# Patient Record
Sex: MALE | Race: WHITE | Employment: OTHER | URBAN - METROPOLITAN AREA
[De-identification: names, ages, dates, MRNs, and addresses within clinical notes are randomized per-mention and may not be internally consistent; named-entity substitution may affect disease eponyms.]

---

## 2018-05-11 ENCOUNTER — OFFICE VISIT (OUTPATIENT)
Dept: CARDIOLOGY CLINIC | Age: 83
End: 2018-05-11
Payer: MEDICARE

## 2018-05-11 VITALS
WEIGHT: 153.7 LBS | RESPIRATION RATE: 16 BRPM | HEIGHT: 71 IN | SYSTOLIC BLOOD PRESSURE: 120 MMHG | DIASTOLIC BLOOD PRESSURE: 82 MMHG | BODY MASS INDEX: 21.52 KG/M2 | HEART RATE: 73 BPM

## 2018-05-11 DIAGNOSIS — I65.29 CAROTID ATHEROSCLEROSIS, UNSPECIFIED LATERALITY: ICD-10-CM

## 2018-05-11 DIAGNOSIS — I10 ESSENTIAL HYPERTENSION: ICD-10-CM

## 2018-05-11 DIAGNOSIS — I73.00 RAYNAUD'S DISEASE WITHOUT GANGRENE: ICD-10-CM

## 2018-05-11 DIAGNOSIS — I51.9 HEART PROBLEM: Primary | ICD-10-CM

## 2018-05-11 DIAGNOSIS — I44.7 LBBB (LEFT BUNDLE BRANCH BLOCK): ICD-10-CM

## 2018-05-11 DIAGNOSIS — E07.9 THYROID DISEASE: ICD-10-CM

## 2018-05-11 PROCEDURE — 99214 OFFICE O/P EST MOD 30 MIN: CPT | Performed by: INTERNAL MEDICINE

## 2018-05-11 PROCEDURE — 93000 ELECTROCARDIOGRAM COMPLETE: CPT | Performed by: INTERNAL MEDICINE

## 2018-05-14 ENCOUNTER — HOSPITAL ENCOUNTER (OUTPATIENT)
Dept: CARDIOLOGY | Age: 83
Discharge: HOME | End: 2018-05-14
Payer: MEDICARE

## 2018-05-14 ENCOUNTER — TELEPHONE (OUTPATIENT)
Dept: CARDIOLOGY CLINIC | Age: 83
End: 2018-05-14

## 2018-05-14 VITALS
WEIGHT: 153 LBS | HEART RATE: 96 BPM | BODY MASS INDEX: 21.42 KG/M2 | SYSTOLIC BLOOD PRESSURE: 130 MMHG | HEIGHT: 71 IN | DIASTOLIC BLOOD PRESSURE: 70 MMHG

## 2018-05-14 DIAGNOSIS — R94.31 ABNORMAL EKG: Primary | ICD-10-CM

## 2018-05-14 DIAGNOSIS — I73.00 RAYNAUD'S DISEASE WITHOUT GANGRENE: ICD-10-CM

## 2018-05-14 DIAGNOSIS — I44.7 LBBB (LEFT BUNDLE BRANCH BLOCK): ICD-10-CM

## 2018-05-14 DIAGNOSIS — E07.9 THYROID DISEASE: ICD-10-CM

## 2018-05-14 DIAGNOSIS — I65.29 CAROTID ATHEROSCLEROSIS, UNSPECIFIED LATERALITY: ICD-10-CM

## 2018-05-14 DIAGNOSIS — I10 ESSENTIAL HYPERTENSION: ICD-10-CM

## 2018-05-14 DIAGNOSIS — I51.9 HEART PROBLEM: ICD-10-CM

## 2018-05-14 PROCEDURE — 93017 CV STRESS TEST TRACING ONLY: CPT

## 2018-05-14 PROCEDURE — 6360000002 HC RX W HCPCS: Performed by: INTERNAL MEDICINE

## 2018-05-14 PROCEDURE — 3430000000 HC RX DIAGNOSTIC RADIOPHARMACEUTICAL: Performed by: INTERNAL MEDICINE

## 2018-05-14 PROCEDURE — 2580000003 HC RX 258: Performed by: INTERNAL MEDICINE

## 2018-05-14 PROCEDURE — 78452 HT MUSCLE IMAGE SPECT MULT: CPT

## 2018-05-14 PROCEDURE — A9500 TC99M SESTAMIBI: HCPCS | Performed by: INTERNAL MEDICINE

## 2018-05-14 RX ORDER — SODIUM CHLORIDE 0.9 % (FLUSH) 0.9 %
10 SYRINGE (ML) INJECTION PRN
Status: DISCONTINUED | OUTPATIENT
Start: 2018-05-14 | End: 2018-05-15 | Stop reason: HOSPADM

## 2018-05-14 RX ADMIN — Medication 10 ML: at 08:52

## 2018-05-14 RX ADMIN — REGADENOSON 0.4 MG: 0.08 INJECTION, SOLUTION INTRAVENOUS at 08:52

## 2018-05-14 RX ADMIN — Medication 10 ML: at 07:08

## 2018-05-14 RX ADMIN — Medication 10 ML: at 08:53

## 2018-05-14 RX ADMIN — Medication 32 MILLICURIE: at 08:52

## 2018-05-14 RX ADMIN — Medication 10.6 MILLICURIE: at 07:08

## 2019-01-04 ENCOUNTER — PREP FOR PROCEDURE (OUTPATIENT)
Dept: GASTROENTEROLOGY | Age: 84
End: 2019-01-04

## 2019-01-16 ENCOUNTER — ANESTHESIA EVENT (OUTPATIENT)
Dept: ENDOSCOPY | Age: 84
End: 2019-01-16
Payer: MEDICARE

## 2019-01-16 ENCOUNTER — HOSPITAL ENCOUNTER (OUTPATIENT)
Age: 84
Setting detail: OUTPATIENT SURGERY
Discharge: HOME | End: 2019-01-16
Attending: INTERNAL MEDICINE | Admitting: INTERNAL MEDICINE
Payer: MEDICARE

## 2019-01-16 ENCOUNTER — ANESTHESIA (OUTPATIENT)
Dept: ENDOSCOPY | Age: 84
End: 2019-01-16
Payer: MEDICARE

## 2019-01-16 VITALS — OXYGEN SATURATION: 95 % | SYSTOLIC BLOOD PRESSURE: 148 MMHG | DIASTOLIC BLOOD PRESSURE: 114 MMHG

## 2019-01-16 VITALS
DIASTOLIC BLOOD PRESSURE: 59 MMHG | HEIGHT: 71 IN | WEIGHT: 148 LBS | OXYGEN SATURATION: 96 % | BODY MASS INDEX: 20.72 KG/M2 | SYSTOLIC BLOOD PRESSURE: 116 MMHG | RESPIRATION RATE: 18 BRPM | HEART RATE: 66 BPM | TEMPERATURE: 97.5 F

## 2019-01-16 PROCEDURE — 7100000010 HC PHASE II RECOVERY - FIRST 15 MIN: Performed by: INTERNAL MEDICINE

## 2019-01-16 PROCEDURE — 3700000000 HC ANESTHESIA ATTENDED CARE: Performed by: INTERNAL MEDICINE

## 2019-01-16 PROCEDURE — 2580000003 HC RX 258: Performed by: NURSE ANESTHETIST, CERTIFIED REGISTERED

## 2019-01-16 PROCEDURE — 3609012400 HC EGD TRANSORAL BIOPSY SINGLE/MULTIPLE: Performed by: INTERNAL MEDICINE

## 2019-01-16 PROCEDURE — 6360000002 HC RX W HCPCS: Performed by: NURSE ANESTHETIST, CERTIFIED REGISTERED

## 2019-01-16 PROCEDURE — 3700000001 HC ADD 15 MINUTES (ANESTHESIA): Performed by: INTERNAL MEDICINE

## 2019-01-16 PROCEDURE — 88305 TISSUE EXAM BY PATHOLOGIST: CPT

## 2019-01-16 PROCEDURE — 7100000011 HC PHASE II RECOVERY - ADDTL 15 MIN: Performed by: INTERNAL MEDICINE

## 2019-01-16 PROCEDURE — 2709999900 HC NON-CHARGEABLE SUPPLY: Performed by: INTERNAL MEDICINE

## 2019-01-16 PROCEDURE — 88342 IMHCHEM/IMCYTCHM 1ST ANTB: CPT

## 2019-01-16 RX ORDER — SODIUM CHLORIDE 0.9 % (FLUSH) 0.9 %
10 SYRINGE (ML) INJECTION EVERY 12 HOURS SCHEDULED
Status: CANCELLED | OUTPATIENT
Start: 2019-01-16

## 2019-01-16 RX ORDER — SODIUM CHLORIDE 0.9 % (FLUSH) 0.9 %
10 SYRINGE (ML) INJECTION PRN
Status: DISCONTINUED | OUTPATIENT
Start: 2019-01-16 | End: 2019-01-16 | Stop reason: HOSPADM

## 2019-01-16 RX ORDER — SODIUM CHLORIDE 9 MG/ML
INJECTION, SOLUTION INTRAVENOUS CONTINUOUS PRN
Status: DISCONTINUED | OUTPATIENT
Start: 2019-01-16 | End: 2019-01-16 | Stop reason: SDUPTHER

## 2019-01-16 RX ORDER — SODIUM CHLORIDE 0.9 % (FLUSH) 0.9 %
10 SYRINGE (ML) INJECTION EVERY 12 HOURS SCHEDULED
Status: DISCONTINUED | OUTPATIENT
Start: 2019-01-16 | End: 2019-01-16 | Stop reason: HOSPADM

## 2019-01-16 RX ORDER — SODIUM CHLORIDE 9 MG/ML
INJECTION, SOLUTION INTRAVENOUS CONTINUOUS
Status: CANCELLED | OUTPATIENT
Start: 2019-01-16

## 2019-01-16 RX ORDER — PROPOFOL 10 MG/ML
INJECTION, EMULSION INTRAVENOUS PRN
Status: DISCONTINUED | OUTPATIENT
Start: 2019-01-16 | End: 2019-01-16 | Stop reason: SDUPTHER

## 2019-01-16 RX ORDER — SODIUM CHLORIDE 9 MG/ML
INJECTION, SOLUTION INTRAVENOUS CONTINUOUS
Status: DISCONTINUED | OUTPATIENT
Start: 2019-01-16 | End: 2019-01-16 | Stop reason: HOSPADM

## 2019-01-16 RX ORDER — SODIUM CHLORIDE 0.9 % (FLUSH) 0.9 %
10 SYRINGE (ML) INJECTION PRN
Status: CANCELLED | OUTPATIENT
Start: 2019-01-16

## 2019-01-16 RX ADMIN — PROPOFOL 150 MG: 10 INJECTION, EMULSION INTRAVENOUS at 12:41

## 2019-01-16 RX ADMIN — SODIUM CHLORIDE: 9 INJECTION, SOLUTION INTRAVENOUS at 12:14

## 2019-01-16 ASSESSMENT — PAIN - FUNCTIONAL ASSESSMENT: PAIN_FUNCTIONAL_ASSESSMENT: 0-10

## 2019-01-16 ASSESSMENT — PAIN SCALES - GENERAL: PAINLEVEL_OUTOF10: 0

## 2019-05-09 ENCOUNTER — OFFICE VISIT (OUTPATIENT)
Dept: CARDIOLOGY CLINIC | Age: 84
End: 2019-05-09
Payer: MEDICARE

## 2019-05-09 VITALS
BODY MASS INDEX: 21.39 KG/M2 | WEIGHT: 152.8 LBS | DIASTOLIC BLOOD PRESSURE: 60 MMHG | SYSTOLIC BLOOD PRESSURE: 132 MMHG | HEART RATE: 60 BPM | RESPIRATION RATE: 16 BRPM | HEIGHT: 71 IN

## 2019-05-09 DIAGNOSIS — I10 HYPERTENSION, UNSPECIFIED TYPE: Primary | ICD-10-CM

## 2019-05-09 PROCEDURE — 93000 ELECTROCARDIOGRAM COMPLETE: CPT | Performed by: INTERNAL MEDICINE

## 2019-05-09 PROCEDURE — 99214 OFFICE O/P EST MOD 30 MIN: CPT | Performed by: INTERNAL MEDICINE

## 2019-05-09 RX ORDER — TRAZODONE HYDROCHLORIDE 150 MG/1
TABLET ORAL
Refills: 1 | Status: ON HOLD | COMMUNITY
Start: 2019-04-02 | End: 2021-04-07 | Stop reason: ALTCHOICE

## 2019-05-09 NOTE — PROGRESS NOTES
file     Minutes per session: Not on file    Stress: Not on file   Relationships    Social connections:     Talks on phone: Not on file     Gets together: Not on file     Attends Yarsanism service: Not on file     Active member of club or organization: Not on file     Attends meetings of clubs or organizations: Not on file     Relationship status: Not on file    Intimate partner violence:     Fear of current or ex partner: Not on file     Emotionally abused: Not on file     Physically abused: Not on file     Forced sexual activity: Not on file   Other Topics Concern    Not on file   Social History Narrative    Not on file       Family History   Problem Relation Age of Onset    Stroke Mother     Other Father     Heart Disease Brother        Review of Systems:  Heart: as above   Lungs: as above   Eyes: denies changes in vision or discharge. Ears: denies changes in hearing or pain. Nose: denies epistaxis or masses   Throat: denies sore throat or trouble swallowing. Neuro: denies numbness, tingling, tremors. Skin: denies rashes or itching. : denies hematuria, dysuria   GI: denies vomiting, diarrhea   Psych: denies mood changed, anxiety, depression. All other systems negative. Physical Exam   /60   Pulse 60   Resp 16   Ht 5' 11\" (1.803 m)   Wt 152 lb 12.8 oz (69.3 kg)   BMI 21.31 kg/m²   Constitutional: Oriented to person, place, and time. Well-developed and well-nourished. No distress. Head: Normocephalic and atraumatic. Eyes: EOM are normal. Pupils are equal, round, and reactive to light. Neck: Normal range of motion. Neck supple. No hepatojugular reflux and no JVD present. Carotid bruit is not present. No tracheal deviation present. No thyromegaly present. Cardiovascular: Normal rate, regular rhythm, normal heart sounds and intact distal pulses. Exam reveals no gallop and no friction rub. No murmur heard.   Pulmonary/Chest: Effort normal and breath sounds normal. No respiratory distress. No wheezes. No rales. No tenderness. Abdominal: Soft. Bowel sounds are normal. No distension and no mass. No tenderness. No rebound and no guarding. Musculoskeletal: Normal range of motion. No edema and no tenderness. Lymphadenopathy:   No cervical adenopathy. No groin adenopathy. Neurological: Alert and oriented to person, place, and time. Skin: Skin is warm and dry. No rash noted. Not diaphoretic. No erythema. Psychiatric: Normal mood and affect. Behavior is normal.     EKG:  normal sinus rhythm, nonspecific ST and T waves changes, LBBB. Exercise non-nuclear stress Test:  1/21/16, BP peaked at 152/90, 7.0 METS, no EKG changes    ASSESSMENT AND PLAN:  Patient Active Problem List   Diagnosis    Carotid atherosclerosis    Hypertension    Raynaud's disease    Thyroid disease     1. LBBB:     Pharm stress normal 5/14/18. 2. HTN: Observe. 3. PVD/ Carotid Disease: Continue ASA. Stopped statin at his request.     4. Raynaud's: Continue CCB. 5. Hypothyroidism: As per pcp    6. GERD: PPI    Tj Conway D.O.   Cardiologist  Cardiology, [facility]

## 2019-07-03 ENCOUNTER — HOSPITAL ENCOUNTER (OUTPATIENT)
Dept: ULTRASOUND IMAGING | Age: 84
Discharge: HOME | End: 2019-07-05
Payer: MEDICARE

## 2019-07-03 DIAGNOSIS — I65.21 OCCLUSION OF RIGHT CAROTID ARTERY: ICD-10-CM

## 2019-07-03 PROCEDURE — 93880 EXTRACRANIAL BILAT STUDY: CPT

## 2019-09-06 ENCOUNTER — HOSPITAL ENCOUNTER (OUTPATIENT)
Age: 84
Discharge: HOME | End: 2019-09-06
Payer: MEDICARE

## 2019-09-06 ENCOUNTER — HOSPITAL ENCOUNTER (OUTPATIENT)
Dept: CT IMAGING | Age: 84
Discharge: HOME | End: 2019-09-08
Payer: MEDICARE

## 2019-09-06 DIAGNOSIS — I65.21 OCCLUSION OF RIGHT CAROTID ARTERY: ICD-10-CM

## 2019-09-06 LAB
BUN BLDV-MCNC: 11 MG/DL (ref 8–23)
CREAT SERPL-MCNC: 0.7 MG/DL (ref 0.7–1.2)
GFR AFRICAN AMERICAN: >60
GFR NON-AFRICAN AMERICAN: >60 ML/MIN/1.73

## 2019-09-06 PROCEDURE — 84520 ASSAY OF UREA NITROGEN: CPT

## 2019-09-06 PROCEDURE — 6360000004 HC RX CONTRAST MEDICATION: Performed by: RADIOLOGY

## 2019-09-06 PROCEDURE — 70498 CT ANGIOGRAPHY NECK: CPT

## 2019-09-06 PROCEDURE — 36415 COLL VENOUS BLD VENIPUNCTURE: CPT

## 2019-09-06 PROCEDURE — 82565 ASSAY OF CREATININE: CPT

## 2019-09-06 PROCEDURE — 2580000003 HC RX 258: Performed by: RADIOLOGY

## 2019-09-06 RX ORDER — SODIUM CHLORIDE 0.9 % (FLUSH) 0.9 %
10 SYRINGE (ML) INJECTION PRN
Status: COMPLETED | OUTPATIENT
Start: 2019-09-06 | End: 2019-09-06

## 2019-09-06 RX ADMIN — IOPAMIDOL 80 ML: 755 INJECTION, SOLUTION INTRAVENOUS at 14:33

## 2019-09-06 RX ADMIN — Medication 10 ML: at 14:33

## 2019-09-12 ENCOUNTER — TELEPHONE (OUTPATIENT)
Dept: VASCULAR SURGERY | Age: 84
End: 2019-09-12

## 2021-01-25 ENCOUNTER — IMMUNIZATION (OUTPATIENT)
Dept: PRIMARY CARE CLINIC | Age: 86
End: 2021-01-25
Payer: MEDICARE

## 2021-01-25 DIAGNOSIS — Z23 NEED FOR VACCINATION: Primary | ICD-10-CM

## 2021-01-25 PROCEDURE — 91300 COVID-19, PFIZER VACCINE 30MCG/0.3ML DOSE: CPT | Performed by: PHYSICIAN ASSISTANT

## 2021-01-25 PROCEDURE — 0001A COVID-19, PFIZER VACCINE 30MCG/0.3ML DOSE: CPT | Performed by: PHYSICIAN ASSISTANT

## 2021-02-15 ENCOUNTER — IMMUNIZATION (OUTPATIENT)
Dept: PRIMARY CARE CLINIC | Age: 86
End: 2021-02-15
Payer: MEDICARE

## 2021-02-15 PROCEDURE — 0002A COVID-19, PFIZER VACCINE 30MCG/0.3ML DOSE: CPT | Performed by: NURSE PRACTITIONER

## 2021-02-15 PROCEDURE — 91300 COVID-19, PFIZER VACCINE 30MCG/0.3ML DOSE: CPT | Performed by: NURSE PRACTITIONER

## 2021-04-07 ENCOUNTER — APPOINTMENT (OUTPATIENT)
Dept: CT IMAGING | Age: 86
DRG: 689 | End: 2021-04-07
Payer: MEDICARE

## 2021-04-07 ENCOUNTER — HOSPITAL ENCOUNTER (INPATIENT)
Age: 86
LOS: 3 days | Discharge: HOME | DRG: 689 | End: 2021-04-10
Attending: EMERGENCY MEDICINE | Admitting: FAMILY MEDICINE
Payer: MEDICARE

## 2021-04-07 ENCOUNTER — APPOINTMENT (OUTPATIENT)
Dept: GENERAL RADIOLOGY | Age: 86
DRG: 689 | End: 2021-04-07
Payer: MEDICARE

## 2021-04-07 DIAGNOSIS — A41.9 SEPSIS WITH ACUTE RENAL FAILURE WITHOUT SEPTIC SHOCK, DUE TO UNSPECIFIED ORGANISM, UNSPECIFIED ACUTE RENAL FAILURE TYPE (HCC): Primary | ICD-10-CM

## 2021-04-07 DIAGNOSIS — J18.9 PNEUMONIA OF BOTH LUNGS DUE TO INFECTIOUS ORGANISM, UNSPECIFIED PART OF LUNG: ICD-10-CM

## 2021-04-07 DIAGNOSIS — N17.9 SEPSIS WITH ACUTE RENAL FAILURE WITHOUT SEPTIC SHOCK, DUE TO UNSPECIFIED ORGANISM, UNSPECIFIED ACUTE RENAL FAILURE TYPE (HCC): Primary | ICD-10-CM

## 2021-04-07 DIAGNOSIS — R65.20 SEPSIS WITH ACUTE RENAL FAILURE WITHOUT SEPTIC SHOCK, DUE TO UNSPECIFIED ORGANISM, UNSPECIFIED ACUTE RENAL FAILURE TYPE (HCC): Primary | ICD-10-CM

## 2021-04-07 DIAGNOSIS — N30.01 ACUTE CYSTITIS WITH HEMATURIA: ICD-10-CM

## 2021-04-07 DIAGNOSIS — I95.9 HYPOTENSION, UNSPECIFIED HYPOTENSION TYPE: ICD-10-CM

## 2021-04-07 PROBLEM — N39.0 UTI (URINARY TRACT INFECTION): Status: ACTIVE | Noted: 2021-04-07

## 2021-04-07 LAB
ALBUMIN SERPL-MCNC: 3.6 G/DL (ref 3.5–5.2)
ALP BLD-CCNC: 84 U/L (ref 40–129)
ALT SERPL-CCNC: 10 U/L (ref 0–40)
ANION GAP SERPL CALCULATED.3IONS-SCNC: 10 MMOL/L (ref 7–16)
AST SERPL-CCNC: 17 U/L (ref 0–39)
BACTERIA: ABNORMAL /HPF
BASOPHILS ABSOLUTE: 0 E9/L (ref 0–0.2)
BASOPHILS RELATIVE PERCENT: 0 % (ref 0–2)
BILIRUB SERPL-MCNC: 0.9 MG/DL (ref 0–1.2)
BILIRUBIN URINE: NEGATIVE
BLOOD, URINE: ABNORMAL
BUN BLDV-MCNC: 22 MG/DL (ref 8–23)
CALCIUM SERPL-MCNC: 9.3 MG/DL (ref 8.6–10.2)
CHLORIDE BLD-SCNC: 97 MMOL/L (ref 98–107)
CLARITY: ABNORMAL
CO2: 24 MMOL/L (ref 22–29)
COLOR: YELLOW
CREAT SERPL-MCNC: 1.1 MG/DL (ref 0.7–1.2)
EKG ATRIAL RATE: 80 BPM
EKG P AXIS: 68 DEGREES
EKG P-R INTERVAL: 180 MS
EKG Q-T INTERVAL: 420 MS
EKG QRS DURATION: 140 MS
EKG QTC CALCULATION (BAZETT): 484 MS
EKG R AXIS: 36 DEGREES
EKG T AXIS: 162 DEGREES
EKG VENTRICULAR RATE: 80 BPM
EOSINOPHILS ABSOLUTE: 0 E9/L (ref 0.05–0.5)
EOSINOPHILS RELATIVE PERCENT: 0 % (ref 0–6)
GFR AFRICAN AMERICAN: >60
GFR NON-AFRICAN AMERICAN: >60 ML/MIN/1.73
GLUCOSE BLD-MCNC: 109 MG/DL (ref 74–99)
GLUCOSE URINE: NEGATIVE MG/DL
HCT VFR BLD CALC: 36.9 % (ref 37–54)
HEMOGLOBIN: 12.5 G/DL (ref 12.5–16.5)
KETONES, URINE: ABNORMAL MG/DL
LACTIC ACID: 1.4 MMOL/L (ref 0.5–2.2)
LEUKOCYTE ESTERASE, URINE: ABNORMAL
LYMPHOCYTES ABSOLUTE: 0.65 E9/L (ref 1.5–4)
LYMPHOCYTES RELATIVE PERCENT: 6.1 % (ref 20–42)
MCH RBC QN AUTO: 31.3 PG (ref 26–35)
MCHC RBC AUTO-ENTMCNC: 33.9 % (ref 32–34.5)
MCV RBC AUTO: 92.5 FL (ref 80–99.9)
MONOCYTES ABSOLUTE: 0.32 E9/L (ref 0.1–0.95)
MONOCYTES RELATIVE PERCENT: 2.6 % (ref 2–12)
NEUTROPHILS ABSOLUTE: 9.83 E9/L (ref 1.8–7.3)
NEUTROPHILS RELATIVE PERCENT: 91.3 % (ref 43–80)
NITRITE, URINE: NEGATIVE
NUCLEATED RED BLOOD CELLS: 0 /100 WBC
PDW BLD-RTO: 13.3 FL (ref 11.5–15)
PH UA: 6 (ref 5–9)
PLATELET # BLD: 213 E9/L (ref 130–450)
PMV BLD AUTO: 9.2 FL (ref 7–12)
POTASSIUM REFLEX MAGNESIUM: 3.6 MMOL/L (ref 3.5–5)
PRO-BNP: 4008 PG/ML (ref 0–450)
PROTEIN UA: 30 MG/DL
RBC # BLD: 3.99 E12/L (ref 3.8–5.8)
RBC UA: ABNORMAL /HPF (ref 0–2)
SARS-COV-2, NAAT: NOT DETECTED
SODIUM BLD-SCNC: 131 MMOL/L (ref 132–146)
SPECIFIC GRAVITY UA: 1.01 (ref 1–1.03)
TOTAL CK: 52 U/L (ref 20–200)
TOTAL PROTEIN: 6.6 G/DL (ref 6.4–8.3)
TROPONIN: <0.01 NG/ML (ref 0–0.03)
UROBILINOGEN, URINE: 0.2 E.U./DL
WBC # BLD: 10.8 E9/L (ref 4.5–11.5)
WBC UA: >20 /HPF (ref 0–5)

## 2021-04-07 PROCEDURE — 96361 HYDRATE IV INFUSION ADD-ON: CPT

## 2021-04-07 PROCEDURE — 2580000003 HC RX 258: Performed by: STUDENT IN AN ORGANIZED HEALTH CARE EDUCATION/TRAINING PROGRAM

## 2021-04-07 PROCEDURE — 84484 ASSAY OF TROPONIN QUANT: CPT

## 2021-04-07 PROCEDURE — 2500000003 HC RX 250 WO HCPCS: Performed by: STUDENT IN AN ORGANIZED HEALTH CARE EDUCATION/TRAINING PROGRAM

## 2021-04-07 PROCEDURE — 83605 ASSAY OF LACTIC ACID: CPT

## 2021-04-07 PROCEDURE — 2580000003 HC RX 258: Performed by: FAMILY MEDICINE

## 2021-04-07 PROCEDURE — 87040 BLOOD CULTURE FOR BACTERIA: CPT

## 2021-04-07 PROCEDURE — 81001 URINALYSIS AUTO W/SCOPE: CPT

## 2021-04-07 PROCEDURE — 80053 COMPREHEN METABOLIC PANEL: CPT

## 2021-04-07 PROCEDURE — 93005 ELECTROCARDIOGRAM TRACING: CPT | Performed by: EMERGENCY MEDICINE

## 2021-04-07 PROCEDURE — 99284 EMERGENCY DEPT VISIT MOD MDM: CPT

## 2021-04-07 PROCEDURE — 87077 CULTURE AEROBIC IDENTIFY: CPT

## 2021-04-07 PROCEDURE — 87635 SARS-COV-2 COVID-19 AMP PRB: CPT

## 2021-04-07 PROCEDURE — 87088 URINE BACTERIA CULTURE: CPT

## 2021-04-07 PROCEDURE — 87186 SC STD MICRODIL/AGAR DIL: CPT

## 2021-04-07 PROCEDURE — 93010 ELECTROCARDIOGRAM REPORT: CPT | Performed by: INTERNAL MEDICINE

## 2021-04-07 PROCEDURE — 36415 COLL VENOUS BLD VENIPUNCTURE: CPT

## 2021-04-07 PROCEDURE — 82550 ASSAY OF CK (CPK): CPT

## 2021-04-07 PROCEDURE — 6360000002 HC RX W HCPCS: Performed by: STUDENT IN AN ORGANIZED HEALTH CARE EDUCATION/TRAINING PROGRAM

## 2021-04-07 PROCEDURE — 83880 ASSAY OF NATRIURETIC PEPTIDE: CPT

## 2021-04-07 PROCEDURE — 71045 X-RAY EXAM CHEST 1 VIEW: CPT

## 2021-04-07 PROCEDURE — 85025 COMPLETE CBC W/AUTO DIFF WBC: CPT

## 2021-04-07 PROCEDURE — 70450 CT HEAD/BRAIN W/O DYE: CPT

## 2021-04-07 PROCEDURE — 96365 THER/PROPH/DIAG IV INF INIT: CPT

## 2021-04-07 PROCEDURE — 1200000000 HC SEMI PRIVATE

## 2021-04-07 RX ORDER — ACETAMINOPHEN 650 MG/1
650 SUPPOSITORY RECTAL EVERY 6 HOURS PRN
Status: DISCONTINUED | OUTPATIENT
Start: 2021-04-07 | End: 2021-04-10 | Stop reason: HOSPADM

## 2021-04-07 RX ORDER — LANOLIN ALCOHOL/MO/W.PET/CERES
3 CREAM (GRAM) TOPICAL DAILY
COMMUNITY

## 2021-04-07 RX ORDER — SODIUM CHLORIDE 0.9 % (FLUSH) 0.9 %
5-40 SYRINGE (ML) INJECTION EVERY 12 HOURS SCHEDULED
Status: DISCONTINUED | OUTPATIENT
Start: 2021-04-08 | End: 2021-04-10 | Stop reason: HOSPADM

## 2021-04-07 RX ORDER — FLUTICASONE PROPIONATE 50 MCG
1 SPRAY, SUSPENSION (ML) NASAL DAILY
COMMUNITY
End: 2021-10-31

## 2021-04-07 RX ORDER — POLYETHYLENE GLYCOL 3350 17 G/17G
17 POWDER, FOR SOLUTION ORAL DAILY PRN
Status: DISCONTINUED | OUTPATIENT
Start: 2021-04-07 | End: 2021-04-10 | Stop reason: HOSPADM

## 2021-04-07 RX ORDER — 0.9 % SODIUM CHLORIDE 0.9 %
1000 INTRAVENOUS SOLUTION INTRAVENOUS ONCE
Status: COMPLETED | OUTPATIENT
Start: 2021-04-07 | End: 2021-04-07

## 2021-04-07 RX ORDER — ONDANSETRON 2 MG/ML
4 INJECTION INTRAMUSCULAR; INTRAVENOUS EVERY 6 HOURS PRN
Status: DISCONTINUED | OUTPATIENT
Start: 2021-04-07 | End: 2021-04-10 | Stop reason: HOSPADM

## 2021-04-07 RX ORDER — PROMETHAZINE HYDROCHLORIDE 25 MG/1
12.5 TABLET ORAL EVERY 6 HOURS PRN
Status: DISCONTINUED | OUTPATIENT
Start: 2021-04-07 | End: 2021-04-10 | Stop reason: HOSPADM

## 2021-04-07 RX ORDER — PRAMIPEXOLE DIHYDROCHLORIDE 0.5 MG/1
0.5 TABLET ORAL
COMMUNITY

## 2021-04-07 RX ORDER — PANTOPRAZOLE SODIUM 20 MG/1
20 TABLET, DELAYED RELEASE ORAL EVERY OTHER DAY
Status: DISCONTINUED | OUTPATIENT
Start: 2021-04-08 | End: 2021-04-10 | Stop reason: HOSPADM

## 2021-04-07 RX ORDER — SODIUM CHLORIDE 0.9 % (FLUSH) 0.9 %
5-40 SYRINGE (ML) INJECTION EVERY 12 HOURS SCHEDULED
Status: DISCONTINUED | OUTPATIENT
Start: 2021-04-07 | End: 2021-04-08 | Stop reason: SDUPTHER

## 2021-04-07 RX ORDER — SODIUM CHLORIDE 9 MG/ML
25 INJECTION, SOLUTION INTRAVENOUS PRN
Status: DISCONTINUED | OUTPATIENT
Start: 2021-04-07 | End: 2021-04-10 | Stop reason: HOSPADM

## 2021-04-07 RX ORDER — SODIUM CHLORIDE 0.9 % (FLUSH) 0.9 %
5-40 SYRINGE (ML) INJECTION PRN
Status: DISCONTINUED | OUTPATIENT
Start: 2021-04-07 | End: 2021-04-08 | Stop reason: SDUPTHER

## 2021-04-07 RX ORDER — ACETAMINOPHEN 325 MG/1
650 TABLET ORAL EVERY 6 HOURS PRN
Status: DISCONTINUED | OUTPATIENT
Start: 2021-04-07 | End: 2021-04-10 | Stop reason: HOSPADM

## 2021-04-07 RX ORDER — DOXYCYCLINE HYCLATE 100 MG/1
100 CAPSULE ORAL EVERY 12 HOURS SCHEDULED
Status: DISCONTINUED | OUTPATIENT
Start: 2021-04-08 | End: 2021-04-10 | Stop reason: HOSPADM

## 2021-04-07 RX ORDER — TRAZODONE HYDROCHLORIDE 150 MG/1
150 TABLET ORAL NIGHTLY
Status: DISCONTINUED | OUTPATIENT
Start: 2021-04-08 | End: 2021-04-10 | Stop reason: HOSPADM

## 2021-04-07 RX ORDER — SODIUM CHLORIDE 9 MG/ML
25 INJECTION, SOLUTION INTRAVENOUS PRN
Status: DISCONTINUED | OUTPATIENT
Start: 2021-04-07 | End: 2021-04-08 | Stop reason: SDUPTHER

## 2021-04-07 RX ORDER — CETIRIZINE HYDROCHLORIDE 5 MG/1
5 TABLET ORAL DAILY
COMMUNITY

## 2021-04-07 RX ORDER — LEVOTHYROXINE SODIUM 0.1 MG/1
100 TABLET ORAL DAILY
Status: DISCONTINUED | OUTPATIENT
Start: 2021-04-08 | End: 2021-04-10 | Stop reason: HOSPADM

## 2021-04-07 RX ORDER — SODIUM CHLORIDE 0.9 % (FLUSH) 0.9 %
5-40 SYRINGE (ML) INJECTION PRN
Status: DISCONTINUED | OUTPATIENT
Start: 2021-04-07 | End: 2021-04-10 | Stop reason: HOSPADM

## 2021-04-07 RX ORDER — ASPIRIN 81 MG/1
81 TABLET ORAL DAILY
COMMUNITY

## 2021-04-07 RX ORDER — SODIUM CHLORIDE 9 MG/ML
INJECTION, SOLUTION INTRAVENOUS CONTINUOUS
Status: DISCONTINUED | OUTPATIENT
Start: 2021-04-08 | End: 2021-04-09

## 2021-04-07 RX ADMIN — DOXYCYCLINE 100 MG: 100 INJECTION, POWDER, LYOPHILIZED, FOR SOLUTION INTRAVENOUS at 20:27

## 2021-04-07 RX ADMIN — CEFTRIAXONE 1000 MG: 1 INJECTION, POWDER, FOR SOLUTION INTRAMUSCULAR; INTRAVENOUS at 19:38

## 2021-04-07 RX ADMIN — SODIUM CHLORIDE 1000 ML: 9 INJECTION, SOLUTION INTRAVENOUS at 15:30

## 2021-04-07 RX ADMIN — SODIUM CHLORIDE: 9 INJECTION, SOLUTION INTRAVENOUS at 23:55

## 2021-04-07 RX ADMIN — SODIUM CHLORIDE 1000 ML: 9 INJECTION, SOLUTION INTRAVENOUS at 17:18

## 2021-04-07 ASSESSMENT — PAIN SCALES - GENERAL: PAINLEVEL_OUTOF10: 0

## 2021-04-07 NOTE — ED NOTES
Bed: 13  Expected date:   Expected time:   Means of arrival:   Comments:  EMS     Allegra Walls RN  04/07/21 4209

## 2021-04-07 NOTE — ED PROVIDER NOTES
Patient is an 42-year-old male with past medical history of hypertension, Raynaud's, hard of hearing who presents to the emergency department with a complaint of unsteady gait and \"not acting himself\". Patient is accompanied by daughter. Daughter and patient reporting that last night patient ate dinner and had multiple bouts of emesis. Patient also states that he \"worked out\" more yesterday than normal saying that throughout the day he worked out for 4 hours doing cardio and weightlifting. Patient states he normally does 2 hours a day. Patient states he believes he \"overdid it\". Patient slept well through the night but woke up with a shaking episode to the upper extremities that was described as tremors that lasted approximately 30 minutes and then resolved. Daughter states that through the day patient has just been appearing pale and not acting himself, when he walks he is unsteady gait and has to lean up against a wall to balance himself patient without any falls, loss of conscious, did not strike his head. Patient is not on anticoagulation. She has suffered no other trauma. She denies any chest pain, shortness of breath, headache, nausea, vomiting today, diarrhea. Patient denies any blurred or double vision. Patient denies any dizziness. Patient denies any fever or chills. Patient does endorse body aches and malaise. Patient denies any recent illness or sick contacts. Review of Systems   Constitutional: Negative for appetite change, chills, fatigue and fever. HENT: Negative for congestion, rhinorrhea, trouble swallowing and voice change. Eyes: Negative for photophobia and visual disturbance. Respiratory: Negative for cough and shortness of breath. Cardiovascular: Negative for chest pain, palpitations and leg swelling. Gastrointestinal: Positive for nausea and vomiting. Negative for abdominal pain, constipation and diarrhea. Endocrine: Negative for polyuria.    Genitourinary: Negative for dysuria, flank pain, frequency, hematuria and urgency. Musculoskeletal: Positive for gait problem. Negative for arthralgias and myalgias. Skin: Positive for pallor. Negative for rash and wound. Allergic/Immunologic: Negative for immunocompromised state. Neurological: Positive for weakness. Negative for dizziness, syncope, light-headedness and headaches. Psychiatric/Behavioral: Negative for confusion. The patient is not nervous/anxious. Physical Exam  Vitals signs and nursing note reviewed. Constitutional:       General: He is not in acute distress. Appearance: He is ill-appearing. He is not toxic-appearing. HENT:      Head: Normocephalic and atraumatic. Nose: Nose normal.      Mouth/Throat:      Mouth: Mucous membranes are moist.      Pharynx: Oropharynx is clear. Eyes:      Extraocular Movements: Extraocular movements intact. Conjunctiva/sclera: Conjunctivae normal.      Pupils: Pupils are equal, round, and reactive to light. Neck:      Musculoskeletal: Normal range of motion and neck supple. Cardiovascular:      Rate and Rhythm: Normal rate and regular rhythm. Pulses: Normal pulses. Heart sounds: Normal heart sounds. Pulmonary:      Effort: Pulmonary effort is normal.      Breath sounds: Normal breath sounds. Abdominal:      General: Abdomen is flat. Bowel sounds are normal. There is no distension. Palpations: Abdomen is soft. Tenderness: There is no abdominal tenderness. There is no guarding or rebound. Musculoskeletal: Normal range of motion. Skin:     General: Skin is warm and dry. Capillary Refill: Capillary refill takes less than 2 seconds. Neurological:      General: No focal deficit present. Mental Status: He is alert and oriented to person, place, and time.         MDM  Number of Diagnoses or Management Options  Acute cystitis with hematuria  Hypotension, unspecified hypotension type  Pneumonia of both lungs due to infectious organism, unspecified part of lung  Sepsis with acute renal failure without septic shock, due to unspecified organism, unspecified acute renal failure type [facility])  Diagnosis management comments: Patient is an 49-year-old male presents to the emergency department from home with daughter with complaint of unsteady gait, shaking episode, emesis. Daughter states that the unsteady gait has been going on all day, does not endorse any confusion, no recent trauma or striking his head. Patient states that he had emesis last night as well as shaking episode this morning that both resolved. Patient presents awake, alert, neurologically intact, ill-appearing. Patient is noted hypotensive, afebrile. Patient treated with IV fluids and work-up significant for no leukocytosis and troponin negative, lactic acid and CK negative. proBNP nominal.  Urinalysis consistent with urinary tract infection, urine culture is pending. COVID-19 negative. CT scan of the head is negative for intracranial process, chest x-ray does reveal multilobar pneumonia. Patient monitoring given IV fluid however remained hypotensive and second liter was required with patient becoming normotensive. EKG interpreted showing sinus mechanism with bundle branch block that is not new. Patient was covered with antibiotics for UTI as well as pneumonia. Patient with likely sepsis causing hypotension, and unsteady gait. Patient did respond well to fluids. Decision was made to admit the patient and PCP was consulted, agreed to admit the patient.   Patient was monitored in the emergency department without further change and was admitted in stable condition       Amount and/or Complexity of Data Reviewed  Clinical lab tests: ordered and reviewed  Tests in the radiology section of CPT®: ordered and reviewed      --------------------------------------------- PAST HISTORY ---------------------------------------------  Past Medical History:  has a past medical history of Lea's esophagus, Carotid atherosclerosis, History of stress test, [place] (hard of hearing), Hypertension, Raynaud's disease, and Thyroid disease. Past Surgical History:  has a past surgical history that includes Hemorrhoid surgery; eye surgery (Bilateral); Endoscopy, colon, diagnostic; Colonoscopy; Tonsillectomy; Finger amputation; hernia repair (05/20/2018); and Upper gastrointestinal endoscopy (N/A, 1/16/2019). Social History:  reports that he has never smoked. He has never used smokeless tobacco. He reports current alcohol use of about 1.0 standard drinks of alcohol per week. He reports that he does not use drugs. Family History: family history includes Heart Disease in his brother; Other in his father; Stroke in his mother. The patients home medications have been reviewed. Allergies: Patient has no known allergies.     -------------------------------------------------- RESULTS -------------------------------------------------    LABS:  Results for orders placed or performed during the hospital encounter of 04/07/21   COVID-19, Rapid    Specimen: Nasopharyngeal Swab   Result Value Ref Range    SARS-CoV-2, NAAT Not Detected Not Detected   CBC Auto Differential   Result Value Ref Range    WBC 10.8 4.5 - 11.5 E9/L    RBC 3.99 3.80 - 5.80 E12/L    Hemoglobin 12.5 12.5 - 16.5 g/dL    Hematocrit 36.9 (L) 37.0 - 54.0 %    MCV 92.5 80.0 - 99.9 fL    MCH 31.3 26.0 - 35.0 pg    MCHC 33.9 32.0 - 34.5 %    RDW 13.3 11.5 - 15.0 fL    Platelets 627 452 - 874 E9/L    MPV 9.2 7.0 - 12.0 fL    Neutrophils % 91.3 (H) 43.0 - 80.0 %    Lymphocytes % 6.1 (L) 20.0 - 42.0 %    Monocytes % 2.6 2.0 - 12.0 %    Eosinophils % 0.0 0.0 - 6.0 %    Basophils % 0.0 0.0 - 2.0 %    Neutrophils Absolute 9.83 (H) 1.80 - 7.30 E9/L    Lymphocytes Absolute 0.65 (L) 1.50 - 4.00 E9/L    Monocytes Absolute 0.32 0.10 - 0.95 E9/L    Eosinophils Absolute 0.00 (L) 0.05 - 0.50 E9/L    Basophils Absolute 0.00 0.00 - 0.20 E9/L    nRBC 0.0 /100 WBC   Comprehensive Metabolic Panel w/ Reflex to MG   Result Value Ref Range    Sodium 131 (L) 132 - 146 mmol/L    Potassium reflex Magnesium 3.6 3.5 - 5.0 mmol/L    Chloride 97 (L) 98 - 107 mmol/L    CO2 24 22 - 29 mmol/L    Anion Gap 10 7 - 16 mmol/L    Glucose 109 (H) 74 - 99 mg/dL    BUN 22 8 - 23 mg/dL    CREATININE 1.1 0.7 - 1.2 mg/dL    GFR Non-African American >60 >=60 mL/min/1.73    GFR African American >60     Calcium 9.3 8.6 - 10.2 mg/dL    Total Protein 6.6 6.4 - 8.3 g/dL    Albumin 3.6 3.5 - 5.2 g/dL    Total Bilirubin 0.9 0.0 - 1.2 mg/dL    Alkaline Phosphatase 84 40 - 129 U/L    ALT 10 0 - 40 U/L    AST 17 0 - 39 U/L   Troponin   Result Value Ref Range    Troponin <0.01 0.00 - 0.03 ng/mL   Brain Natriuretic Peptide   Result Value Ref Range    Pro-BNP 4,008 (H) 0 - 450 pg/mL   Urinalysis, reflex to microscopic   Result Value Ref Range    Color, UA Yellow Straw/Yellow    Clarity, UA CLOUDY (A) Clear    Glucose, Ur Negative Negative mg/dL    Bilirubin Urine Negative Negative    Ketones, Urine TRACE (A) Negative mg/dL    Specific Gravity, UA 1.015 1.005 - 1.030    Blood, Urine SMALL (A) Negative    pH, UA 6.0 5.0 - 9.0    Protein, UA 30 (A) Negative mg/dL    Urobilinogen, Urine 0.2 <2.0 E.U./dL    Nitrite, Urine Negative Negative    Leukocyte Esterase, Urine LARGE (A) Negative   Lactic Acid, Plasma   Result Value Ref Range    Lactic Acid 1.4 0.5 - 2.2 mmol/L   CK   Result Value Ref Range    Total CK 52 20 - 200 U/L   Microscopic Urinalysis   Result Value Ref Range    WBC, UA >20 (A) 0 - 5 /HPF    RBC, UA 5-10 (A) 0 - 2 /HPF    Bacteria, UA RARE (A) None Seen /HPF   EKG 12 Lead   Result Value Ref Range    Ventricular Rate 80 BPM    Atrial Rate 80 BPM    P-R Interval 180 ms    QRS Duration 140 ms    Q-T Interval 420 ms    QTc Calculation (Bazett) 484 ms    P Axis 68 degrees    R Axis 36 degrees    T Axis 162 degrees     RADIOLOGY:  CT HEAD WO CONTRAST   Final Result   No acute admission.    --------------------------------- ADDITIONAL PROVIDER NOTES ---------------------------------  Consultations:  Spoke with Dr. Neli Drake. Discussed case. They will admit the patient. This patient's ED course included: a personal history and physicial examination, multiple bedside re-evaluations, IV medications, cardiac monitoring and continuous pulse oximetry    This patient has remained hemodynamically stable during their ED course. Diagnosis:  1. Sepsis with acute renal failure without septic shock, due to unspecified organism, unspecified acute renal failure type ([address]    2. Acute cystitis with hematuria    3. Pneumonia of both lungs due to infectious organism, unspecified part of lung    4. Hypotension, unspecified hypotension type      Disposition:  Patient's disposition: Admit to telemetry  Patient's condition is stable. 4/8/21, 1:22 AM EDT.     This note is prepared by Ravin Bridges MD -PGY-1             Ravin Bridges MD  Resident  04/08/21 1646

## 2021-04-08 LAB
ALBUMIN SERPL-MCNC: 3 G/DL (ref 3.5–5.2)
ALP BLD-CCNC: 67 U/L (ref 40–129)
ALT SERPL-CCNC: 9 U/L (ref 0–40)
ANION GAP SERPL CALCULATED.3IONS-SCNC: 7 MMOL/L (ref 7–16)
AST SERPL-CCNC: 19 U/L (ref 0–39)
BASOPHILS ABSOLUTE: 0.02 E9/L (ref 0–0.2)
BASOPHILS RELATIVE PERCENT: 0.2 % (ref 0–2)
BILIRUB SERPL-MCNC: 0.7 MG/DL (ref 0–1.2)
BUN BLDV-MCNC: 22 MG/DL (ref 8–23)
CALCIUM SERPL-MCNC: 8.4 MG/DL (ref 8.6–10.2)
CHLORIDE BLD-SCNC: 102 MMOL/L (ref 98–107)
CO2: 24 MMOL/L (ref 22–29)
CREAT SERPL-MCNC: 0.8 MG/DL (ref 0.7–1.2)
EOSINOPHILS ABSOLUTE: 0.04 E9/L (ref 0.05–0.5)
EOSINOPHILS RELATIVE PERCENT: 0.4 % (ref 0–6)
GFR AFRICAN AMERICAN: >60
GFR NON-AFRICAN AMERICAN: >60 ML/MIN/1.73
GLUCOSE BLD-MCNC: 109 MG/DL (ref 74–99)
HCT VFR BLD CALC: 31.5 % (ref 37–54)
HEMOGLOBIN: 10.5 G/DL (ref 12.5–16.5)
IMMATURE GRANULOCYTES #: 0.05 E9/L
IMMATURE GRANULOCYTES %: 0.5 % (ref 0–5)
LYMPHOCYTES ABSOLUTE: 1.63 E9/L (ref 1.5–4)
LYMPHOCYTES RELATIVE PERCENT: 17.3 % (ref 20–42)
MCH RBC QN AUTO: 30.9 PG (ref 26–35)
MCHC RBC AUTO-ENTMCNC: 33.3 % (ref 32–34.5)
MCV RBC AUTO: 92.6 FL (ref 80–99.9)
MONOCYTES ABSOLUTE: 0.63 E9/L (ref 0.1–0.95)
MONOCYTES RELATIVE PERCENT: 6.7 % (ref 2–12)
NEUTROPHILS ABSOLUTE: 7.03 E9/L (ref 1.8–7.3)
NEUTROPHILS RELATIVE PERCENT: 74.9 % (ref 43–80)
PDW BLD-RTO: 13.5 FL (ref 11.5–15)
PLATELET # BLD: 170 E9/L (ref 130–450)
PMV BLD AUTO: 9.1 FL (ref 7–12)
POTASSIUM REFLEX MAGNESIUM: 3.7 MMOL/L (ref 3.5–5)
RBC # BLD: 3.4 E12/L (ref 3.8–5.8)
SODIUM BLD-SCNC: 133 MMOL/L (ref 132–146)
TOTAL PROTEIN: 5.6 G/DL (ref 6.4–8.3)
WBC # BLD: 9.4 E9/L (ref 4.5–11.5)

## 2021-04-08 PROCEDURE — 2580000003 HC RX 258: Performed by: FAMILY MEDICINE

## 2021-04-08 PROCEDURE — 85025 COMPLETE CBC W/AUTO DIFF WBC: CPT

## 2021-04-08 PROCEDURE — 97535 SELF CARE MNGMENT TRAINING: CPT

## 2021-04-08 PROCEDURE — 1200000000 HC SEMI PRIVATE

## 2021-04-08 PROCEDURE — 36415 COLL VENOUS BLD VENIPUNCTURE: CPT

## 2021-04-08 PROCEDURE — 80053 COMPREHEN METABOLIC PANEL: CPT

## 2021-04-08 PROCEDURE — 6370000000 HC RX 637 (ALT 250 FOR IP): Performed by: FAMILY MEDICINE

## 2021-04-08 PROCEDURE — 97161 PT EVAL LOW COMPLEX 20 MIN: CPT

## 2021-04-08 PROCEDURE — 6360000002 HC RX W HCPCS: Performed by: FAMILY MEDICINE

## 2021-04-08 PROCEDURE — 97165 OT EVAL LOW COMPLEX 30 MIN: CPT

## 2021-04-08 RX ORDER — PRAMIPEXOLE DIHYDROCHLORIDE 0.25 MG/1
0.5 TABLET ORAL NIGHTLY
Status: DISCONTINUED | OUTPATIENT
Start: 2021-04-08 | End: 2021-04-10 | Stop reason: HOSPADM

## 2021-04-08 RX ADMIN — PRAMIPEXOLE DIHYDROCHLORIDE 0.5 MG: 0.25 TABLET ORAL at 18:42

## 2021-04-08 RX ADMIN — ENOXAPARIN SODIUM 40 MG: 40 INJECTION SUBCUTANEOUS at 08:26

## 2021-04-08 RX ADMIN — DOXYCYCLINE HYCLATE 100 MG: 100 CAPSULE ORAL at 21:35

## 2021-04-08 RX ADMIN — CEFTRIAXONE 1000 MG: 1 INJECTION, POWDER, FOR SOLUTION INTRAMUSCULAR; INTRAVENOUS at 18:43

## 2021-04-08 RX ADMIN — SODIUM CHLORIDE: 9 INJECTION, SOLUTION INTRAVENOUS at 21:35

## 2021-04-08 RX ADMIN — TRAZODONE HYDROCHLORIDE 150 MG: 150 TABLET ORAL at 21:35

## 2021-04-08 RX ADMIN — DOXYCYCLINE HYCLATE 100 MG: 100 CAPSULE ORAL at 08:26

## 2021-04-08 RX ADMIN — POLYETHYLENE GLYCOL 3350 17 G: 17 POWDER, FOR SOLUTION ORAL at 14:32

## 2021-04-08 RX ADMIN — SODIUM CHLORIDE, PRESERVATIVE FREE 10 ML: 5 INJECTION INTRAVENOUS at 21:36

## 2021-04-08 RX ADMIN — LEVOTHYROXINE SODIUM 100 MCG: 100 TABLET ORAL at 06:16

## 2021-04-08 RX ADMIN — PANTOPRAZOLE SODIUM 20 MG: 20 TABLET, DELAYED RELEASE ORAL at 08:26

## 2021-04-08 ASSESSMENT — ENCOUNTER SYMPTOMS
CONSTIPATION: 0
SHORTNESS OF BREATH: 0
NAUSEA: 1
DIARRHEA: 0
PHOTOPHOBIA: 0
VOMITING: 1
ABDOMINAL PAIN: 0
COUGH: 0
RHINORRHEA: 0
TROUBLE SWALLOWING: 0
VOICE CHANGE: 0

## 2021-04-08 ASSESSMENT — PAIN SCALES - GENERAL
PAINLEVEL_OUTOF10: 0
PAINLEVEL_OUTOF10: 0

## 2021-04-08 NOTE — CARE COORDINATION
Met with pt , daughter [address] at bedside; pt A&O x3 ; pt resides alone  Independently , daughter [employer] assists as needed. No hx TINO/HHC. Aide states pt sees  @ [facility] for hx lipoid pneumonia (d/t aspiration? )over the past year. . Also follows  as opt for Lea's dz. Scheduled for opt EGD may 5th. occasionally coughs with eating. Denies any needs at discharge; pt plans to return home. currently on room air. , on iv atb's. Pt also mentioned that he has had both covid vaccinations(phizer). will follow. Darwin Simmons.

## 2021-04-08 NOTE — PROGRESS NOTES
Physical Therapy    Facility/Department: [address] MED SURG/TELE  Initial Assessment    NAME: Maryam Chavez  : 1931  MRN: [id number]    Date of Service: 2021      Attending Provider:  Ijeoma Corrales MD    Evaluating PT:  Albino Noss. Sheryl Mohs., P.T. Room #:  6680/1136-X  Diagnosis:  Sepsis, unsteady gait, AMS, UTI  Precautions:  Falls, bed/chair alarm  Equipment Needs:  Wheeled walker    SUBJECTIVE:    Pt lives alone in a condo with no stairs to enter. Pt ambulated with 2 walking sticks due to impaired balance over the last 4 years PTA. OBJECTIVE:   Initial Evaluation  Date: 21 Treatment Short Term/ Long Term   Goals   Was pt agreeable to Eval/treatment? yes     Does pt have pain? No c/o pain     Bed Mobility  Rolling: Independent  Supine to sit: Independent  Sit to supine: Independent  Scooting: Independent  Independent   Transfers Sit to stand: supervision  Stand to sit: supervision  Stand pivot: SBA  Independent    Ambulation   400 feet with ww SBA/supervision  500 feet with ww Independent    Stair negotiation: ascended and descended NA  NA   AM-PAC 6 Clicks 75/13       BLE ROM is WFL. BLE strength is grossly 4/5 to 4+/5. Sensation:  Pt denies numbness and tingling to extremities  Edema:  None noted  Balance: sitting is Independent and standing with ww is supervision  Endurance: fair+    ASSESSMENT:    Comments:  Pt walked with steady gait using ww and had no LOB. He may benefit from a ww at home to help decrease his risk of falling until his strength and endurance returns to baseline. Pt was left supine in bed with HOB elevated to comfort and with call light left by patient. Chair/bed alarm: bed alarm     Pt's/ family goals   1. To go home. Patient and or family understand(s) diagnosis, prognosis, and plan of care.     PLAN OF CARE:    Current Treatment Recommendations      [x] Strengthening     [] ROM   [x] Balance Training   [x] Endurance Training   [x] Transfer Training [x] Gait Training   [] Stair Training   [] Positioning   [] Safety and Education Training   [x] Patient/Caregiver Education   [] HEP  [] Other     PT care will be provided in accordance with the objectives noted above. Exercises and functional mobility practice will be used as well as appropriate assistive devices or modalities to obtain goals. Patient and family education will also be administered as needed. Frequency of treatments: 2-5x/week x 1-2 weeks. Time in  09:15  Time out  09:35    Total Treatment Time  20 minutes     Evaluation Time includes thorough review of current medical information, gathering information on past medical history/social history and prior level of function, completion of standardized testing/informal observation of tasks, assessment of data and education on plan of care and goals. CPT codes:  [x] Low Complexity PT evaluation 41851  [] Moderate Complexity PT evaluation 37323  [] High Complexity PT evaluation 67521  [] PT Re-evaluation 39704  [] Gait training 89476 ** minutes  [] Manual therapy 48707 ** minutes  [] Therapeutic activities 80561 ** minutes  [] Therapeutic exercises 43772 ** minutes  [] Neuromuscular reeducation 32974 ** minutes     Flo Nixs., P.T.   License Number: [id number]

## 2021-04-08 NOTE — CARE COORDINATION
Social work / Discharge Planning:       Social work attempted to see patient but he was not available for conversation at the time. Chart reviewed. Per therapy evaluation, the patient lives alone. Current AM PAC 20/24. Patient is on room air. No discharge needs identified at this time. Social work will follow to assist with discharge planning.   Electronically signed by DARLIN Garay on 4/8/2021 at 1:08 PM

## 2021-04-08 NOTE — PROGRESS NOTES
Occupational Therapy  OCCUPATIONAL THERAPY INITIAL EVALUATION      Date:2021  Patient Name: Mahi Chowdhury  MRN: [id number]  : 1931  Room: [address] [place] [address]    Referring Provider: Nehemiah Galeana MD  Evaluating OT: Tequila [id number]    AM-PAC Daily Activity Raw Score: 19/24    Recommended Adaptive Equipment: Dorma Mates Tray    Diagnosis: Sepsis ([address] [A41.9]     Pertinent Medical History: HTN, Raynaud's disease      Precautions: falls, bed alarm, hearing impairment    Home Living: Patient lives alone in a one-floor condo (one step to enter from garage). Bathroom Setup: walk-in shower (with built-in seat) and standard-height toilet  Equipment Owned: shower chair (available), emergency alert button    Prior Level of Function (PLOF): Per patient and patient's daughter, patient was independent with ADLs, IADLs, and functional mobility (without device) prior to this hospitalization. Driving: Yes    Pain Level: Patient reported experiencing abdominal discomfort secondary to being unable to move his bowels; nursing aware of patient's complaint. Cognition: Patient alert and oriented grossly; confusion demonstrated occasionally. WFL command follow demonstrated. Patient pleasant and cooperative. Memory: Fair   Sequencing: Fair   Problem Solving: Fair   Judgement/Safety: Fair    Impulsivity demonstrated.     Functional Assessment:   Initial Eval Status  Date: 2021 Treatment Status  Date:  Short Term Goals   Feeding Independent     Grooming SBA  Mod I / Independent  (seated/standing at sink)   UB Dressing SBA  Independent   LB Dressing Min A  Mod I / Independent - with use of AE, as needed/appropriate   Bathing Min A  Mod I / Independent - with use of AE/DME, as needed/appropriate   Toileting SBA  Mod I / Independent   Bed Mobility  Supine-to-Sit: SBA  Sit-to-Supine: SBA      Functional Transfers Sit-to-Stand: SBA   from EOB  Independent   Functional Mobility SBA   (with walker) for household+ distance within patient's room and hallway. Mod I with functional mobility (with device, as needed/appropriate) in order to maximize independence with ADLs/IADLs and other functional tasks. Balance Sitting: Good  (at EOB)  Standing: Fair  (with walker)  Fair+ dynamic standing balance during completion of ADLs/IADLs and other functional tasks. Activity Tolerance Fair+  Patient will demonstrate Good understanding and consistent implementation of energy conservation techniques and work simplification techniques into ADL/IADL routines. Visual/  Perceptual WFL grossly     N/A   B UE Strength 4-/5  Patient will demonstrate 4+/5 B UE strength in order to maximize independence with ADLs/IADLs and functional transfers. Long-Term Goal: Patient will increase functional independence to PLOF in order to allow patient to live in least restrictive environment. 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 L UE WFL      Hearing: Impaired  Sensation: No complaints of numbness/tingling in B UEs. Tone: WFL  Edema: No    Comments: RN approved patient's participation in [address] [place] [address] activities. Upon arrival, patient supine in bed. At end of session, patient supine in bed (per patient preference) with call light and phone within reach, bed alarm activated, and all lines and tubes intact. Patient would benefit from continued skilled OT to increase safety and independence with completion of ADL/IADL tasks for functional independence and quality of life. Treatment: OT treatment provided this date included:    Instruction/training on safety and adapted techniques for completion of ADLs.   Instruction/training on safe functional mobility/transfer techniques.   Instruction/training on energy conservation/work simplification for completion of ADLs.      Patient education provided regardin) potential benefits of DME (e.g. shower chair) to maximize independence/safety with ADLs in home environment, 2) potential benefits of walker tray, 3) techniques to maximize safety with use of walker, particularly during ADLs and IADLs, 4) potential benefits of having assistance with IADLs, as needed, to ensure safety. Patient indicated Fair+ understanding; patient's daughter verbalized understanding. Further skilled OT treatment indicated to increase patient's safety and independence with completion of ADL/IADL tasks in order to maximize patient's functional independence and quality of life.     Assessment of Current Deficits:   Functional Mobility [x]  ADLs [x] Strength [x]  Cognition []  Functional Transfers  [x] IADLs [x] Safety Awareness [x]  Endurance [x]  Fine Motor Coordination [] Balance [x] Vision/Perception [] Sensation []   Gross Motor Coordination [] ROM [] Delirium []                  Motor Control []    Plan of Care: 2-5 days/week for 1-2 weeks PRN  [x]ADL retraining/adaptive techniques and AE recommendations to increase functional independence within precautions  [x]Energy conservation techniques to improve tolerance for ADLs/IADLs  [x]Functional transfer/mobility training/DME recommendations for increased independence, safety and fall prevention  [x]Patient/family education to increase safety and functional independence  [x]Environmental modifications for safe mobility and completion of ADLs/IADLs  []Cognitive retraining exercises to improve problem solving skills & safe participation in ADLs/IADLs   []Sensory re-education techniques to improve extremity awareness, maintain skin integrity and improve hand function   []Visual/Perceptual retraining  to improve body awareness and safety during transfers and ADLs   []Splinting/positioning needs to maintain joint/skin integrity and prevent contractures   [x]Therapeutic activity to improve functional performance during ADLs/IADLs  [x]Therapeutic exercise to improve tolerance and functional strength for ADLs/IADLs  [x]Balance retraining/tolerance tasks for facilitation of postural control with dynamic challenges during ADLs   [x]Neuromuscular re-education: facilitate righting/equilibrium reactions, midline orientation, scapular stability/mobility, normalize muscle tone, and facilitate active functional movement/attention  []Delirium prevention/treatment   []Positioning to improve functional independence and prevent skin breakdown   []Other:     Rehab Potential: Good for established goals. Patient / Family Goal: Patient indicated that he anticipates returning home upon discharge. Patient and/or family were instructed on functional diagnosis, prognosis/goals, and OT plan of care. Demonstrated Good understanding. Eval Complexity: Low    Time In: 1358  Time Out: 1432  Total Treatment Time: 14 minutes      Minutes Units   OT Eval Low 43513 20 1   OT Eval Medium 85639     OT Eval High 58982     OT Re-Eval [id number]     Therapeutic Ex 44860     Therapeutic Activities 89513     ADL/Self Care 19678 14 1   Orthotic Management 60582     Neuro Re-Ed 70545     Non-Billable Time N/A ---     Evaluation time includes thorough review of current medical information, gathering information on past medical history/social history and prior level of function, completion of standardized testing/informal observation of tasks, assessment of data, and education on plan of care and goals. PATI Winter/L  License Number: [id number]

## 2021-04-08 NOTE — H&P
Ebonie Moralez History and Physical      CHIEF COMPLAINT:  weakness    History Obtained From:  Patient    HISTORY OF PRESENT ILLNESS:    The patient is a 80 y.o. male with significant past medical history of hypertension, Raynaud's, hard of hearing who presents to the emergency department yesterday with a complaint of unsteady gait and \"not acting himself\". Patient is accompanied by daughter. Daughter and patient reporting that the night before last the patient ate dinner and had 4 bouts of emesis. Patient also states that he \"worked out\" more 2 days ago than normal saying that throughout the day he worked out for 4 hours doing cardio and weightlifting. Patient states he normally does 2 hours a day. Patient states he believes he \"overdid it\". Patient slept well through the night but woke up with a shaking episode to the upper extremities that was described as tremors that lasted approximately 30 minutes and then resolved. Daughter states that through the day yesterday patient has just been appearing pale and not acting himself, when he walks he is unsteady gait and has to lean up against a wall to balance himself patient without any falls, loss of conscious, did not strike his head. Patient is not on anticoagulation. She has suffered no other trauma. he denies any chest pain, shortness of breath, headache, nausea, vomiting today, diarrhea. Patient denies any blurred or double vision. Patient denies any dizziness. Patient denies any fever or chills. Patient does endorse body aches and malaise. Patient denies any recent illness or sick contacts. Past Medical History:     has a past medical history of Lea's esophagus, Carotid atherosclerosis, History of stress test (05/2018), [place] (hard of hearing), Hypertension, Raynaud's disease, and Thyroid disease. Past Surgical History:     has a past surgical history that includes Hemorrhoid surgery; eye surgery (Bilateral);  Endoscopy, colon, diagnostic; Colonoscopy; Tonsillectomy; Finger amputation; hernia repair (05/20/2018); and Upper gastrointestinal endoscopy (N/A, 1/16/2019). Patient had TURP about 2 months ago. Medications Prior to Admission:    Medications Prior to Admission: pramipexole (MIRAPEX) 0.5 MG tablet, Take 0.5 mg by mouth Daily at hs  aspirin EC 81 MG EC tablet, Take 81 mg by mouth daily  fluticasone (FLONASE) 50 MCG/ACT nasal spray, 1 spray by Each Nostril route daily  cetirizine (ZYRTEC) 5 MG tablet, Take 5 mg by mouth daily  melatonin 3 MG TABS tablet, Take 3 mg by mouth daily Daily at night  amLODIPine (NORVASC) 5 MG tablet, Take 2.5 mg by mouth nightly   levothyroxine (SYNTHROID) 100 MCG tablet, Take 100 mcg by mouth Daily  pantoprazole (PROTONIX) 20 MG tablet, Take 20 mg by mouth every other day Instructed to take with sip water am of procedure    Allergies:  Patient has no known allergies. Social History:    reports that he has never smoked. He has never used smokeless tobacco. He reports current alcohol use of about 1.0 standard drinks of alcohol per week. He reports that he does not use drugs.     Family History:       Problem Relation Age of Onset    Stroke Mother     Other Father     Heart Disease Brother        REVIEW OF SYSTEMS:  CONSTITUTIONAL:  positive for  chills  RESPIRATORY:  negative for  dry cough, cough with sputum, dyspnea, wheezing and chest pain  CARDIOVASCULAR:  negative for  chest pain, dyspnea, edema, syncope  GASTROINTESTINAL:  negative for diarrhea and constipation  GENITOURINARY:  negative for frequency and dysuria  MUSCULOSKELETAL:  negative for  myalgias and arthralgias  NEUROLOGICAL:  negative for headaches, dysphagia and syncope    PHYSICAL EXAM:  Vitals:  BP (!) 115/56   Pulse 75   Temp 98.4 °F (36.9 °C) (Oral)   Resp 16   Ht 5' 11\" (1.803 m)   Wt 140 lb 11.2 oz (63.8 kg)   SpO2 96%   BMI 19.62 kg/m²   CONSTITUTIONAL:  awake, alert, cooperative, no apparent distress, and appears stated age  EYES: 04/08/2021    BASOPCT 0.2 04/08/2021    MONOSABS 0.63 04/08/2021    LYMPHSABS 1.63 04/08/2021    EOSABS 0.04 04/08/2021    BASOSABS 0.02 04/08/2021     CMP:    Lab Results   Component Value Date     04/08/2021    K 3.7 04/08/2021     04/08/2021    CO2 24 04/08/2021    BUN 22 04/08/2021    CREATININE 0.8 04/08/2021    GFRAA >60 04/08/2021    LABGLOM >60 04/08/2021    GLUCOSE 109 04/08/2021    PROT 5.6 04/08/2021    LABALBU 3.0 04/08/2021    CALCIUM 8.4 04/08/2021    BILITOT 0.7 04/08/2021    ALKPHOS 67 04/08/2021    AST 19 04/08/2021    ALT 9 04/08/2021     PT/INR:  No results found for: PROTIME, INR  Last 3 Troponin:    Lab Results   Component Value Date    TROPONINI <0.01 04/07/2021    CKTOTAL 52 04/07/2021     TSH:  No results found for: TSH  Radiology Review:  CXR showing infiltrates in right lung    ASSESSMENT AND PLAN:    Patient Active Problem List    Diagnosis Date Noted    Pneumonia 04/07/2021     Priority: High    UTI (urinary tract infection) 04/07/2021     Priority: High    Carotid atherosclerosis      Priority: Low    Hypertension      Priority: Low    Raynaud's disease      Priority: Low    Thyroid disease      Priority: Low         Admit the patient. Continue Rocephin and doxycycline to treat pneumonia and probable UTI.

## 2021-04-08 NOTE — ED NOTES
SBAR report faxed to 5th floor, verified receipt with Adia Amaya.      Anneliese Kolb RN  04/07/21 1918

## 2021-04-09 PROCEDURE — 97530 THERAPEUTIC ACTIVITIES: CPT

## 2021-04-09 PROCEDURE — 97535 SELF CARE MNGMENT TRAINING: CPT

## 2021-04-09 PROCEDURE — 6360000002 HC RX W HCPCS: Performed by: FAMILY MEDICINE

## 2021-04-09 PROCEDURE — 1200000000 HC SEMI PRIVATE

## 2021-04-09 PROCEDURE — 2580000003 HC RX 258: Performed by: FAMILY MEDICINE

## 2021-04-09 PROCEDURE — 6370000000 HC RX 637 (ALT 250 FOR IP): Performed by: FAMILY MEDICINE

## 2021-04-09 RX ADMIN — ENOXAPARIN SODIUM 40 MG: 40 INJECTION SUBCUTANEOUS at 08:52

## 2021-04-09 RX ADMIN — SODIUM CHLORIDE: 9 INJECTION, SOLUTION INTRAVENOUS at 05:35

## 2021-04-09 RX ADMIN — DOXYCYCLINE HYCLATE 100 MG: 100 CAPSULE ORAL at 20:17

## 2021-04-09 RX ADMIN — CEFTRIAXONE 1000 MG: 1 INJECTION, POWDER, FOR SOLUTION INTRAMUSCULAR; INTRAVENOUS at 18:42

## 2021-04-09 RX ADMIN — TRAZODONE HYDROCHLORIDE 150 MG: 150 TABLET ORAL at 20:17

## 2021-04-09 RX ADMIN — DOXYCYCLINE HYCLATE 100 MG: 100 CAPSULE ORAL at 08:52

## 2021-04-09 RX ADMIN — PRAMIPEXOLE DIHYDROCHLORIDE 0.5 MG: 0.25 TABLET ORAL at 18:42

## 2021-04-09 RX ADMIN — ACETAMINOPHEN 650 MG: 325 TABLET ORAL at 10:27

## 2021-04-09 RX ADMIN — LEVOTHYROXINE SODIUM 100 MCG: 100 TABLET ORAL at 05:35

## 2021-04-09 RX ADMIN — ACETAMINOPHEN 650 MG: 325 TABLET ORAL at 00:07

## 2021-04-09 RX ADMIN — SODIUM CHLORIDE, PRESERVATIVE FREE 10 ML: 5 INJECTION INTRAVENOUS at 20:17

## 2021-04-09 ASSESSMENT — PAIN DESCRIPTION - FREQUENCY: FREQUENCY: INTERMITTENT

## 2021-04-09 ASSESSMENT — PAIN SCALES - GENERAL
PAINLEVEL_OUTOF10: 0
PAINLEVEL_OUTOF10: 0
PAINLEVEL_OUTOF10: 5

## 2021-04-09 ASSESSMENT — PAIN - FUNCTIONAL ASSESSMENT: PAIN_FUNCTIONAL_ASSESSMENT: ACTIVITIES ARE NOT PREVENTED

## 2021-04-09 ASSESSMENT — PAIN DESCRIPTION - DESCRIPTORS: DESCRIPTORS: HEADACHE

## 2021-04-09 ASSESSMENT — PAIN DESCRIPTION - LOCATION: LOCATION: HEAD

## 2021-04-09 ASSESSMENT — PAIN DESCRIPTION - PAIN TYPE: TYPE: ACUTE PAIN

## 2021-04-09 ASSESSMENT — PAIN DESCRIPTION - ONSET: ONSET: ON-GOING

## 2021-04-09 ASSESSMENT — PAIN DESCRIPTION - PROGRESSION: CLINICAL_PROGRESSION: GRADUALLY WORSENING

## 2021-04-09 NOTE — PLAN OF CARE
Problem: Falls - Risk of:  Goal: Will remain free from falls  Description: Will remain free from falls  4/8/2021 1832 by Cesar Tejada RN  Outcome: Met This Shift  Goal: Absence of physical injury  Description: Absence of physical injury  Outcome: Met This Shift

## 2021-04-09 NOTE — PROGRESS NOTES
Occupational Therapy  OT BEDSIDE TREATMENT NOTE      Date:2021  Patient Name: Jason Yanez  MRN: [id number]  : 1931  Room: [address] [place] [address]     Referring Provider: Adrien Govea MD  Evaluating OT: Rubin Paniagua. Dana Elida [id number]     AM-PAC Daily Activity Raw Score: 19/24     Recommended Adaptive Equipment: Fold-Away Walker Tray     Diagnosis: Sepsis ([address] [A41.9]      Pertinent Medical History: HTN, Raynaud's disease      Precautions: falls, bed alarm, hearing impairment     Home Living: Patient lives alone in a one-floor condo (one step to enter from garage). Bathroom Setup: walk-in shower (with built-in seat) and standard-height toilet  Equipment Owned: shower chair (available), emergency alert button     Prior Level of Function (PLOF): Per patient and patient's daughter, patient was independent with ADLs, IADLs, and functional mobility (without device) prior to this hospitalization. Driving: Yes     Pain Level: Patient reported experiencing abdominal discomfort secondary to being unable to move his bowels; nursing aware of patient's complaint. Cognition: Patient alert and oriented grossly; confusion demonstrated occasionally. WFL command follow demonstrated. Patient pleasant and cooperative.               Memory: Fair              Sequencing: Fair              Problem Solving: Fair              Judgement/Safety: Fair                          Impulsivity demonstrated.     Functional Assessment:    Initial Eval Status  Date: 2021 Treatment Status  Date: 21 Short Term Goals   Feeding Independent       Grooming SBA   Mod I / Independent  (seated/standing at sink)   UB Dressing SBA   Independent   LB Dressing Min A   Mod I / Independent - with use of AE, as needed/appropriate   Bathing Min A   Mod I / Independent - with use of AE/DME, as needed/appropriate   Toileting SBA SBA with use of grab bar when standing for support  Mod I / Independent   Bed Mobility  Supine-to-Sit: SBA  Sit-to-Supine: SBA Supine to sit: Supervision      Functional Transfers Sit-to-Stand: SBA   from EOB  STS: Supervision from EOB and commode Independent   Functional Mobility SBA   (with walker) for household+ distance within patient's room and hallway.  SBA using ww household plus distance. Mod I with functional mobility (with device, as needed/appropriate) in order to maximize independence with ADLs/IADLs and other functional tasks. Balance Sitting: Good  (at EOB)  Standing: Fair  (with walker) Sitting: Independent  Standing: SBA  Fair+ dynamic standing balance during completion of ADLs/IADLs and other functional tasks. Activity Tolerance Fair+ Fair+  Patient will demonstrate Good understanding and consistent implementation of energy conservation techniques and work simplification techniques into ADL/IADL routines. Visual/  Perceptual WFL grossly      N/A   B UE Strength 4-/5 B UE WFL during tx  Patient will demonstrate 4+/5 B UE strength in order to maximize independence with ADLs/IADLs and functional transfers         Treatment: Upon arrival pt was supine in bed. Instructed pt on AD management during toilet transfers and mobility. Pt exhibited one episode of LOB during tx with ability to self correct. At end of session pt was transferred to chair with alarm on, all lines and tubes intact and call light within reach. Education: Transfer training, ADL safety, activity pacing and AD management. · Pt has made good progress towards set goals.        Plan of Care: 2-5 days/week for 1-2 weeks PRN  [x]? ?ADL retraining/adaptive techniques and AE recommendations to increase functional independence within precautions  [x]? ? Energy conservation techniques to improve tolerance for ADLs/IADLs  [x]? ? Functional transfer/mobility training/DME recommendations for increased independence, safety and fall prevention  [x]? ?Patient/family education to increase safety and functional independence  [x]? ? Environmental modifications for safe

## 2021-04-09 NOTE — PROGRESS NOTES
Progress Note  Date:2021       [id number]  Patient Elias Hammer     Date of Birth:     Age:89 y.o. Subjective    Subjective afebrile, oxygenation good  bp stable, I looked back at old records, he sees pulmonary at [facility], this is all old, no pneumonia but urine with 100,000 gm pos cocci, no n/v/d, no major cough or sob,  Review of Systems  Objective         Vitals Last 24 Hours:  TEMPERATURE:  Temp  Av °F (36.7 °C)  Min: 97.9 °F (36.6 °C)  Max: 98.1 °F (36.7 °C)  RESPIRATIONS RANGE: Resp  Av  Min: 16  Max: 18  PULSE OXIMETRY RANGE: SpO2  Av %  Min: 94 %  Max: 98 %  PULSE RANGE: Pulse  Av  Min: 69  Max: 81  BLOOD PRESSURE RANGE: Systolic (46XCV), IXR:030 , Min:122 , IHR:956   ; Diastolic (98LSG), PWE:59, Min:58, Max:66    I/O (24Hr): Intake/Output Summary (Last 24 hours) at 2021 0717  Last data filed at 2021 2300  Gross per 24 hour   Intake 480 ml   Output --   Net 480 ml     Objective  Labs/Imaging/Diagnostics    Labs:  CBC:  Recent Labs     21  1515 21  0310   WBC 10.8 9.4   RBC 3.99 3.40*   HGB 12.5 10.5*   HCT 36.9* 31.5*   MCV 92.5 92.6   RDW 13.3 13.5    170     CHEMISTRIES:  Recent Labs     21  1515 21  0310   * 133   K 3.6 3.7   CL 97* 102   CO2 24 24   BUN 22 22   CREATININE 1.1 0.8   GLUCOSE 109* 109*     PT/INR:No results for input(s): PROTIME, INR in the last 72 hours. APTT:No results for input(s): APTT in the last 72 hours.   LIVER PROFILE:  Recent Labs     21  1515 21  0310   AST 17 19   ALT 10 9   BILITOT 0.9 0.7   ALKPHOS 84 67       Imaging Last 24 Hours:  Ct Head Wo Contrast    Result Date: 2021  EXAMINATION: CT OF THE HEAD WITHOUT CONTRAST  2021 6:21 pm TECHNIQUE: CT of the head was performed without the administration of intravenous contrast. Dose modulation, iterative reconstruction, and/or weight based adjustment of the mA/kV was utilized to reduce the radiation dose to as low as reasonably achievable. COMPARISON: None. HISTORY: ORDERING SYSTEM PROVIDED HISTORY: weakness TECHNOLOGIST PROVIDED HISTORY: Has a \"code stroke\" or \"stroke alert\" been called? ->No Reason for exam:->weakness FINDINGS: BRAIN/VENTRICLES: Age related cortical atrophy and periventricular white matter ischemic changes. There is no acute intracranial hemorrhage, mass effect or midline shift. No abnormal extra-axial fluid collection. The gray-white differentiation is maintained without evidence of an acute infarct. There is no evidence of hydrocephalus. 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 ORBITS: The visualized portion of the orbits demonstrate no acute abnormality. SINUSES: Mucosal thickening in the left maxillary, ethmoid and frontal sinuses. SOFT TISSUES/SKULL:  No acute abnormality of the visualized skull or soft tissues. No acute intracranial abnormality. Left maxillary, ethmoid and frontal sinusitis. Xr Chest Portable    Result Date: 4/7/2021  EXAMINATION: ONE XRAY VIEW OF THE CHEST 4/7/2021 3:47 pm COMPARISON: None. HISTORY: ORDERING SYSTEM PROVIDED HISTORY: weakness TECHNOLOGIST PROVIDED HISTORY: Reason for exam:->weakness FINDINGS: Presence of ground-glass densities in the mid lower aspect of the right lung. The left lung appears to be clear with some prominence of the bronchovascular markings towards the base. The heart has normal size. There is no pleural effusions. There is no perihilar vascular congestion. Multifocal multi lobar pneumonic process in the right lung.      Awake, alert  Oriented x 3  Hydrated  Heart rrr  Lungs essentially clear, no cyanosis  abd soft, good bs, no pain  No edema or cyanosis  Assessment//Plan           Hospital Problems           Last Modified POA    * (Principal) Pneumonia 4/8/2021 Yes    UTI (urinary tract infection) 4/7/2021 Yes    Carotid atherosclerosis (Chronic) 4/7/2021 Yes    Hypertension (Chronic) 4/7/2021 Yes    Raynaud's disease (Chronic) 4/7/2021 Yes    Thyroid disease (Chronic) 4/7/2021 Yes        Assessment & Plan   uti with metabolic encephalopathy  hypotension from early dehydration  Chronic fibrosis lungs  Left sinusitis  Hypothyroidism  Await cultures, iv ab  Electronically signed by Doreene Harada, DO on 4/9/21 at 7:17 AM EDT

## 2021-04-10 VITALS
DIASTOLIC BLOOD PRESSURE: 54 MMHG | WEIGHT: 147 LBS | BODY MASS INDEX: 20.58 KG/M2 | OXYGEN SATURATION: 93 % | HEART RATE: 65 BPM | TEMPERATURE: 97.6 F | SYSTOLIC BLOOD PRESSURE: 112 MMHG | RESPIRATION RATE: 18 BRPM | HEIGHT: 71 IN

## 2021-04-10 PROBLEM — J18.9 PNEUMONIA: Status: RESOLVED | Noted: 2021-04-07 | Resolved: 2021-04-10

## 2021-04-10 LAB
ORGANISM: ABNORMAL
URINE CULTURE, ROUTINE: ABNORMAL

## 2021-04-10 PROCEDURE — 6370000000 HC RX 637 (ALT 250 FOR IP): Performed by: FAMILY MEDICINE

## 2021-04-10 PROCEDURE — 2580000003 HC RX 258: Performed by: FAMILY MEDICINE

## 2021-04-10 PROCEDURE — 6360000002 HC RX W HCPCS: Performed by: FAMILY MEDICINE

## 2021-04-10 RX ORDER — DOXYCYCLINE HYCLATE 100 MG/1
100 CAPSULE ORAL EVERY 12 HOURS SCHEDULED
Qty: 14 CAPSULE | Refills: 0 | Status: SHIPPED | OUTPATIENT
Start: 2021-04-10 | End: 2021-04-17

## 2021-04-10 RX ADMIN — DOXYCYCLINE HYCLATE 100 MG: 100 CAPSULE ORAL at 09:13

## 2021-04-10 RX ADMIN — PANTOPRAZOLE SODIUM 20 MG: 20 TABLET, DELAYED RELEASE ORAL at 09:13

## 2021-04-10 RX ADMIN — SODIUM CHLORIDE, PRESERVATIVE FREE 10 ML: 5 INJECTION INTRAVENOUS at 09:14

## 2021-04-10 RX ADMIN — LEVOTHYROXINE SODIUM 100 MCG: 100 TABLET ORAL at 06:45

## 2021-04-10 RX ADMIN — ENOXAPARIN SODIUM 40 MG: 40 INJECTION SUBCUTANEOUS at 09:13

## 2021-04-10 NOTE — PLAN OF CARE
Problem: Falls - Risk of:  Goal: Will remain free from falls  Description: Will remain free from falls  4/10/2021 0247 by Norma Whitman RN  Outcome: Met This Shift  4/9/2021 1910 by Dominick Alonso RN  Outcome: Met This Shift     Problem: Pain:  Goal: Pain level will decrease  Description: Pain level will decrease  Outcome: Met This Shift

## 2021-04-10 NOTE — CARE COORDINATION
Social Work / Discharge Planning : Patient has discharge order in. SW spoke to MICHAEL from [facility] and they will be out to deliver walker. Patient and daughter aware. SW to follow.  Electronically signed by DARLIN Calles on 4/10/21 at 11:21 AM EDT

## 2021-04-10 NOTE — PROGRESS NOTES
Admit Date: 4/7/2021    Subjective:     Patient states he is feeling well. He is eager to leave the hospital by 11:30 due to Medicare/Insurance issues. Scheduled Meds:   pramipexole  0.5 mg Oral Nightly    levothyroxine  100 mcg Oral Daily    pantoprazole  20 mg Oral Every Other Day    traZODone  150 mg Oral Nightly    sodium chloride flush  5-40 mL Intravenous 2 times per day    enoxaparin  40 mg Subcutaneous Daily    doxycycline hyclate  100 mg Oral 2 times per day    cefTRIAXone (ROCEPHIN) IV  1,000 mg Intravenous Q24H     Continuous Infusions:   sodium chloride       PRN Meds:sodium chloride flush, sodium chloride, promethazine **OR** ondansetron, polyethylene glycol, acetaminophen **OR** acetaminophen      Objective:     No intake/output data recorded. No intake/output data recorded.   BP (!) 112/54   Pulse 65   Temp 97.6 °F (36.4 °C) (Oral)   Resp 18   Ht 5' 11\" (1.803 m)   Wt 147 lb (66.7 kg)   SpO2 93%   BMI 20.50 kg/m²     LUNGS:  No increased work of breathing, good air exchange, clear to auscultation bilaterally, no crackles or wheezing  CARDIOVASCULAR:  Normal apical impulse, regular rate and rhythm, normal S1 and S2, no S3 or S4, and no murmur noted  ABDOMEN:  Flat,soft, non-tender  MUSCULOSKELETAL:  No cyanosis, no edema, no clubbing    Data Review  CBC:   Recent Labs     04/07/21  1515 04/08/21  0310   WBC 10.8 9.4   HGB 12.5 10.5*    170     BMP:    Recent Labs     04/07/21  1515 04/08/21  0310   * 133   K 3.6 3.7   CL 97* 102   CO2 24 24   BUN 22 22   CREATININE 1.1 0.8   GLUCOSE 109* 109*     Coagulation: No results found for: INR, APTT  Cardiac markers: No results found for: CKMB, TROPONINT, MYOGLOBIN  Lab Results   Component Value Date    LABALBU 3.0 (L) 04/08/2021     Lab Results   Component Value Date    ALT 9 04/08/2021    AST 19 04/08/2021    ALKPHOS 67 04/08/2021    BILITOT 0.7 04/08/2021         Assessment:     Patient Active Problem List    Diagnosis Date Noted    UTI (urinary tract infection) 04/07/2021     Priority: High    Carotid atherosclerosis      Priority: Low    Hypertension      Priority: Low    Raynaud's disease      Priority: Low    Thyroid disease      Priority: Low       Plan:     Discharge home on doxycycline. Urine culture shows Enterobacter faecalis susceptible to a number of antibiotics.

## 2021-04-12 LAB
BLOOD CULTURE, ROUTINE: NORMAL
CULTURE, BLOOD 2: NORMAL

## 2021-04-29 NOTE — DISCHARGE SUMMARY
dictated
seen by Pulmonary at [facility] and the changes in his  lungs were old. No pneumonia was present, but UA did show about 100,000  Gram-positive cocci. Diagnoses were UTI with metabolic encephalopathy,  hypotension from early dehydration, chronic fibrosis of the lungs, CT  showed left sinusitis, and hypothyroidism. Cultures were pending and he  was continued on IV antibiotics. By 04/10, the patient continued to  remain afebrile. He was more alert and oriented. He felt stronger, and  it was felt that he was able to be discharged home. UA showed positive  Enterobacter faecalis, susceptible to antibiotics. DISPOSITION AND AFTERCARE:  He would be going home with family. Continued on fluids p.o. His gait was steady. Condition was stable. He was continued on doxycycline for seven more days. Continued his  Mirapex, aspirin, Flonase, Zyrtec, melatonin, Norvasc, Synthroid, and  Protonix. To be on a diet as tolerated, and follow up in the office in  one to two weeks.         Harley Mckeon DO      D: 04/28/2021 7:57:28       T: 04/28/2021 8:05:16     [email or web address]/[email or web address]  Job#: [id number]     Doc#: [id number]    CC:

## 2021-05-07 PROBLEM — N39.0 UTI (URINARY TRACT INFECTION): Status: RESOLVED | Noted: 2021-04-07 | Resolved: 2021-05-07

## 2021-06-29 ENCOUNTER — HOSPITAL ENCOUNTER (EMERGENCY)
Age: 86
Discharge: HOME | End: 2021-06-30
Attending: EMERGENCY MEDICINE
Payer: MEDICARE

## 2021-06-29 ENCOUNTER — APPOINTMENT (OUTPATIENT)
Dept: GENERAL RADIOLOGY | Age: 86
End: 2021-06-29
Payer: MEDICARE

## 2021-06-29 DIAGNOSIS — S39.012A STRAIN OF LUMBAR REGION, INITIAL ENCOUNTER: Primary | ICD-10-CM

## 2021-06-29 PROCEDURE — 6360000002 HC RX W HCPCS: Performed by: EMERGENCY MEDICINE

## 2021-06-29 PROCEDURE — 6370000000 HC RX 637 (ALT 250 FOR IP): Performed by: EMERGENCY MEDICINE

## 2021-06-29 PROCEDURE — 99284 EMERGENCY DEPT VISIT MOD MDM: CPT

## 2021-06-29 PROCEDURE — 72100 X-RAY EXAM L-S SPINE 2/3 VWS: CPT

## 2021-06-29 PROCEDURE — 96372 THER/PROPH/DIAG INJ SC/IM: CPT

## 2021-06-29 RX ORDER — ONDANSETRON 4 MG/1
4 TABLET, ORALLY DISINTEGRATING ORAL ONCE
Status: COMPLETED | OUTPATIENT
Start: 2021-06-29 | End: 2021-06-29

## 2021-06-29 RX ORDER — ORPHENADRINE CITRATE 30 MG/ML
60 INJECTION INTRAMUSCULAR; INTRAVENOUS ONCE
Status: COMPLETED | OUTPATIENT
Start: 2021-06-29 | End: 2021-06-29

## 2021-06-29 RX ORDER — MORPHINE SULFATE 4 MG/ML
6 INJECTION, SOLUTION INTRAMUSCULAR; INTRAVENOUS ONCE
Status: COMPLETED | OUTPATIENT
Start: 2021-06-29 | End: 2021-06-29

## 2021-06-29 RX ORDER — KETOROLAC TROMETHAMINE 30 MG/ML
30 INJECTION, SOLUTION INTRAMUSCULAR; INTRAVENOUS ONCE
Status: COMPLETED | OUTPATIENT
Start: 2021-06-29 | End: 2021-06-29

## 2021-06-29 RX ADMIN — ORPHENADRINE CITRATE 60 MG: 60 INJECTION INTRAMUSCULAR; INTRAVENOUS at 21:48

## 2021-06-29 RX ADMIN — KETOROLAC TROMETHAMINE 30 MG: 30 INJECTION, SOLUTION INTRAMUSCULAR; INTRAVENOUS at 21:47

## 2021-06-29 RX ADMIN — MORPHINE SULFATE 6 MG: 4 INJECTION, SOLUTION INTRAMUSCULAR; INTRAVENOUS at 21:50

## 2021-06-29 RX ADMIN — ONDANSETRON 4 MG: 4 TABLET, ORALLY DISINTEGRATING ORAL at 21:46

## 2021-06-29 ASSESSMENT — PAIN DESCRIPTION - ORIENTATION: ORIENTATION: LOWER

## 2021-06-29 ASSESSMENT — PAIN SCALES - GENERAL
PAINLEVEL_OUTOF10: 10
PAINLEVEL_OUTOF10: 10

## 2021-06-29 ASSESSMENT — PAIN DESCRIPTION - DESCRIPTORS: DESCRIPTORS: SHARP;SHOOTING

## 2021-06-29 ASSESSMENT — PAIN DESCRIPTION - LOCATION: LOCATION: BACK

## 2021-06-30 VITALS
WEIGHT: 130 LBS | TEMPERATURE: 97.7 F | BODY MASS INDEX: 18.2 KG/M2 | OXYGEN SATURATION: 95 % | HEART RATE: 67 BPM | RESPIRATION RATE: 16 BRPM | HEIGHT: 71 IN | DIASTOLIC BLOOD PRESSURE: 55 MMHG | SYSTOLIC BLOOD PRESSURE: 118 MMHG

## 2021-06-30 RX ORDER — HYDROCODONE BITARTRATE AND ACETAMINOPHEN 5; 325 MG/1; MG/1
1 TABLET ORAL EVERY 6 HOURS PRN
Qty: 12 TABLET | Refills: 0 | Status: SHIPPED | OUTPATIENT
Start: 2021-06-30 | End: 2021-07-03

## 2021-06-30 RX ORDER — BACLOFEN 10 MG/1
TABLET ORAL
Qty: 30 TABLET | Refills: 0 | Status: SHIPPED | OUTPATIENT
Start: 2021-06-30 | End: 2021-10-31

## 2021-06-30 NOTE — ED PROVIDER NOTES
HPI:  6/29/21,   Time: 9:36 PM EDT         Shashank Maddox is a 80 y.o. male presenting to the ED for diffuse lower back pain when he bent over to get his glasses and then could not get back up, beginning just prior to arrival ago. The complaint has been constant, moderate in severity, and worsened by changing position. No bowel or bladder incontinence and no saddle anesthesia and no numbness or weakness of his legs. States he occasionally has back pain but nothing like this. No abdominal pain or urinary complaints    ROS:   Pertinent positives and negatives are stated within HPI, all other systems reviewed and are negative.  --------------------------------------------- PAST HISTORY ---------------------------------------------  Past Medical History:  has a past medical history of Lea's esophagus, Carotid atherosclerosis, History of stress test, [place] (hard of hearing), Hypertension, Raynaud's disease, and Thyroid disease. Past Surgical History:  has a past surgical history that includes Hemorrhoid surgery; eye surgery (Bilateral); Endoscopy, colon, diagnostic; Colonoscopy; Tonsillectomy; Finger amputation; hernia repair (05/20/2018); and Upper gastrointestinal endoscopy (N/A, 1/16/2019). Social History:  reports that he has never smoked. He has never used smokeless tobacco. He reports current alcohol use of about 1.0 standard drinks of alcohol per week. He reports that he does not use drugs. Family History: family history includes Heart Disease in his brother; Other in his father; Stroke in his mother. The patients home medications have been reviewed. Allergies: Patient has no known allergies. -------------------------------------------------- RESULTS -------------------------------------------------  All laboratory and radiology results have been personally reviewed by myself   LABS:  No results found for this visit on 06/29/21.     RADIOLOGY:  Interpreted by Radiologist.  XR LUMBAR SPINE (2-3 VIEWS)   Final Result   1. Mild age indeterminate compression fracture deformities in the T11, T12   and L1 vertebral bodies. Demineralized bones. 2. Degenerative changes, most notable at L4-5 and L5-S1.             ------------------------- NURSING NOTES AND VITALS REVIEWED ---------------------------   The nursing notes within the ED encounter and vital signs as below have been reviewed. BP (!) 118/55   Pulse 67   Temp 97.7 °F (36.5 °C) (Oral)   Resp 16   Ht 5' 11\" (1.803 m)   Wt 130 lb (59 kg)   SpO2 95%   BMI 18.13 kg/m²   Oxygen Saturation Interpretation: Normal      ---------------------------------------------------PHYSICAL EXAM--------------------------------------      Constitutional/General: Alert and oriented x3, well appearing, non toxic in mild distress secondary to pain  Head: NC/AT  Eyes: PERRL, EOMI  Mouth: Oropharynx clear, handling secretions, no trismus  Neck: Supple, full ROM, no meningeal signs  Pulmonary: Lungs clear to auscultation bilaterally, no wheezes, rales, or rhonchi. Not in respiratory distress  Cardiovascular:  Regular rate and rhythm, no murmurs, gallops, or rubs. 2+ distal pulses  Abdomen: Soft, non tender, non distended,   Extremities: Moves all extremities x 4. Warm and well perfused  Skin: warm and dry without rash  Neurologic: GCS 15,  Psych: Normal Affect  Low back: Mild LS sacroiliac paraspinal tenderness to palpation with slight spasm.   Straight leg raises are 45 degrees bilaterally without reproducible sciatica pain    ------------------------------ ED COURSE/MEDICAL DECISION MAKING----------------------  Medications   ketorolac (TORADOL) injection 30 mg (30 mg Intramuscular Given 6/29/21 2147)   orphenadrine (NORFLEX) injection 60 mg (60 mg Intramuscular Given 6/29/21 2148)   morphine sulfate (PF) injection 6 mg (6 mg Intramuscular Given 6/29/21 2150)   ondansetron (ZOFRAN-ODT) disintegrating tablet 4 mg (4 mg Oral Given 6/29/21 2146)         Medical Decision Making:    Suspected low back strain-we will attempt pain medicine and then ambulate and obtain x-rays    Counseling: The emergency provider has spoken with the patient and discussed todays results, in addition to providing specific details for the plan of care and counseling regarding the diagnosis and prognosis. Questions are answered at this time and they are agreeable with the plan.      --------------------------------- IMPRESSION AND DISPOSITION ---------------------------------    IMPRESSION  1.  Strain of lumbar region, initial encounter Controlled       DISPOSITION  Disposition: Discharge to home  Patient condition is stable      ED course: Feeling much better after pain shots was able to ambulate therefore to be discharged            Lawerence Bumpers, MD  06/30/21 0021

## 2021-06-30 NOTE — ED NOTES
Bed: 09  Expected date:   Expected time:   Means of arrival:   Comments:  KAREN Akbar, RN  76/83/45 2120

## 2021-10-19 ENCOUNTER — HOSPITAL ENCOUNTER (OUTPATIENT)
Dept: SPEECH THERAPY | Age: 86
Setting detail: THERAPIES SERIES
Discharge: HOME | End: 2021-10-19
Payer: MEDICARE

## 2021-10-19 PROCEDURE — 92610 EVALUATE SWALLOWING FUNCTION: CPT

## 2021-10-19 NOTE — PROGRESS NOTES
SPEECH/LANGUAGE PATHOLOGY  CLINICAL ASSESSMENT OF SWALLOWING FUNCTION   and PLAN OF CARE      PATIENT NAME:  Jose Orr  (male)     MRN:  [id number]    :  1931  (80 y.o.)  STATUS:  Outpatient clinic   TODAY'S DATE:  10/19/2021  REFERRING PROVIDER:    Dr. Welsh Meth: Consult to Speech Therapy, Clinical Swallowing Evaluation  Date of order:  21  REASON FOR REFERRAL:  Oropharyngeal dysphagia  EVALUATING THERAPIST: Mukund Garrison, MARINA  CERTIFICATION/RECERTIFICATION PERIOD: 10/19/2021 to 22  REFERRING DIAGNOSIS: Dysphagia, oropharyngeal phase [R13.12]      SIGNIFICANT INFORMATION:  Jose Orr was referred by Dr. Tiffanie Brand of [facility] to [address] [place] [address] Speech Pathology Department for dysphagia evaluation and treatment due to a diagnosis of oropharyngeal dysphagia (R13.12). Patient was accompanied to the session by his daughter,Alyssa Gregg. Together, they provided medical history information. Patient reported that his swallowing difficulty began approximately 1 year ago. He reported coughing while eating and drinking and liquid coming out of his nose when he bent over. He was initially seen by an ENT, Dr. Alyssia Malik. Patient reported that nasal sinus drainage was ruled out. He was referred GI, Dr. Cathy Mendoza who recommended a MBSS. Patient underwent a MBSS at [facility] and was diagnosed with dysphagia. A summary of the results of that assessment will be indicated below:    ANITHA Bartlett 33 ON 21 BY SLP, Judith Angles:  Patient was presented thin liquids, nectar thick liquids, honey thick liquids, pureed and solids. Oral phase for tongue control revealed posterior escape of less than half of the bolus. Oral residue collection was noted on the oral structure. Initiation of the pharyngeal swallow was noted as the bolus head was at the vallecular pit.   The pharyngeal phase of swallowing revealed partial superior movement of the thyroid cartilage and /or partial approximation of arytenoids to epiglottic petiole. Partial anterior hyoid excursion movement noted. Laryngeal vestibular closure/height of the swallow was incomplete with a narrow column of air/contrast in the laryngeal vestibule. The pharyngeal stripping wave was present although diminished. Tongue base retraction revealed a wide column of contrast of air between tongue base and pharyngeal wall. A collection of residue was noted within or on the pharyngeal structures. Patient required 3-4 re-swallows in order to reduce to trace-minimal amount. Complete esophageal clearance in an upright position was noted. Penetration was noted during the swallow for thin, nectar, and pureed consistencies. Aspiration after the swallow for thin liquids and nectar thick liquid by cup. Overall, patient demonstrated a oropharyngeal dysphagia. Diet recommendations were for a regular consistency diet with mildly thick liquids IDDSI level 2 (nectar thick) via teaspoon, medications crushed in puree (pudding/applesauce). Swallowing precautions recommendations: alternate bites/sips, double swallows, feed/eat at a slow rate, liquids presented, maintain an upright position for 20-30 minutes following all oral intake, no straw, sit upright 90 degrees for all PO intake, small bites/sips, throat clear, re-swallow, use extra moistening agents, reduced bite size. Liquids presented by spoon.                     RESULTS:    DYSPHAGIA DIAGNOSIS:   Clinical indicators of moderate-severe oropharyngeal phase dysphagia       DIET RECOMMENDATIONS:  Regular consistency solids (IDDSI level 7) with  nectar consistency (mildly thick - IDDSI level 2) liquids     FEEDING RECOMMENDATIONS:     Assistance level:  No assistance needed      Compensatory strategies recommended:   · Double swallow,   · Small bites/sips,   · Alternate solids and liquids,   · Liquids by teaspoon only, · Throat clear with immediate re-swallow  · No straw  · Slow rate  · 90 degree posture during all intake and for 20-30 minutes post intake  · Medications crushed in puree medium  · Use extra moistening agents      Discussed recommendations with nursing and/or faxed report to referring provider: Yes    SPEECH THERAPY  PLAN OF CARE   The dysphagia POC is established based on physician order, dysphagia diagnosis and results of clinical assessment     Outpatient Skilled SLP intervention for dysphagia management is recommended 2 times per week for 30 minute sessions for 24 visits in order to address the established treatment plan    Conditions Requiring Skilled Therapeutic Intervention for dysphagia:    Oral motor strength/coordination impairment  Swallow triggered when bolus head at level of laryngeal surface of epiglottis increasing risk of aspiration  Throat clearing during PO intake   Coughing during PO intake    Sensation of food sticking in throat     Specific dysphagia interventions to include:     Training in positioning for improved integrity of swallow  Compensatory strategy training   Therapeutic exercises  Deep Pharyngeal Neuromuscular Stimulation (DPNS) if available     Specific instructions for next treatment:  initiate instruction of therapeutic exercises and initiate instruction of compensatory strategies    Patient Treatment Goals:    Short Term Goals:  1. Pt will implement identified compensatory swallowing strategies on 90% of opportunities or greater to improve airway protection and swallow function. 2. Pt will complete oral motor strength/ coordination exercises to improve bolus prep/ control and mastication with  minimal verbal prompts . 3. Pt will complete BOTR strength/ ROM exercises to reduce pharyngeal residuals and improve epiglottic inversion minimal verbal prompts  4.  Pt will complete laryngeal strength/ ROM therapeutic exercises to improve airway protection for the least restrictive PO diet minimal verbal prompts  5. Pt will participate in DPNS to address functional deficits identified during swallow evaluation    Long Term Goals:  1. Pt will maintain adequate nutrition/hydration via PO intake of the least restrictive oral diet with implementation of safe swallow/ compensatory strategies and decrease signs/symptoms of aspiration to less than 1 x/day. 2. Pt will improve oropharyngeal swallow function to ensure airway protection during PO intake to maintain adequate nutrition/hydration and decrease signs/symptoms of aspiration to less than 1 x/day.    3. Repeat Video Swallow Study (MBSS) is recommended after 18-24 visits and requires a new physician order     Patient/family Goal:    To eat/drink without coughing or choking    Plan of care discussed with Patient and Family   The Patient and Family understand(s) the diagnosis, prognosis and plan of care     Rehabilitation Potential/Prognosis: good                    ADMITTING DIAGNOSIS: Dysphagia, oropharyngeal phase [R13.12]    VISIT DIAGNOSIS: dysphagia     PATIENT REPORT/COMPLAINT: coughing while eating and drinking, liquid escaping from the nose when bent over      PRIOR LEVEL OF SWALLOW FUNCTION:    PAST HISTORY OF DYSPHAGIA?: yes for the past year    Home diet: Regular consistency solids (IDDSI level 7) with nectar consistency (mildly thick - IDDSI level 2) liquids    PROCEDURE:  Consistencies Administered During the Evaluation   Liquids: nectar thick liquid   Solids:  pureed foods and solid foods      Method of Intake:   cup, spoon  Self fed      Position:   Seated, upright    CLINICAL ASSESSMENT:  Oral Stage:       Delayed A-P transit due to: reduced lingual strength       Pharyngeal Stage:    Throat clearing present after presentation of thin liquid, pudding consistency liquid and solid foods  Latent cough was noted after presentation of all consistencies administered  Multiple swallows were noted after presentation of all consistencies administered  Delayed initiation of the pharyngeal swallow noted    Cognition:   Within functional limits for this exam    Oral Peripheral Examination   Generalized oral weakness    Current Respiratory Status    room air     Parameters of Speech Production  Respiration:  Adequate for speech production  Quality:   Within functional limits  Intensity: Within functional limits    Volitional Swallow: present     Volitional Cough:   present     Pain: No pain reported. EDUCATION:   The Speech Language Pathologist (SLP) completed education regarding results of evaluation and that intervention is warranted at this time. Learner: Patient and Family  Education: Reviewed results and recommendations of this evaluation  Evaluation of Education:  Christy Moore understanding    This plan may be re-evaluated and revised as warranted. Treatment goals discussed with Patient and Family   The Patient and Family understand(s) the diagnosis, prognosis and plan of care     Evaluation Time includes thorough review of current medical information, gathering information on past medical history/social history and prior level of function, completion of standardized testing/informal observation of tasks, assessment of data and education on plan of care and goals. CPT Codes    Evaluation: CPT CODE:       35267  bedside swallow eval      Treatment: CPT CODE:       71765  [place]agia tx  21409  oral motor exercises (15 min)  51034  neuromuscular re-education,  DPNS      The admitting diagnosis and active problem list, as listed below have been reviewed prior to initiation of this evaluation. ACTIVE PROBLEM LIST:   Patient Active Problem List   Diagnosis    Carotid atherosclerosis    Hypertension    Raynaud's disease    Thyroid disease       Jeannie Johnson M.S., CCC-SLP/L  Speech-Language Pathologist      [address]     Phone: [phone]     If you have any questions or concerns, please don't hesitate to call. Thank you for your referral.    Physician/Provider Signature:________________________________Date:__________________  By signing above, the therapists plan is approved by the physician/provider. All patients under [employer] must be signed by physician/provider.

## 2021-10-26 ENCOUNTER — IMMUNIZATION (OUTPATIENT)
Dept: PRIMARY CARE CLINIC | Age: 86
End: 2021-10-26
Payer: MEDICARE

## 2021-10-26 PROCEDURE — 91300 COVID-19, PFIZER VACCINE 30MCG/0.3ML DOSE: CPT | Performed by: NURSE PRACTITIONER

## 2021-10-26 PROCEDURE — 0003A COVID-19, PFIZER VACCINE 30MCG/0.3ML DOSE: CPT | Performed by: NURSE PRACTITIONER

## 2021-10-28 ENCOUNTER — HOSPITAL ENCOUNTER (OUTPATIENT)
Dept: SPEECH THERAPY | Age: 86
Setting detail: THERAPIES SERIES
Discharge: HOME | End: 2021-10-28
Payer: MEDICARE

## 2021-10-28 PROCEDURE — 92526 ORAL FUNCTION THERAPY: CPT

## 2021-10-28 NOTE — PROGRESS NOTES
Patient was seen for dysphagia therapy. Temperature was taken prior to the session. [facility] [facility] COVID-19 screening questionnaire was negative. Mask was worn by SLP. Patient wore a mask when appropriate. Therapy targeted the following: An oral motor exercise program was initiated with Isabella Lainez. Each exercise was modeled for the patient. Isabella Lainez completed fair-good return demonstration of each exercise. Oral apraxia noted. A handout was provided for home practice. Lingual strength, endurance, and range of motion was rated as fair-good. Labial strength, endurance, and range of motion was rated as poor-fair. Laryngeal strength, endurance, and range of motion was rated as fair. Continue plan of care. Treatment plan and goals can be found in the initial evaluation/progress report. Jeannie Castaneda M.S., [facility]-SLP/L  Speech-Language Pathologist    CPT CODE:       26022  dysphagia tx

## 2021-10-31 ENCOUNTER — APPOINTMENT (OUTPATIENT)
Dept: CT IMAGING | Age: 86
End: 2021-10-31
Payer: MEDICARE

## 2021-10-31 ENCOUNTER — APPOINTMENT (OUTPATIENT)
Dept: GENERAL RADIOLOGY | Age: 86
End: 2021-10-31
Payer: MEDICARE

## 2021-10-31 ENCOUNTER — HOSPITAL ENCOUNTER (EMERGENCY)
Age: 86
Discharge: HOME | End: 2021-10-31
Attending: EMERGENCY MEDICINE
Payer: MEDICARE

## 2021-10-31 VITALS
DIASTOLIC BLOOD PRESSURE: 55 MMHG | BODY MASS INDEX: 18.2 KG/M2 | SYSTOLIC BLOOD PRESSURE: 119 MMHG | TEMPERATURE: 98.6 F | WEIGHT: 130 LBS | RESPIRATION RATE: 16 BRPM | HEIGHT: 71 IN | OXYGEN SATURATION: 93 % | HEART RATE: 90 BPM

## 2021-10-31 DIAGNOSIS — R53.83 LETHARGY: ICD-10-CM

## 2021-10-31 DIAGNOSIS — E86.0 DEHYDRATION: Primary | ICD-10-CM

## 2021-10-31 LAB
ALBUMIN SERPL-MCNC: 3.4 G/DL (ref 3.5–5.2)
ALP BLD-CCNC: 75 U/L (ref 40–129)
ALT SERPL-CCNC: 11 U/L (ref 0–40)
ANION GAP SERPL CALCULATED.3IONS-SCNC: 9 MMOL/L (ref 7–16)
AST SERPL-CCNC: 20 U/L (ref 0–39)
ATYPICAL LYMPHOCYTE RELATIVE PERCENT: 1.7 % (ref 0–4)
BASOPHILS ABSOLUTE: 0 E9/L (ref 0–0.2)
BASOPHILS RELATIVE PERCENT: 0 % (ref 0–2)
BILIRUB SERPL-MCNC: 0.5 MG/DL (ref 0–1.2)
BILIRUBIN URINE: NEGATIVE
BLOOD, URINE: NEGATIVE
BUN BLDV-MCNC: 20 MG/DL (ref 6–23)
CALCIUM SERPL-MCNC: 9.4 MG/DL (ref 8.6–10.2)
CHLORIDE BLD-SCNC: 100 MMOL/L (ref 98–107)
CHP ED QC CHECK: NORMAL
CLARITY: CLEAR
CO2: 27 MMOL/L (ref 22–29)
COLOR: YELLOW
CREAT SERPL-MCNC: 0.7 MG/DL (ref 0.7–1.2)
EOSINOPHILS ABSOLUTE: 0 E9/L (ref 0.05–0.5)
EOSINOPHILS RELATIVE PERCENT: 0 % (ref 0–6)
GFR AFRICAN AMERICAN: >60
GFR NON-AFRICAN AMERICAN: >60 ML/MIN/1.73
GLUCOSE BLD-MCNC: 109 MG/DL
GLUCOSE BLD-MCNC: 113 MG/DL (ref 74–99)
GLUCOSE URINE: NEGATIVE MG/DL
HCT VFR BLD CALC: 35.3 % (ref 37–54)
HEMOGLOBIN: 11.7 G/DL (ref 12.5–16.5)
KETONES, URINE: NEGATIVE MG/DL
LEUKOCYTE ESTERASE, URINE: NEGATIVE
LYMPHOCYTES ABSOLUTE: 0.7 E9/L (ref 1.5–4)
LYMPHOCYTES RELATIVE PERCENT: 8.7 % (ref 20–42)
MCH RBC QN AUTO: 31.9 PG (ref 26–35)
MCHC RBC AUTO-ENTMCNC: 33.1 % (ref 32–34.5)
MCV RBC AUTO: 96.2 FL (ref 80–99.9)
METER GLUCOSE: 109 MG/DL (ref 74–99)
MONOCYTES ABSOLUTE: 0.14 E9/L (ref 0.1–0.95)
MONOCYTES RELATIVE PERCENT: 1.7 % (ref 2–12)
MYELOCYTE PERCENT: 0.9 % (ref 0–0)
NEUTROPHILS ABSOLUTE: 6.09 E9/L (ref 1.8–7.3)
NEUTROPHILS RELATIVE PERCENT: 86.1 % (ref 43–80)
NITRITE, URINE: NEGATIVE
NUCLEATED RED BLOOD CELLS: 0 /100 WBC
PDW BLD-RTO: 13.5 FL (ref 11.5–15)
PH UA: 7 (ref 5–9)
PLATELET # BLD: 161 E9/L (ref 130–450)
PMV BLD AUTO: 9.5 FL (ref 7–12)
POTASSIUM REFLEX MAGNESIUM: 3.9 MMOL/L (ref 3.5–5)
PROMYELOCYTES PERCENT: 0.9 % (ref 0–0)
PROTEIN UA: NEGATIVE MG/DL
RBC # BLD: 3.67 E12/L (ref 3.8–5.8)
RBC # BLD: NORMAL 10*6/UL
SARS-COV-2, NAAT: NOT DETECTED
SODIUM BLD-SCNC: 136 MMOL/L (ref 132–146)
SPECIFIC GRAVITY UA: 1.01 (ref 1–1.03)
TOTAL PROTEIN: 6.4 G/DL (ref 6.4–8.3)
TROPONIN, HIGH SENSITIVITY: 17 NG/L (ref 0–11)
TROPONIN, HIGH SENSITIVITY: 18 NG/L (ref 0–11)
UROBILINOGEN, URINE: 1 E.U./DL
VACUOLATED NEUTROPHILS: ABNORMAL
WBC # BLD: 7 E9/L (ref 4.5–11.5)

## 2021-10-31 PROCEDURE — 84484 ASSAY OF TROPONIN QUANT: CPT

## 2021-10-31 PROCEDURE — 81003 URINALYSIS AUTO W/O SCOPE: CPT

## 2021-10-31 PROCEDURE — 70450 CT HEAD/BRAIN W/O DYE: CPT

## 2021-10-31 PROCEDURE — 2580000003 HC RX 258: Performed by: STUDENT IN AN ORGANIZED HEALTH CARE EDUCATION/TRAINING PROGRAM

## 2021-10-31 PROCEDURE — 82962 GLUCOSE BLOOD TEST: CPT

## 2021-10-31 PROCEDURE — 99284 EMERGENCY DEPT VISIT MOD MDM: CPT

## 2021-10-31 PROCEDURE — 96360 HYDRATION IV INFUSION INIT: CPT

## 2021-10-31 PROCEDURE — 85025 COMPLETE CBC W/AUTO DIFF WBC: CPT

## 2021-10-31 PROCEDURE — 80053 COMPREHEN METABOLIC PANEL: CPT

## 2021-10-31 PROCEDURE — 71045 X-RAY EXAM CHEST 1 VIEW: CPT

## 2021-10-31 PROCEDURE — 51701 INSERT BLADDER CATHETER: CPT

## 2021-10-31 PROCEDURE — 87635 SARS-COV-2 COVID-19 AMP PRB: CPT

## 2021-10-31 PROCEDURE — 93005 ELECTROCARDIOGRAM TRACING: CPT | Performed by: STUDENT IN AN ORGANIZED HEALTH CARE EDUCATION/TRAINING PROGRAM

## 2021-10-31 RX ORDER — 0.9 % SODIUM CHLORIDE 0.9 %
1000 INTRAVENOUS SOLUTION INTRAVENOUS ONCE
Status: COMPLETED | OUTPATIENT
Start: 2021-10-31 | End: 2021-10-31

## 2021-10-31 RX ADMIN — SODIUM CHLORIDE 1000 ML: 9 INJECTION, SOLUTION INTRAVENOUS at 15:25

## 2021-10-31 ASSESSMENT — ENCOUNTER SYMPTOMS
SHORTNESS OF BREATH: 0
NAUSEA: 0
SINUS PRESSURE: 0
EYE REDNESS: 0
WHEEZING: 0
VOMITING: 0
BACK PAIN: 0
COUGH: 0
EYE DISCHARGE: 0
EYE PAIN: 0
SORE THROAT: 0
ABDOMINAL PAIN: 0
DIARRHEA: 0

## 2021-10-31 NOTE — ED PROVIDER NOTES
Patient presents with altered mental status. He is with his daughter. His last known well was last night. His daughter states that today he has seemed more lethargic. She states that he appears to be slowed down. Patient endorses these times but denies any other complaints. Daughter mentions however that he did mention he was short of breath to the nurse. He has had the symptoms previously when he was diagnosed with a UTI. They have been constant since he woke up this morning. He has not done anything to make them better nor worse. Patient is currently denying any fevers, chills, chest pain, cough, nausea, vomiting, abdominal pain, or dysuria. He also denies any confusion, numbness, weakness, or vision changes. He is vaccinated gets Covid. The history is provided by the patient. No  was used. Review of Systems   Constitutional: Positive for fatigue. Negative for chills and fever. HENT: Negative for ear pain, sinus pressure and sore throat. Eyes: Negative for pain, discharge and redness. Respiratory: Negative for cough, shortness of breath and wheezing. Cardiovascular: Negative for chest pain. Gastrointestinal: Negative for abdominal pain, diarrhea, nausea and vomiting. Genitourinary: Negative for dysuria and frequency. Musculoskeletal: Negative for arthralgias and back pain. Skin: Negative for rash and wound. Neurological: Negative for weakness and headaches. Hematological: Negative for adenopathy. All other systems reviewed and are negative. Physical Exam  Vitals and nursing note reviewed. Constitutional:       General: He is not in acute distress. Appearance: He is well-developed. HENT:      Head: Normocephalic and atraumatic. Eyes:      Conjunctiva/sclera: Conjunctivae normal.   Cardiovascular:      Rate and Rhythm: Normal rate and regular rhythm. Heart sounds: Normal heart sounds. No murmur heard.      Pulmonary: Effort: Pulmonary effort is normal. No respiratory distress. Breath sounds: Normal breath sounds. No wheezing or rales. Abdominal:      General: Bowel sounds are normal.      Palpations: Abdomen is soft. Tenderness: There is no abdominal tenderness. There is no guarding or rebound. Musculoskeletal:         General: No tenderness or deformity. Cervical back: Normal range of motion and neck supple. Skin:     General: Skin is warm and dry. Neurological:      Mental Status: He is alert and oriented to person, place, and time. GCS: GCS eye subscore is 4. GCS verbal subscore is 5. GCS motor subscore is 6. Cranial Nerves: No cranial nerve deficit. Coordination: Coordination normal.          Procedures     MDM  Number of Diagnoses or Management Options  Dehydration  Lethargy  Diagnosis management comments: Patient presents with lethargy and altered mental status. His pressures are soft at this time. Fluids were started. Point-of-care glucose was appropriate 109. CBC did not show any leukocytosis but did show patient's baseline anemia at 11.7. Initial troponin was 18. Repeat was. CMP was unremarkable. Covid was negative. CT of the head did not show any acute intracranial abnormalities. Urinalysis is unremarkable and did not show any signs of infection. Chest x-ray did show an opacity in the right lower lobe however compared to previous chest x-ray this appears to be chronic. Patient does not have a leukocytosis as well nor any respiratory complaints. Low likelihood that this is a pneumonia. At this point there is not appear to be any emergent processes ongoing. Patient's vitals are improved and stable at this time. Patient and family member were informed of the results and plan are agreeable. Patient be discharged home with instructions to follow-up with his PCP and keep his previously scheduled appointment with his pulmonologist.Patient discharged home.        Amount and/or Complexity of Data Reviewed  Clinical lab tests: reviewed  Tests in the radiology section of CPT®: reviewed  Tests in the medicine section of CPT®: reviewed  Decide to obtain previous medical records or to obtain history from someone other than the patient: yes         ED Course as of Oct 31 1715   Sun Oct 31, 2021   1713 Patient reevaluated and states that he does feel improved after fluids. His vitals are improved as well with his blood pressure coming up more to his baseline 129/56. [BB]      ED Course User Index  [BB] Jessica Bakrer DO          ED Course as of Oct 31 1715   Steven Bamberger Oct 31, 2021   1713 Patient reevaluated and states that he does feel improved after fluids. His vitals are improved as well with his blood pressure coming up more to his baseline 129/56. [BB]      ED Course User Index  [BB] Jessica Barker DO       --------------------------------------------- PAST HISTORY ---------------------------------------------  Past Medical History:  has a past medical history of Lea's esophagus, Carotid atherosclerosis, History of stress test, [place] (hard of hearing), Hypertension, Raynaud's disease, and Thyroid disease. Past Surgical History:  has a past surgical history that includes Hemorrhoid surgery; eye surgery (Bilateral); Endoscopy, colon, diagnostic; Colonoscopy; Tonsillectomy; Finger amputation; hernia repair (05/20/2018); and Upper gastrointestinal endoscopy (N/A, 1/16/2019). Social History:  reports that he has never smoked. He has never used smokeless tobacco. He reports current alcohol use of about 7.0 standard drinks of alcohol per week. He reports that he does not use drugs. Family History: family history includes Heart Disease in his brother; Other in his father; Stroke in his mother. The patients home medications have been reviewed. Allergies: Patient has no known allergies.     -------------------------------------------------- RESULTS -------------------------------------------------  Labs:  Results for orders placed or performed during the hospital encounter of 10/31/21   COVID-19, Rapid    Specimen: Nasopharyngeal Swab   Result Value Ref Range    SARS-CoV-2, NAAT Not Detected Not Detected   CBC Auto Differential   Result Value Ref Range    WBC 7.0 4.5 - 11.5 E9/L    RBC 3.67 (L) 3.80 - 5.80 E12/L    Hemoglobin 11.7 (L) 12.5 - 16.5 g/dL    Hematocrit 35.3 (L) 37.0 - 54.0 %    MCV 96.2 80.0 - 99.9 fL    MCH 31.9 26.0 - 35.0 pg    MCHC 33.1 32.0 - 34.5 %    RDW 13.5 11.5 - 15.0 fL    Platelets 728 143 - 587 E9/L    MPV 9.5 7.0 - 12.0 fL    Neutrophils % 86.1 (H) 43.0 - 80.0 %    Lymphocytes % 8.7 (L) 20.0 - 42.0 %    Monocytes % 1.7 (L) 2.0 - 12.0 %    Eosinophils % 0.0 0.0 - 6.0 %    Basophils % 0.0 0.0 - 2.0 %    Neutrophils Absolute 6.09 1.80 - 7.30 E9/L    Lymphocytes Absolute 0.70 (L) 1.50 - 4.00 E9/L    Monocytes Absolute 0.14 0.10 - 0.95 E9/L    Eosinophils Absolute 0.00 (L) 0.05 - 0.50 E9/L    Basophils Absolute 0.00 0.00 - 0.20 E9/L    Atypical Lymphocytes Relative 1.7 0.0 - 4.0 %    Myelocyte Percent 0.9 0 - 0 %    Promyelocytes Percent 0.9 0 - 0 %    nRBC 0.0 /100 WBC    Vacuolated Neutrophils 1+     RBC Morphology Normal    Comprehensive Metabolic Panel w/ Reflex to MG   Result Value Ref Range    Sodium 136 132 - 146 mmol/L    Potassium reflex Magnesium 3.9 3.5 - 5.0 mmol/L    Chloride 100 98 - 107 mmol/L    CO2 27 22 - 29 mmol/L    Anion Gap 9 7 - 16 mmol/L    Glucose 113 (H) 74 - 99 mg/dL    BUN 20 6 - 23 mg/dL    CREATININE 0.7 0.7 - 1.2 mg/dL    GFR Non-African American >60 >=60 mL/min/1.73    GFR African American >60     Calcium 9.4 8.6 - 10.2 mg/dL    Total Protein 6.4 6.4 - 8.3 g/dL    Albumin 3.4 (L) 3.5 - 5.2 g/dL    Total Bilirubin 0.5 0.0 - 1.2 mg/dL    Alkaline Phosphatase 75 40 - 129 U/L    ALT 11 0 - 40 U/L    AST 20 0 - 39 U/L   Troponin   Result Value Ref Range    Troponin, High Sensitivity 18 (H) 0 - 11 ng/L Urinalysis, reflex to microscopic   Result Value Ref Range    Color, UA Yellow Straw/Yellow    Clarity, UA Clear Clear    Glucose, Ur Negative Negative mg/dL    Bilirubin Urine Negative Negative    Ketones, Urine Negative Negative mg/dL    Specific Gravity, UA 1.015 1.005 - 1.030    Blood, Urine Negative Negative    pH, UA 7.0 5.0 - 9.0    Protein, UA Negative Negative mg/dL    Urobilinogen, Urine 1.0 <2.0 E.U./dL    Nitrite, Urine Negative Negative    Leukocyte Esterase, Urine Negative Negative   Troponin   Result Value Ref Range    Troponin, High Sensitivity 17 (H) 0 - 11 ng/L   POCT Glucose   Result Value Ref Range    Glucose 109 mg/dL    QC OK? okay    POCT Glucose   Result Value Ref Range    Meter Glucose 109 (H) 74 - 99 mg/dL   EKG 12 Lead   Result Value Ref Range    Ventricular Rate 91 BPM    Atrial Rate 91 BPM    P-R Interval 190 ms    QRS Duration 130 ms    Q-T Interval 376 ms    QTc Calculation (Bazett) 462 ms    P Axis 58 degrees    R Axis -30 degrees    T Axis 114 degrees       Radiology:  CT Head WO Contrast   Final Result   No acute intracranial abnormality. Fluid density in the left frontal, maxillary and ethmoid sinuses. XR CHEST PORTABLE   Final Result   Bilateral pulmonary infiltrates compatible with pneumonia and greatest at the   right lower lobe.             ------------------------- NURSING NOTES AND VITALS REVIEWED ---------------------------  Date / Time Roomed:  10/31/2021  2:51 PM  ED Bed Assignment:  18/18    The nursing notes within the ED encounter and vital signs as below have been reviewed.    BP (!) 129/56   Pulse 90   Temp 98.6 °F (37 °C) (Temporal)   Resp 16   Ht 5' 11\" (1.803 m)   Wt 130 lb (59 kg)   SpO2 95%   BMI 18.13 kg/m²   Oxygen Saturation Interpretation: Normal      ------------------------------------------ PROGRESS NOTES ------------------------------------------  5:15 PM EDT  I have spoken with the patient and discussed todays results, in addition to providing specific details for the plan of care and counseling regarding the diagnosis and prognosis. Their questions are answered at this time and they are agreeable with the plan. I discussed at length with them reasons for immediate return here for re evaluation. They will followup with their primary care physician by calling their office tomorrow. --------------------------------- ADDITIONAL PROVIDER NOTES ---------------------------------  At this time the patient is without objective evidence of an acute process requiring hospitalization or inpatient management. They have remained hemodynamically stable throughout their entire ED visit and are stable for discharge with outpatient follow-up. The plan has been discussed in detail and they are aware of the specific conditions for emergent return, as well as the importance of follow-up. New Prescriptions    No medications on file       Diagnosis:  1. Dehydration    2. Lethargy        Disposition:  Patient's disposition: Discharge to home  Patient's condition is stable.     Patient was seen and evaluated by both myself and Maite Mcknight, [id number], DO  Resident  10/31/21 6741

## 2021-10-31 NOTE — ED NOTES
Pt off floor to CT. Pt states unable to provide urine sample, urinal bottle provided, will notify staff if sample is provided.      Darius Lee RN  10/31/21 9203

## 2021-10-31 NOTE — ED PROVIDER NOTES
FIRST PROVIDER CONTACT ASSESSMENT NOTE           Department of Emergency Medicine                 First Provider Note            10/31/21  2:39 PM EDT    Date of Encounter: No admission date for patient encounter. Patient Name: Jeet Bills  : 1931  MRN: [id number]    Chief Complaint: Altered Mental Status (per family lethargic, Possible UTI)      History of Present Illness:   Jeet Bills is a 80 y.o. male who presents to the ED for fatigue, lethargic. Change in mental status overnight. Mild SOB. Not eating or drinking. Has had all 3 Covid shots. Had UTI in spring. Focused Physical Exam:  VS:    ED Triage Vitals [10/31/21 1437]   BP Temp Temp Source Pulse Resp SpO2 Height Weight   (!) 93/53 98.6 °F (37 °C) Temporal 100 16 96 % 5' 11\" (1.803 m) 130 lb (59 kg)        Physical Ex: Constitutional: Alert and non-toxic. Medical History:  has a past medical history of Lea's esophagus, Carotid atherosclerosis, History of stress test, [place] (hard of hearing), Hypertension, Raynaud's disease, and Thyroid disease. Surgical History:  has a past surgical history that includes Hemorrhoid surgery; eye surgery (Bilateral); Endoscopy, colon, diagnostic; Colonoscopy; Tonsillectomy; Finger amputation; hernia repair (2018); and Upper gastrointestinal endoscopy (N/A, 2019). Social History:  reports that he has never smoked. He has never used smokeless tobacco. He reports current alcohol use of about 1.0 standard drinks of alcohol per week. He reports that he does not use drugs. Family History: family history includes Heart Disease in his brother; Other in his father; Stroke in his mother. Allergies: Patient has no known allergies.      Initial Plan of Care: Initiate Treatment-Testing, Proceed toTreatment Area When Bed Available for ED Attending/MLP to Continue Care      ---END OF FIRST PROVIDER CONTACT ASSESSMENT NOTE---  Electronically signed by Thedore Homans, PA-C   DD: 10/31/21       Paul Mohr PA-C  10/31/21 0222

## 2021-11-01 LAB
EKG ATRIAL RATE: 91 BPM
EKG P AXIS: 58 DEGREES
EKG P-R INTERVAL: 190 MS
EKG Q-T INTERVAL: 376 MS
EKG QRS DURATION: 130 MS
EKG QTC CALCULATION (BAZETT): 462 MS
EKG R AXIS: -30 DEGREES
EKG T AXIS: 114 DEGREES
EKG VENTRICULAR RATE: 91 BPM

## 2021-11-01 PROCEDURE — 93010 ELECTROCARDIOGRAM REPORT: CPT | Performed by: INTERNAL MEDICINE

## 2021-11-04 ENCOUNTER — HOSPITAL ENCOUNTER (OUTPATIENT)
Dept: SPEECH THERAPY | Age: 86
Setting detail: THERAPIES SERIES
Discharge: HOME | End: 2021-11-04
Payer: MEDICARE

## 2021-11-04 PROCEDURE — 92526 ORAL FUNCTION THERAPY: CPT

## 2021-11-04 NOTE — PROGRESS NOTES
Patient was seen for dysphagia therapy. Temperature was taken prior to the session. Temperature was [facility]. COVID-19 screening questionnaire was negative. Mask was worn by SLP. Patient wore a mask when appropriate. Therapy targeted the following: An oral motor exercise program was reviewed with Ilda Cedeño. Each exercise was modeled for the patient. Ilda Cedeño completed fair-good return demonstration of each exercise. Oral apraxia noted. Lingual strength, endurance, and range of motion was rated as fair-good. Labial strength, endurance, and range of motion was rated as poor-fair. Laryngeal strength, endurance, and range of motion was rated as fair. Improved motor planning noted today. Deep pharyngeal neuromuscular stimulation treatment was provided. Patient completed 24 exercises during today's session. Overall strength, endurance, and range of motion was rated as poor-fair. Responses to each exercise will be indicated below.  Bilateral soft palate glide technique resulted in palatal reflex triggering and velopharyngeal closure. Responses were noted 66% of the time.  Triple soft palate glide technique resulted in palatal reflex triggering and velopharyngeal closure. Responses were noted 66% of the time.  Lingual glide technique resulted in laryngeal elevation reflex, tongue base retraction and reflexive lingual groove approximately 0% of the time.  Lateral lingual stimulation resulted in laryngeal elevation reflex, tongue base retraction, and reflexive lingual groove. Responses noted 0% of the time.  Lingual septum stimulation resulted in laryngeal elevation reflex and tongue base retraction. Lingual groove was noted. Response was noted 0% of the time.  Bilateral posterior pharyngeal constrictor stimulation resulted in tongue base retraction, palatal reflex and velopharyngeal closure noted approximately 66% of the time.     Tongue base retraction stimulation resulted in laryngeal elevation reflex, tongue base retraction and reflexive lingual groove approximately 33% of the time.  Uvula stimulation resulted in palatal trigger reflex and velopharyngeal closure. Responses were noted 66% of the time.  The nasal spine stimulation technique resulted in palatal trigger reflex and velopharyngeal closure. Responses were noted 66% of the time.  Lastly, the lateral pharyngeal stimulation technique demonstrated palatal trigger reflex and velopharyngeal closure. Responses were noted 66% of the time. Spontaneous swallow was noted greater than 33% of the time following each probe. Nasal sinus drainage was not present. Increased saliva production was noted. Continue plan of care. Treatment plan and goals can be found in the initial evaluation/progress report. Continue plan of care. Treatment plan and goals can be found in the initial evaluation/progress report. Jeannie Barksdale M.S., KATERIN-SLP/L  Speech-Language Pathologist    CPT CODE:       93645  dysphagia tx

## 2021-11-09 ENCOUNTER — HOSPITAL ENCOUNTER (OUTPATIENT)
Dept: SPEECH THERAPY | Age: 86
Setting detail: THERAPIES SERIES
Discharge: HOME | End: 2021-11-09
Payer: MEDICARE

## 2021-11-09 PROCEDURE — 92526 ORAL FUNCTION THERAPY: CPT

## 2021-11-09 NOTE — PROGRESS NOTES
Patient was seen for dysphagia therapy. Temperature was taken prior to the session. Temperature was [facility]. COVID-19 screening questionnaire was negative. Mask was worn by SLP. Patient wore a mask when appropriate. Patient's daughter accompanied him to therapy. Therapy targeted the following:    Deep pharyngeal neuromuscular stimulation treatment was provided. Patient completed 75 exercises during today's session. Overall strength, endurance, and range of motion was rated as poor-fair. Responses to each exercise will be indicated below.  Bilateral soft palate glide technique resulted in palatal reflex triggering and velopharyngeal closure. Responses were noted 66% of the time.  Triple soft palate glide technique resulted in palatal reflex triggering and velopharyngeal closure. Responses were noted 66% of the time.  Lingual glide technique resulted in laryngeal elevation reflex, tongue base retraction and reflexive lingual groove approximately 33% of the time.  Lateral lingual stimulation resulted in laryngeal elevation reflex, tongue base retraction, and reflexive lingual groove. Responses noted 33% of the time.  Lingual septum stimulation resulted in laryngeal elevation reflex and tongue base retraction. Lingual groove was noted. Response was noted 0% of the time.  Bilateral posterior pharyngeal constrictor stimulation resulted in tongue base retraction, palatal reflex and velopharyngeal closure noted approximately 100% of the time.  Tongue base retraction stimulation resulted in laryngeal elevation reflex, tongue base retraction and reflexive lingual groove approximately 33% of the time.  Uvula stimulation resulted in palatal trigger reflex and velopharyngeal closure. Responses were noted 66% of the time.  The nasal spine stimulation technique resulted in palatal trigger reflex and velopharyngeal closure. Responses were noted 66% of the time.    Lastly, the lateral pharyngeal stimulation technique demonstrated palatal trigger reflex and velopharyngeal closure. Responses were noted 66% of the time. Spontaneous swallow was noted greater than 33% of the time following each probe. Nasal sinus drainage was present. Increased saliva production was noted. Tongue base retraction exercises were completed. Patient demonstrated poor-fair strength, endurance, and range of motion. Continue plan of care. Treatment plan and goals can be found in the initial evaluation/progress report. Jeannie Roque M.S., KATERIN-SLP/L  Speech-Language Pathologist    CPT CODE:       [id number]  dysphagia tx

## 2021-11-11 ENCOUNTER — HOSPITAL ENCOUNTER (OUTPATIENT)
Dept: SPEECH THERAPY | Age: 86
Setting detail: THERAPIES SERIES
Discharge: HOME | End: 2021-11-11
Payer: MEDICARE

## 2021-11-11 PROCEDURE — 92526 ORAL FUNCTION THERAPY: CPT

## 2021-11-11 NOTE — PROGRESS NOTES
Patient was seen for dysphagia therapy. Temperature was taken prior to the session. Temperature was [facility]. COVID-19 screening questionnaire was negative. Mask was worn by SLP. Patient wore a mask when appropriate. Therapy targeted the following:    Deep pharyngeal neuromuscular stimulation treatment was provided. Patient completed 75 exercises during today's session. Overall strength, endurance, and range of motion was rated as poor-fair. Responses to each exercise will be indicated below.  Bilateral soft palate glide technique resulted in palatal reflex triggering and velopharyngeal closure. Responses were noted 66% of the time.  Triple soft palate glide technique resulted in palatal reflex triggering and velopharyngeal closure. Responses were noted 66% of the time.  Lingual glide technique resulted in laryngeal elevation reflex, tongue base retraction and reflexive lingual groove approximately 33% of the time.  Lateral lingual stimulation resulted in laryngeal elevation reflex, tongue base retraction, and reflexive lingual groove. Responses noted 33% of the time.  Lingual septum stimulation resulted in laryngeal elevation reflex and tongue base retraction. Lingual groove was noted. Response was noted 0% of the time.  Bilateral posterior pharyngeal constrictor stimulation resulted in tongue base retraction, palatal reflex and velopharyngeal closure noted approximately 100% of the time.  Tongue base retraction stimulation resulted in laryngeal elevation reflex, tongue base retraction and reflexive lingual groove approximately 33% of the time.  Uvula stimulation resulted in palatal trigger reflex and velopharyngeal closure. Responses were noted 66% of the time.  The nasal spine stimulation technique resulted in palatal trigger reflex and velopharyngeal closure. Responses were noted 66% of the time.    Lastly, the lateral pharyngeal stimulation technique demonstrated palatal trigger reflex and velopharyngeal closure. Responses were noted 66% of the time. Spontaneous swallow was noted greater than 33% of the time following each probe. Nasal sinus drainage was present. Increased saliva production was noted. Tongue base retraction exercises were completed. Patient demonstrated poor-fair strength, endurance, and range of motion. Patient completed one Daniela exercise today with fair laryngeal lifting and excursion. Tongue strengthening exercises were also completed with fair strength, endurance, and range of motion. Continue plan of care. Treatment plan and goals can be found in the initial evaluation/progress report. Jeannie Caldwell M.S., KATERIN-SLP/L  Speech-Language Pathologist    CPT CODE:       36830  dysphagia tx

## 2021-11-23 ENCOUNTER — HOSPITAL ENCOUNTER (OUTPATIENT)
Dept: SPEECH THERAPY | Age: 86
Setting detail: THERAPIES SERIES
Discharge: HOME | End: 2021-11-23
Payer: MEDICARE

## 2021-11-23 PROCEDURE — 92526 ORAL FUNCTION THERAPY: CPT

## 2021-11-23 NOTE — PROGRESS NOTES
Patient was seen for dysphagia therapy. Temperature was taken prior to the session. Temperature was [facility]. COVID-19 screening questionnaire was negative. Mask was worn by SLP. Patient wore a mask when appropriate. Therapy targeted the following:    Deep pharyngeal neuromuscular stimulation treatment was provided. Patient completed 75 exercises during today's session. Overall strength, endurance, and range of motion was rated as poor-fair. Responses to each exercise will be indicated below.  Bilateral soft palate glide technique resulted in palatal reflex triggering and velopharyngeal closure. Responses were noted 66% of the time.  Triple soft palate glide technique resulted in palatal reflex triggering and velopharyngeal closure. Responses were noted 66% of the time.  Lingual glide technique resulted in laryngeal elevation reflex, tongue base retraction and reflexive lingual groove approximately 33% of the time.  Lateral lingual stimulation resulted in laryngeal elevation reflex, tongue base retraction, and reflexive lingual groove. Responses noted 33% of the time.  Lingual septum stimulation resulted in laryngeal elevation reflex and tongue base retraction. Lingual groove was noted. Response was noted 0% of the time.  Bilateral posterior pharyngeal constrictor stimulation resulted in tongue base retraction, palatal reflex and velopharyngeal closure noted approximately 66% of the time.  Tongue base retraction stimulation resulted in laryngeal elevation reflex, tongue base retraction and reflexive lingual groove approximately 33% of the time.  Uvula stimulation resulted in palatal trigger reflex and velopharyngeal closure. Responses were noted 66% of the time.  The nasal spine stimulation technique resulted in palatal trigger reflex and velopharyngeal closure. Responses were noted 66% of the time.    Lastly, the lateral pharyngeal stimulation technique demonstrated palatal trigger reflex and velopharyngeal closure. Responses were noted 66% of the time. Spontaneous swallow was noted greater than 33% of the time following each probe. Nasal sinus drainage was present. Increased saliva production was noted. Tongue base retraction exercises were completed. Patient demonstrated poor-fair strength, endurance, and range of motion. Continue plan of care. Treatment plan and goals can be found in the initial evaluation/progress report. Jeannie Pederson M.S., CCC-SLP/L  Speech-Language Pathologist    CPT CODE:       [id number]  dysphagia tx

## 2021-11-30 ENCOUNTER — HOSPITAL ENCOUNTER (OUTPATIENT)
Dept: SPEECH THERAPY | Age: 86
Setting detail: THERAPIES SERIES
Discharge: HOME | End: 2021-11-30
Payer: MEDICARE

## 2021-11-30 PROCEDURE — 92526 ORAL FUNCTION THERAPY: CPT

## 2021-11-30 NOTE — PROGRESS NOTES
Patient was seen for dysphagia therapy. Temperature was taken prior to the session. Temperature was [facility]. COVID-19 screening questionnaire was negative. Mask was worn by SLP. Patient wore a mask when appropriate. Therapy targeted the following:    Deep pharyngeal neuromuscular stimulation treatment was provided. Patient completed 75 exercises during today's session. Overall strength, endurance, and range of motion was rated as fair. Responses to each exercise will be indicated below.  Bilateral soft palate glide technique resulted in palatal reflex triggering and velopharyngeal closure. Responses were noted % of the time.  Triple soft palate glide technique resulted in palatal reflex triggering and velopharyngeal closure. Responses were noted % of the time.  Lingual glide technique resulted in laryngeal elevation reflex, tongue base retraction and reflexive lingual groove approximately 33% of the time.  Lateral lingual stimulation resulted in laryngeal elevation reflex, tongue base retraction, and reflexive lingual groove. Responses noted 33% of the time.  Lingual septum stimulation resulted in laryngeal elevation reflex and tongue base retraction. Lingual groove was noted. Response was noted 0% of the time.  Bilateral posterior pharyngeal constrictor stimulation resulted in tongue base retraction, palatal reflex and velopharyngeal closure noted approximately % of the time.  Tongue base retraction stimulation resulted in laryngeal elevation reflex, tongue base retraction and reflexive lingual groove approximately 33% of the time.  Uvula stimulation resulted in palatal trigger reflex and velopharyngeal closure. Responses were noted 66% of the time.  The nasal spine stimulation technique resulted in palatal trigger reflex and velopharyngeal closure. Responses were noted 66% of the time.    Lastly, the lateral pharyngeal stimulation technique demonstrated palatal trigger reflex and velopharyngeal closure. Responses were noted 66% of the time. Spontaneous swallow was noted greater than 33% of the time following each probe. Nasal sinus drainage was present. Increased saliva production was noted. Tongue base retraction exercises were completed. Patient demonstrated poor-fair strength, endurance, and range of motion. Tongue strengthening exercises were completed with good strength, endurance, and range of motion noted. Patient completed 2 Daniela exercises with good laryngeal lifting and excursion. Continue plan of care. Treatment plan and goals can be found in the initial evaluation/progress report. Jeannie Snell M.S., KATERIN-SLP/L  Speech-Language Pathologist    CPT CODE:       38173  dysphagia tx

## 2021-12-02 ENCOUNTER — HOSPITAL ENCOUNTER (OUTPATIENT)
Dept: SPEECH THERAPY | Age: 86
Setting detail: THERAPIES SERIES
Discharge: HOME | End: 2021-12-02
Payer: MEDICARE

## 2021-12-02 PROCEDURE — 92526 ORAL FUNCTION THERAPY: CPT

## 2021-12-02 NOTE — PROGRESS NOTES
Patient was seen for dysphagia therapy. Temperature was taken prior to the session. Temperature was [facility]. COVID-19 screening questionnaire was negative. Mask was worn by SLP. Patient wore a mask when appropriate. Therapy targeted the following:    Deep pharyngeal neuromuscular stimulation treatment was provided. Patient completed 75 exercises during today's session. Overall strength, endurance, and range of motion was rated as fair. Responses to each exercise will be indicated below.  Bilateral soft palate glide technique resulted in palatal reflex triggering and velopharyngeal closure. Responses were noted 100% of the time.  Triple soft palate glide technique resulted in palatal reflex triggering and velopharyngeal closure. Responses were noted 100% of the time.  Lingual glide technique resulted in laryngeal elevation reflex, tongue base retraction and reflexive lingual groove approximately 33% of the time.  Lateral lingual stimulation resulted in laryngeal elevation reflex, tongue base retraction, and reflexive lingual groove. Responses noted 33% of the time.  Lingual septum stimulation resulted in laryngeal elevation reflex and tongue base retraction. Lingual groove was noted. Response was noted 0% of the time.  Bilateral posterior pharyngeal constrictor stimulation resulted in tongue base retraction, palatal reflex and velopharyngeal closure noted approximately % of the time.  Tongue base retraction stimulation resulted in laryngeal elevation reflex, tongue base retraction and reflexive lingual groove approximately 33% of the time.  Uvula stimulation resulted in palatal trigger reflex and velopharyngeal closure. Responses were noted 66% of the time.  The nasal spine stimulation technique resulted in palatal trigger reflex and velopharyngeal closure. Responses were noted 66% of the time.    Lastly, the lateral pharyngeal stimulation technique demonstrated palatal trigger reflex and velopharyngeal closure. Responses were noted 66% of the time. Spontaneous swallow was noted greater than 33% of the time following each probe. Nasal sinus drainage was present. Increased saliva production was noted. Tongue base retraction exercises were completed. Patient demonstrated poor-fair strength, endurance, and range of motion. Tongue strengthening exercises were completed with good strength, endurance, and range of motion noted. Patient completed 2 Daniela exercises with good laryngeal lifting and excursion. Patient reported less coughing during meals. He is consistently thickening his liquids. SLP will contact insurance to request more visits. Continue plan of care. Treatment plan and goals can be found in the initial evaluation/progress report. Jeannie Mathis M.S., CCC-SLP/L  Speech-Language Pathologist    CPT CODE:       [id number]  dysphagia tx Color consistent with ethnicity/race, warm, dry intact, resilient. Laceration to left lateral upper lip

## 2021-12-07 ENCOUNTER — HOSPITAL ENCOUNTER (OUTPATIENT)
Dept: SPEECH THERAPY | Age: 86
Setting detail: THERAPIES SERIES
Discharge: HOME | End: 2021-12-07
Payer: MEDICARE

## 2021-12-07 PROCEDURE — 92526 ORAL FUNCTION THERAPY: CPT

## 2021-12-07 NOTE — PROGRESS NOTES
reflex and velopharyngeal closure. Responses were noted 66% of the time. Spontaneous swallow was noted greater than 33% of the time following each probe. Nasal sinus drainage was present. Increased saliva production was noted. Tongue base retraction exercises were completed. Patient demonstrated poor-fair strength, endurance, and range of motion. Improved range of motion noted. Continue plan of care. Treatment plan and goals can be found in the initial evaluation/progress report. Jeannie Davis M.S., KATERIN-SLP/L  Speech-Language Pathologist    CPT CODE:       [id number]  dysphagia tx

## 2021-12-09 ENCOUNTER — HOSPITAL ENCOUNTER (OUTPATIENT)
Dept: SPEECH THERAPY | Age: 86
Setting detail: THERAPIES SERIES
Discharge: HOME | End: 2021-12-09
Payer: MEDICARE

## 2021-12-09 PROCEDURE — 92526 ORAL FUNCTION THERAPY: CPT

## 2021-12-09 NOTE — PROGRESS NOTES
Patient was seen for dysphagia therapy. Temperature was taken prior to the session. Temperature was [facility]. COVID-19 screening questionnaire was negative. Mask was worn by SLP. Patient wore a mask when appropriate. Therapy targeted the following:    Deep pharyngeal neuromuscular stimulation treatment was provided. Patient completed 75 exercises during today's session. Overall strength, endurance, and range of motion was rated as fair. Responses to each exercise will be indicated below.  Bilateral soft palate glide technique resulted in palatal reflex triggering and velopharyngeal closure. Responses were noted 66% of the time.  Triple soft palate glide technique resulted in palatal reflex triggering and velopharyngeal closure. Responses were noted 66% of the time.  Lingual glide technique resulted in laryngeal elevation reflex, tongue base retraction and reflexive lingual groove approximately 0-33% of the time.  Lateral lingual stimulation resulted in laryngeal elevation reflex, tongue base retraction, and reflexive lingual groove. Responses noted 33% of the time.  Lingual septum stimulation resulted in laryngeal elevation reflex and tongue base retraction. Lingual groove was noted. Response was noted 0% of the time.  Bilateral posterior pharyngeal constrictor stimulation resulted in tongue base retraction, palatal reflex and velopharyngeal closure noted approximately 100% of the time.  Tongue base retraction stimulation resulted in laryngeal elevation reflex, tongue base retraction and reflexive lingual groove approximately 33% of the time.  Uvula stimulation resulted in palatal trigger reflex and velopharyngeal closure. Responses were noted 66% of the time.  The nasal spine stimulation technique resulted in palatal trigger reflex and velopharyngeal closure. Responses were noted 66% of the time.    Lastly, the lateral pharyngeal stimulation technique demonstrated palatal trigger reflex and velopharyngeal closure. Responses were noted 100% of the time. Spontaneous swallow was noted greater than 33% of the time following each probe. Nasal sinus drainage was present. Increased saliva production was noted. Tongue base retraction exercises were completed. Patient demonstrated poor-fair strength, endurance, and range of motion. Tongue strengthening was targeted with good strength and endurance. Lastly, patient completed 2 Daniela exercises with good laryngeal lifting and excursion. Continue plan of care. Treatment plan and goals can be found in the initial evaluation/progress report. Jeannie Jewell M.S., KATERIN-SLP/L  Speech-Language Pathologist    CPT CODE:       [id number]  dysphagia tx

## 2021-12-14 ENCOUNTER — HOSPITAL ENCOUNTER (OUTPATIENT)
Dept: SPEECH THERAPY | Age: 86
Setting detail: THERAPIES SERIES
Discharge: HOME | End: 2021-12-14
Payer: MEDICARE

## 2021-12-14 PROCEDURE — 92526 ORAL FUNCTION THERAPY: CPT

## 2021-12-14 NOTE — PROGRESS NOTES
Patient was seen for dysphagia therapy. Temperature was taken prior to the session. Temperature was [facility]. COVID-19 screening questionnaire was negative. Mask was worn by SLP. Patient wore a mask when appropriate. Therapy targeted the following:    Deep pharyngeal neuromuscular stimulation treatment was provided. Patient completed 75 exercises during today's session. Overall strength, endurance, and range of motion was rated as fair. Responses to each exercise will be indicated below.  Bilateral soft palate glide technique resulted in palatal reflex triggering and velopharyngeal closure. Responses were noted 100% of the time.  Triple soft palate glide technique resulted in palatal reflex triggering and velopharyngeal closure. Responses were noted 100% of the time.  Lingual glide technique resulted in laryngeal elevation reflex, tongue base retraction and reflexive lingual groove approximately 0-33% of the time.  Lateral lingual stimulation resulted in laryngeal elevation reflex, tongue base retraction, and reflexive lingual groove. Responses noted 33% of the time.  Lingual septum stimulation resulted in laryngeal elevation reflex and tongue base retraction. Lingual groove was noted. Response was noted 0% of the time.  Bilateral posterior pharyngeal constrictor stimulation resulted in tongue base retraction, palatal reflex and velopharyngeal closure noted approximately 100% of the time.  Tongue base retraction stimulation resulted in laryngeal elevation reflex, tongue base retraction and reflexive lingual groove approximately 33% of the time.  Uvula stimulation resulted in palatal trigger reflex and velopharyngeal closure. Responses were noted 100% of the time.  The nasal spine stimulation technique resulted in palatal trigger reflex and velopharyngeal closure. Responses were noted 100% of the time.    Lastly, the lateral pharyngeal stimulation technique demonstrated palatal trigger reflex and velopharyngeal closure. Responses were noted 100% of the time. Spontaneous swallow was noted greater than 33% of the time following each probe. Nasal sinus drainage was present. Increased saliva production was noted. Tongue base retraction exercises were completed. Patient demonstrated poor-fair strength, endurance, and range of motion. Continue plan of care. Treatment plan and goals can be found in the initial evaluation/progress report. Jeannie Johnson M.S., CCC-SLP/L  Speech-Language Pathologist    CPT CODE:       [id number]  dysphagia tx

## 2021-12-16 ENCOUNTER — HOSPITAL ENCOUNTER (OUTPATIENT)
Dept: SPEECH THERAPY | Age: 86
Setting detail: THERAPIES SERIES
Discharge: HOME | End: 2021-12-16
Payer: MEDICARE

## 2021-12-16 PROCEDURE — 92526 ORAL FUNCTION THERAPY: CPT

## 2021-12-16 NOTE — PROGRESS NOTES
Patient was seen for dysphagia therapy. Temperature was taken prior to the session. Temperature was [facility]. COVID-19 screening questionnaire was negative. Mask was worn by SLP. Patient wore a mask when appropriate. Therapy targeted the following:    Deep pharyngeal neuromuscular stimulation treatment was provided. Patient completed 75 exercises during today's session. Overall strength, endurance, and range of motion was rated as fair. Responses to each exercise will be indicated below.  Bilateral soft palate glide technique resulted in palatal reflex triggering and velopharyngeal closure. Responses were noted 100% of the time.  Triple soft palate glide technique resulted in palatal reflex triggering and velopharyngeal closure. Responses were noted 100% of the time.  Lingual glide technique resulted in laryngeal elevation reflex, tongue base retraction and reflexive lingual groove approximately 0-33% of the time.  Lateral lingual stimulation resulted in laryngeal elevation reflex, tongue base retraction, and reflexive lingual groove. Responses noted 33% of the time.  Lingual septum stimulation resulted in laryngeal elevation reflex and tongue base retraction. Lingual groove was noted. Response was noted 0% of the time.  Bilateral posterior pharyngeal constrictor stimulation resulted in tongue base retraction, palatal reflex and velopharyngeal closure noted approximately 100% of the time.  Tongue base retraction stimulation resulted in laryngeal elevation reflex, tongue base retraction and reflexive lingual groove approximately 33% of the time.  Uvula stimulation resulted in palatal trigger reflex and velopharyngeal closure. Responses were noted 100% of the time.  The nasal spine stimulation technique resulted in palatal trigger reflex and velopharyngeal closure. Responses were noted 100% of the time.    Lastly, the lateral pharyngeal stimulation technique demonstrated palatal trigger

## 2021-12-21 ENCOUNTER — HOSPITAL ENCOUNTER (OUTPATIENT)
Dept: SPEECH THERAPY | Age: 86
Setting detail: THERAPIES SERIES
Discharge: HOME | End: 2021-12-21
Payer: MEDICARE

## 2021-12-21 PROCEDURE — 92526 ORAL FUNCTION THERAPY: CPT

## 2021-12-21 NOTE — PROGRESS NOTES
reflex and velopharyngeal closure. Responses were noted 100% of the time. Spontaneous swallow was noted greater than 33% of the time following each probe. Nasal sinus drainage was present. Increased saliva production was noted. Tongue base retraction exercises were completed. Patient demonstrated poor-fair strength, endurance, and range of motion. Improved motor planning noted. Continue plan of care. Treatment plan and goals can be found in the initial evaluation/progress report. Jeannie Giron M.S., CCC-SLP/L  Speech-Language Pathologist    CPT CODE:       [id number]  dysphagia tx

## 2021-12-23 ENCOUNTER — HOSPITAL ENCOUNTER (OUTPATIENT)
Dept: SPEECH THERAPY | Age: 86
Setting detail: THERAPIES SERIES
Discharge: HOME | End: 2021-12-23
Payer: MEDICARE

## 2021-12-23 PROCEDURE — 92526 ORAL FUNCTION THERAPY: CPT

## 2021-12-23 NOTE — PROGRESS NOTES
Patient was seen for dysphagia therapy. Temperature was taken prior to the session. Temperature was [facility]. COVID-19 screening questionnaire was negative. Mask was worn by SLP. Patient wore a mask when appropriate. Therapy targeted the following:    Deep pharyngeal neuromuscular stimulation treatment was provided. Patient completed 75 exercises during today's session. Overall strength, endurance, and range of motion was rated as fair. Responses to each exercise will be indicated below.  Bilateral soft palate glide technique resulted in palatal reflex triggering and velopharyngeal closure. Responses were noted 100% of the time.  Triple soft palate glide technique resulted in palatal reflex triggering and velopharyngeal closure. Responses were noted 100% of the time.  Lingual glide technique resulted in laryngeal elevation reflex, tongue base retraction and reflexive lingual groove approximately 33% of the time.  Lateral lingual stimulation resulted in laryngeal elevation reflex, tongue base retraction, and reflexive lingual groove. Responses noted 33% of the time.  Lingual septum stimulation resulted in laryngeal elevation reflex and tongue base retraction. Lingual groove was noted. Response was noted 0-33% of the time.  Bilateral posterior pharyngeal constrictor stimulation resulted in tongue base retraction, palatal reflex and velopharyngeal closure noted approximately 100% of the time.  Tongue base retraction stimulation resulted in laryngeal elevation reflex, tongue base retraction and reflexive lingual groove approximately 33% of the time.  Uvula stimulation resulted in palatal trigger reflex and velopharyngeal closure. Responses were noted 100% of the time.  The nasal spine stimulation technique resulted in palatal trigger reflex and velopharyngeal closure. Responses were noted 100% of the time.    Lastly, the lateral pharyngeal stimulation technique demonstrated palatal trigger reflex and velopharyngeal closure. Responses were noted 100% of the time. Spontaneous swallow was noted greater than 33% of the time following each probe. Nasal sinus drainage was present. Increased saliva production was noted. Tongue base retraction exercises were completed. Patient demonstrated poor-fair strength, endurance, and range of motion. Improved motor planning noted. Patient completed 3 Daniela exercises with good laryngeal lifting and excursion. Continue plan of care. Treatment plan and goals can be found in the initial evaluation/progress report. Jeannie Goodman M.S., CCC-SLP/L  Speech-Language Pathologist    CPT CODE:       [id number]  dysphagia tx

## 2021-12-28 ENCOUNTER — APPOINTMENT (OUTPATIENT)
Dept: SPEECH THERAPY | Age: 86
End: 2021-12-28
Payer: MEDICARE

## 2021-12-30 ENCOUNTER — APPOINTMENT (OUTPATIENT)
Dept: SPEECH THERAPY | Age: 86
End: 2021-12-30
Payer: MEDICARE

## 2022-01-04 ENCOUNTER — HOSPITAL ENCOUNTER (OUTPATIENT)
Dept: SPEECH THERAPY | Age: 87
Setting detail: THERAPIES SERIES
Discharge: HOME | End: 2022-01-04
Payer: MEDICARE

## 2022-01-04 PROCEDURE — 92526 ORAL FUNCTION THERAPY: CPT

## 2022-01-04 NOTE — PROGRESS NOTES
Patient was seen for dysphagia therapy. Temperature was taken prior to the session. Temperature was [facility]. COVID-19 screening questionnaire was negative. Mask and goggles were worn by SLP. Patient wore a mask when appropriate. Therapy targeted the following:    Deep pharyngeal neuromuscular stimulation treatment was provided. Patient completed 75 exercises during today's session. Overall strength, endurance, and range of motion was rated as fair. Responses to each exercise will be indicated below.  Bilateral soft palate glide technique resulted in palatal reflex triggering and velopharyngeal closure. Responses were noted 100% of the time.  Triple soft palate glide technique resulted in palatal reflex triggering and velopharyngeal closure. Responses were noted 100% of the time.  Lingual glide technique resulted in laryngeal elevation reflex, tongue base retraction and reflexive lingual groove approximately 66% of the time.  Lateral lingual stimulation resulted in laryngeal elevation reflex, tongue base retraction, and reflexive lingual groove. Responses noted 66% of the time.  Lingual septum stimulation resulted in laryngeal elevation reflex and tongue base retraction. Lingual groove was noted. Response was noted 33% of the time.  Bilateral posterior pharyngeal constrictor stimulation resulted in tongue base retraction, palatal reflex and velopharyngeal closure noted approximately 100% of the time.  Tongue base retraction stimulation resulted in laryngeal elevation reflex, tongue base retraction and reflexive lingual groove approximately 33% of the time.  Uvula stimulation resulted in palatal trigger reflex and velopharyngeal closure. Responses were noted 100% of the time.  The nasal spine stimulation technique resulted in palatal trigger reflex and velopharyngeal closure. Responses were noted 100% of the time.    Lastly, the lateral pharyngeal stimulation technique demonstrated palatal trigger reflex and velopharyngeal closure. Responses were noted 100% of the time. Spontaneous swallow was noted greater than 33% of the time following each probe. Nasal sinus drainage was present. Increased saliva production was noted. Tongue base retraction exercises were completed. Patient demonstrated poor-fair strength, endurance, and range of motion. Improved motor planning noted. Patient completed 3 Daniela exercises with good laryngeal lifting and excursion. Thin liquid water trials were provided. Patient demonstrated good laryngeal lifting and excursion. Minimal-no pharyngeal delay noted. Coughing noted on two swallows post presentation. Patient was cued for compensatory strategies. SLP recommends repeat MBSS. SLP will request a prescription from the referring physician for repeat MBSS to determine if diet advancement is appropriate and in order to update goals for continued therapy if needed. Continue plan of care. Treatment plan and goals can be found in the initial evaluation/progress report. Jeannie Plata M.S., CCC-SLP/L  Speech-Language Pathologist    CPT CODE:       [id number]  dysphagia tx

## 2022-01-21 ENCOUNTER — HOSPITAL ENCOUNTER (OUTPATIENT)
Dept: GENERAL RADIOLOGY | Age: 87
Discharge: HOME | End: 2022-01-23
Payer: MEDICARE

## 2022-01-21 DIAGNOSIS — R13.19 ESOPHAGEAL DYSPHAGIA: ICD-10-CM

## 2022-01-21 PROCEDURE — 74230 X-RAY XM SWLNG FUNCJ C+: CPT

## 2022-01-21 PROCEDURE — 92611 MOTION FLUOROSCOPY/SWALLOW: CPT | Performed by: SPEECH-LANGUAGE PATHOLOGIST

## 2022-01-21 PROCEDURE — 92526 ORAL FUNCTION THERAPY: CPT | Performed by: SPEECH-LANGUAGE PATHOLOGIST

## 2022-01-21 NOTE — PROGRESS NOTES
SPEECH/LANGUAGE PATHOLOGY  VIDEOFLUOROSCOPIC STUDY OF SWALLOWING (MBS)   and PLAN OF CARE    PATIENT NAME:  Henri Epps  (male)     MRN:  [id number]    :  1931  (80 y.o.)  STATUS:  Inpatient: Room Room/bed info not found    TODAY'S DATE:  2022  REFERRING PROVIDER:   Enedina Romo MD   SPECIFIC PROVIDER ORDER: FL modified barium swallow with video  Date of order:  22   REASON FOR REFERRAL: re-assess oropharyngeal swallow function   EVALUATING THERAPIST: MARINA Moscoso      RESULTS:      DYSPHAGIA DIAGNOSIS:  mild-moderate oral phase dysphagia  and moderate-severe pharyngeal phase dysphagia     DIET RECOMMENDATIONS:  Soft and bite size consistency solids (IDDSI level 6) with  honey consistency (moderately thick - IDDSI level 3)  Liquids    Administer medication crushed, as able, with pudding/applesauce    Pt may benefit from a neurology consult secondary to ongoing dysphagia and hypernasal speech quality at times     FEEDING RECOMMENDATIONS:    Assistance level:  Stand by assistance is needed during all oral intake     Compensatory strategies recommended: Multiple swallow, Effortful swallow, Small bites/sips, Alternate solids and liquids, Throat clear and Cough post swallow and No straw     Discussed recommendations with nursing and/or faxed report to referring provider: Yes    Laryngeal Penetration and Aspiration:  Penetration WITHOUT aspiration was observed in today's study with  moderately thick liquid (honey)-- cleared with cued throat clear/cough  Penetration WITH aspiration was observed in today's study with  mildly thick liquid (nectar)-- after the swallow    SPEECH THERAPY  PLAN OF CARE   The dysphagia POC is established based on physician order and dysphagia diagnosis    Outpatient OR Home Care Skilled SLP intervention for dysphagia management is recommended 1-2 times per week to address the established treatment plan      Conditions Requiring Skilled Therapeutic Intervention for dysphagia:    Oral motor strength/coordination impairment  Reduced pharyngeal clearing of the bolus  Reduced laryngeal closure resulting in penetration  Swallow triggered when bolus head at level of laryngeal surface of epiglottis increasing risk of aspiration    SPECIFIC DYSPHAGIA INTERVENTIONS TO INCLUDE:     Compensatory strategy training   Therapeutic exercises  Therapeutic intervention to facilitate implementation of energy conservation techniques during intake to decrease potential for aspiration associated with compromised swallow/breathing sequence. Deep Pharyngeal Neuromuscular Stimulation (DPNS) if available   Neuromuscular Electrical Stimulation (NMES) if available     Specific instructions for next treatment:  initiate instruction of therapeutic exercises  and initiate instruction of compensatory strategies  Treatment Goals:    Short Term Goals:  Pt will implement identified compensatory swallowing strategies on 90% of opportunities or greater to improve airway protection and swallow function. Pt will complete oral motor strength/ coordination exercises to improve bolus prep/ control and mastication with  moderate verbal prompts . Pt will complete BOTR strength/ ROM exercises to reduce pharyngeal residuals and improve epiglottic inversion moderate verbal prompts  Pt will complete laryngeal strength/ ROM therapeutic exercises to improve airway protection for the least restrictive PO diet moderate verbal prompts  Pt will complete Shaker and/or Chin Tuck Against Resistance (CTAR) to increase UES relaxation diameter and increase anterior laryngeal excursion to reduce pharyngeal residuals and reduce risk of pen/asp with moderate verbal prompts.    Pt will complete Effortful Swallow therapeutically to target increased oral and base of tongue pressure, increased pharyngeal constrictor contractions, and increased UES relaxation duration to reduce pharyngeal residue with moderate verbal prompts   Pt will triggered at the level of the vallecula        PHARYNGEAL RESIDUALS        Vallecula/Pharyngeal Wall           Reduced tongue base retraction and impaired epiglottic inversion resulting in residuals in vallecula and/or posterior pharyngeal wall for all consistencies administered which partially cleared  with cued multiple swallow       Pyriform Sinuses      Residuals in the pyriform sinuses were noted due to pharyngeal weakness for all consistencies administered  which  partially cleared  with spontaneous multiple swallow      LARYNGEAL PENETRATION   Laryngeal penetration occurred prior to aspiration. Further details under aspiration section. Laryngeal penetration occurred in the absence of aspiration DURING the swallow for nectar consistency liquid and honey thick liquids due to  inadequate laryngeal closure which remained in the laryngeal vestibule. . Laryngeal penetration was mild-moderate and occurred consistently   an absent cough/throat clear was noted    Laryngeal penetration occurred in the absence of aspiration AFTER the swallow for nectar consistency liquid due to pharyngeal residuals which remained in the laryngeal vestibule. and penetrated deep into the laryngeal vestibule (to the level of the true vocal folds). Laryngeal penetration was mild-moderate and occurred consistently. an absent cough/throat clear was noted    ASPIRATION  Aspiration occurred AFTER the swallow for nectar consistency liquid due to pharyngeal residuals and residuals in the laryngeal vestibule . Aspiration was moderate and occurred inconsistently .   an absent cough/throat clear was noted    PENETRATION-ASPIRATION SCALE (PAS):  THIN item not administered  MILDLY THICK 8 = Material enters the airway, passes below the vocal folds, and no effort is made to eject   MODERATELY THICK 2 = Material enters the airway, remains above vocal folds, and is ejected from the airway   PUREE 1 = Material does not enter the airway  HARD SOLID 1 = Material does not enter the airway       COMPENSATORY STRATEGIES    Compensatory strategies that were beneficial included Multiple swallow, Effortful swallow, Small bites/sips, Alternate solids and liquids, Throat clear and Cough post swallow and No straw      STRUCTURAL/FUNCTIONAL ANOMALIES   No structural/functional anomalies were noted    CERVICAL ESOPHAGEAL STAGE :     The cervical esophagus appeared adequate          ___________    Cognition:   Within functional limits for this exam    Oral Peripheral Examination   Generalized oral weakness    Current Respiratory Status   room air     Parameters of Speech Production  Respiration:  Adequate for speech production  Quality:   Presbyphonic   Intensity: Within functional limits   Resonance:  Hypernasal     Pain: No pain reported. EDUCATION:   The Speech Language Pathologist (SLP) completed education regarding results of evaluation and that intervention is warranted at this time. Learner: Patient and Family  Education: Reviewed results and recommendations of this evaluation, Reviewed diet and strategies, Demonstrated how to thicken liquids appropriately, Reviewed recommendations for follow-up and Education Related to Potential Risks and Complications Due to Impairment/Illness/Injury  Evaluation of Education:  Verbalizes understanding    This plan may be re-evaluated and revised as warranted. Evaluation Time includes thorough review of current medical information, gathering information on past medical history/social history and prior level of function, completion of standardized testing/informal observation of tasks, assessment of data and education on plan of care and goals. [x]The admitting diagnosis and active problem list, have been reviewed prior to initiation of this evaluation.     CPT Code: 95203  dysphagia study    INTERVENTION  CPT Code: 48939  dysphagia tx    Speech Pathologist (SLP) completed education with the patient/family regarding procedure of Modified Barium Swallow Study prior to exam and then type of swallowing impairment following completion of MBSS. Reviewed current solid/liquid consistency diet recommendations --   Dysphagia 3, Soft/advanced (Soft & Bite-sized) solids with  honey consistency (moderately thick - IDDSI level 3)  liquids and discussed compensatory strategies (Multiple swallow, Effortful swallow, Small bites/sips, Alternate solids and liquids, Throat clear and Cough post swallow and No straw) to ensure safe PO intake. Images from MBSS reviewed with patient/ family and education provided. Reviewed aspiration precautions. Encouraged patient and/or family to engage SLP in unstructured Q&A session relative to identified deficit areas; indicated understanding of all information provided via satisfactory verbal response.             ACTIVE PROBLEM LIST:   Patient Active Problem List   Diagnosis    Carotid atherosclerosis    Hypertension    Raynaud's disease    Thyroid disease       Jose Rene CCC-SLP  Speech-Language Pathologist  [id number]  1/21/2022

## 2022-02-28 ENCOUNTER — HOSPITAL ENCOUNTER (OUTPATIENT)
Dept: SPEECH THERAPY | Age: 87
Setting detail: THERAPIES SERIES
Discharge: HOME | End: 2022-02-28
Payer: MEDICARE

## 2022-02-28 PROCEDURE — 92610 EVALUATE SWALLOWING FUNCTION: CPT

## 2022-02-28 NOTE — PROGRESS NOTES
[address] [address]  Outpatient Speech Therapy  Phone: [phone] Fax: [phone]     SPEECH/LANGUAGE PATHOLOGY  CLINICAL ASSESSMENT OF SWALLOWING FUNCTION   and PLAN OF CARE      PATIENT NAME:  Sheryl Perera  (male)     MRN:  [id number]    :  1931  (80 y.o.)  STATUS:  Outpatient clinic   TODAY'S DATE:  2022  REFERRING PROVIDER:    Sundeep Degroot NPI:  [id number]  SPECIFIC PROVIDER ORDER: SLP swallowing-dysphagia evaluation and treatment (please include Vital Stimulation- 12 sessions and renewal) Date of order:  22  REFERRAL DIAGNOSIS: Swallowing Disorder R13.10  EVALUATING THERAPIST: MARINA Tobias    CERTIFICATION/RECERTIFICATION PERIOD: 2022 to 22  INSURANCE PROVIDER: [address] [place] [address]     CPT Codes  Evaluation: 49056  bedside swallow eval    Treatment: Recommending speech therapy 1-2 times per week (pending patient tolerance) for 12 visits focusing on the following CPT codes:  11635  dysphagia tx   (12 units)  99697  neuromuscular re-education,  DPNS (24 units)    60 minutes per session    REFERRING/TREATMENT DIAGNOSIS: Dysphagia, unspecified [R13.10]                  RESULTS:    DYSPHAGIA DIAGNOSIS:   Clinical indicators of Mild-Moderate Oral Phase Dysphagia  and Moderate-Severe Pharyngeal Phase Dysphagia     DIET RECOMMENDATIONS:  Soft and bite size consistency solids (IDDSI level 6) with  honey consistency (moderately thick - IDDSI level 3)  liquids, MEDICATION ADMINISTRATION and Administer medication crushed, as able, with pudding/applesauce     FEEDING RECOMMENDATIONS:     Assistance level:  Stand by assistance is needed during all oral intake      Compensatory strategies recommended: Multiple swallow, Effortful swallow, Small bites/sips, Alternate solids and liquids, Throat clear and No straw      Discussed recommendations with nursing and/or faxed report to referring provider: Yes    SPEECH THERAPY  PLAN OF CARE   The dysphagia POC is established based on physician order, dysphagia diagnosis and results of clinical assessment     Outpatient OR Home Care Skilled SLP intervention for dysphagia management is recommended 1-2 times per week to address the established treatment plan    Conditions Requiring Skilled Therapeutic Intervention for dysphagia:    Oral motor strength/coordination impairment  Throat clearing during PO intake   Coughing during PO intake      Specific dysphagia interventions to include:     Compensatory strategy training   Therapeutic exercises  Deep Pharyngeal Neuromuscular Stimulation (DPNS) if available   Neuromuscular Electrical Stimulation (NMES) if available     Specific instructions for next treatment:  initiate instruction of therapeutic exercises and Vital Stimulation therapy      Patient Treatment Goals:    Short Term Goals:    1. Vital stimulation treatment to target the following placements/muscle groups/muscle response secondary to the patients symptoms of premature spillage, penetration/aspiration, pharyngeal weakness, residuals, and penetration:    A. Placement 2B to increase function to the laryngeal extrinsics and suprahyoid muscles to improve hyolaryngeal excursion. B. Placement 3A to increase function to the digastric and thyrohyoid muscles to improve hyolaryngeal excursion. C. Placement 3B to increase function to the superior/middle/inferior pharyngeal constrictors and the pharyngeal shortening muscles to improve pharyngeal constriction. 2. Provide Deep Pharyngeal Neuromuscular Stimulation (DPNS) with focus on the following goals:       A. Provide increased sensory input to light pressure receptors and increase cutaneous pressure to posterior tongue for timely initiation of pharyngeal swallow (CN V, VII, IX)      B.  Elicit reflexive motor response to improve integrity of velar elevation, tongue base retraction, hyolaryngeal excursion, and pharyngeal constrictor movement (CN V, VII, X, XII)      3. Patient will complete Shaker and/or Chin Tuck Against Resistance (CTAR) to improve UES function to reduce pharyngeal residuals and reduce risk of penetration/aspiration with moderate verbal prompts. 4. Patient will complete oral motor strength/coordination exercises to improve bolus prep/control and mastication with moderate verbal prompts. 5. Patient will complete BOTR strength/ ROM exercises to reduce pharyngeal residuals and improve epiglottic inversion with  moderate verbal prompts. 6. Patient will complete laryngeal strength/ ROM therapeutic exercises to improve airway protection for the least restrictive PO diet with  moderate verbal prompts     7. Patient will tolerate the least restrictive PO diet to maintain adequate nutrition/ hydration via use of safe swallow/ compensatory strategies with  minimal verbal prompts and no more than 1 overt sign or symptoms of penetration or aspiration. 8. Instruction regarding appropriate implementation of compensatory strategies to improve integrity of swallow function during PO intake. 9. Patient  will complete Effortful Swallow therapeutically to target increased oral and base of tongue pressure, increased pharyngeal constrictor contractions, and increased UES relaxation duration to reduce pharyngeal residue with moderate verbal prompts     10. Patient will complete Daniela Maneuver therapeutically to target increased pharyngeal constrictor contractions to reduce pharyngeal residue with moderate verbal prompts           11. Repeat swallow study 8-12 weeks post initiation of therapy or as deemed appropriate by the managing physician. Long Term Goals:   Pt will improve oropharyngeal swallow function to ensure airway protection during PO intake to maintain adequate nutrition/hydration and decrease signs/symptoms of aspiration to less than 1 x/day.       Patient/family Goal:    To get stronger and To get rid of thickened liquids    Plan of care discussed with Patient and Family   The Patient and Family understand(s) the diagnosis, prognosis and plan of care     Rehabilitation Potential/Prognosis: good                    ADMITTING DIAGNOSIS: Dysphagia, unspecified [R13.10]     PATIENT REPORT/COMPLAINT:     Doris Vargas initially referred for dysphagia therapy by Dr. Tho Flaherty of [address] [place] [address] Pathology Department for dysphagia evaluation and treatment due to a diagnosis of oropharyngeal dysphagia (R13.12).   Patient was accompanied to the initial session on 10/19/21 by his daughter,Cheri Corinne Raymond, they provided medical history information. Lynnette Romero reported that his swallowing difficulty began approximately 1 year ago. Haile Coats reported coughing while eating and drinking and liquid coming out of his nose when he bent over. Haile Coats was initially seen by an ENT, Dr. Clyde Tejada reported that nasal sinus drainage was ruled out. Haile Coats was referred GI, Dr. Citlalli Pisano who recommended a MBSS.  Patient underwent a MBSS at [facility] and was diagnosed with dysphagia.  A summary of the results of that assessment will be indicated below:     SUMMARY OF MBSS COMPLETED AT [facility] [place] 8/26/21 BY SLP, Prisca Guadalupe:  Patient was presented thin liquids, nectar thick liquids, honey thick liquids, pureed and solids.  Oral phase for tongue control revealed posterior escape of less than half of the bolus.  Oral residue collection was noted on the oral structure.  Initiation of the pharyngeal swallow was noted as the bolus head was at the vallecular pit.  The pharyngeal phase of swallowing revealed partial superior movement of the thyroid cartilage and /or partial approximation of arytenoids to epiglottic petiole.  Partial anterior hyoid excursion movement noted.  Laryngeal vestibular closure/height of the swallow was incomplete with a narrow column of air/contrast in the laryngeal vestibule.  The pharyngeal stripping wave was present although diminished.  Tongue base retraction revealed a wide column of contrast of air between tongue base and pharyngeal wall.  A collection of residue was noted within or on the pharyngeal structures.  Patient required 3-4 re-swallows in order to reduce to trace-minimal amount.  Complete esophageal clearance in an upright position was noted.  Penetration was noted during the swallow for thin, nectar, and pureed consistencies.  Aspiration after the swallow for thin liquids and nectar thick liquid by cup.  Overall, patient demonstrated a oropharyngeal dysphagia.  Diet recommendations were for a regular consistency diet with mildly thick liquids IDDSI level 2 (nectar thick) via teaspoon, medications crushed in puree (pudding/applesauce).  Swallowing precautions recommendations: alternate bites/sips, double swallows, feed/eat at a slow rate, liquids presented, maintain an upright position for 20-30 minutes following all oral intake, no straw, sit upright 90 degrees for all PO intake, small bites/sips, throat clear, re-swallow, use extra moistening agents, reduced bite size.  Liquids presented by spoon. Mr. Emanuel Flores was seen for 15 outpatient dysphagia therapy sessions with SLP Corwin Ynu from October 19, 2021 through January 4, 2021 consisting of exercises, as well as Deep Pharyngeal Neuromuscular Stimulation. A repeat Video Swallow Study was completed on January 21, 2021 with the following results:     SUMMARY OF MBSS COMPLETED ON 1/21/22 AT [facility] BY SLP, Florencio Mccord, SLP:               Patient was presented nectar thick liquid, honey thick liquid, pureed foods and soft solid foods. Method of intake was by cup and spoon. Patient was self fed and fed by the clinician. Patient was standing. Delayed A-P transit due to reduced lingual strength was noted. Decreased bolus formation resulting in observed premature pharyngeal spillage was noted. Oral residuals were noted on the base of the tongue and throughout the oral cavity. Delayed initiation of the pharyngeal swallow was noted with swallow reflex triggered at the level of the vallecula. Reduced tongue base retraction and impaired epiglottic inversion resulting in residuals in vallecula and/or posterior pharyngeal wall for all consistencies administered which partially cleared with cued multiple swallow. Residuals in the pyriform sinuses were noted due to pharyngeal weakness for all consistencies administered which partially cleared with spontaneous multiple swallow. Laryngeal penetration occurred prior to aspiration. Laryngeal penetration occurred in the absence of aspiration DURING the swallow for nectar consistency liquid and honey thick liquids due to inadequate laryngeal closure which remained in the laryngeal vestibule. Laryngeal penetration was mild-moderate and occurred consistently. An absent cough/throat clear was noted. Laryngeal penetration occurred in the absence of aspiration AFTER the swallow for nectar consistency liquid due to pharyngeal residuals which remained in the laryngeal vestibule and penetrated deep into the laryngeal vestibule (to the level of the true vocal folds). Laryngeal penetration was mild-moderate and occurred consistently.  An absent cough/throat clear was noted. Aspiration occurred AFTER the swallow for nectar consistency liquid due to pharyngeal residuals and residuals in the laryngeal vestibule . Aspiration was moderate and occurred inconsistently. An absent cough/throat clear was noted. Compensatory strategies that were beneficial included: multiple swallow, effortful swallow, small bites/sips, alternate solids and liquids, throat clear and cough post swallow and no straw. No structural/functional anomalies were noted.  The cervical esophagus appeared adequate. Overall, the patient demonstrated a mild-moderate oral phase dysphagia and moderate-severe pharyngeal phase dysphagia. Patient was recommended for a soft and bite size consistency solids (IDDSI level 6) diet with honey consistency (moderately thick - IDDSI level 3) liquids. Administer medication crushed, as able, with pudding/applesauce. Stand by assistance is needed during all oral intake. Compensatory strategies recommended: Multiple swallow, Effortful swallow, Small bites/sips, Alternate solids and liquids, Throat clear and Cough post swallow and No straw. Other recommendations included: 1) Repeat Video Swallow Study (MBSS) is recommended in 6 weeks and requires a new physician order 2) A Neuro consult is recommended (Pt may benefit from a neurology consult secondary to ongoing dysphagia and hypernasal speech quality at times.)     Secondary to the regression in his swallow function, outpatient dysphagia therapy with SLP, Danae Mathias was discontinued. A new order was received from Dr. Terrance Barajas to resume dysphagia therapy on 2-22-22 with incorporation of Vital Stimulation therapy. Patient was seen at this facility [facility]) on February 28, 2022 for a swallow evaluation and to update his plan of care. SLP and patient reviewed his history. He is following with neurology, but has not received any answers associated with the onset of his dysphagia and hypernasal speech. PRIOR LEVEL OF SWALLOW FUNCTION:    PAST HISTORY OF DYSPHAGIA?: yes    Home diet: Soft and bite size consistency solids (IDDSI level 6) with  honey consistency (moderately thick - IDDSI level 3)  liquids    PROCEDURE:  Consistencies Administered During the Evaluation   Liquids: honey thick liquid   Solids:  pureed foods and solid foods      Method of Intake:   cup, spoon  Self fed      Position:   Seated, upright    CLINICAL ASSESSMENT:  Oral Stage:       Delayed A-P transit due to: reduced lingual strength. Mild oral residuals that cleared with a liquid wash.          Pharyngeal Stage:    Throat clearing present after presentation of honey consistency liquid and pureed foods. Patient reported that he was instructed to use this strategy following his Video Swallow Study to protect his airway. Clear vocal quality maintained. Multiple swallows were noted after presentation of pureed foods and solid foods. This was also encouraged following his Video Swallow Study. See above report with details from his Video Swallow Study completed on January 21, 2022 (delayed swallow onset, residuals, penetration, and aspiration). Cognition:   Alert & Oriented; however required repetition of most information x2 for full comprehension of details. Hand-outs and detailed descriptions provided. Oral Peripheral Examination   Generalized oral weakness- mild     Current Respiratory Status    room air     Parameters of Speech Production  Respiration:  Adequate for speech production  Quality:   Hypernasal, Breathy    Intensity: Quiet at times     Volitional Swallow: present     Volitional Cough:   present     Pain: No pain reported. EDUCATION:   The Speech Language Pathologist (SLP) completed education regarding results of evaluation and that intervention is warranted at this time. Learner: Patient and Family  Education: Reviewed results and recommendations of this evaluation, Reviewed diet and strategies, Reviewed signs, symptoms and risks of aspiration and Demonstrated how to thicken liquids appropriately  Evaluation of Education:  Verbalizes understanding    This plan may be re-evaluated and revised as warranted.       Treatment goals discussed with Patient and Family   The Patient and Family understand(s) the diagnosis, prognosis and plan of care     Evaluation Time includes thorough review of current medical information, gathering information on past medical history/social history and prior level of function, completion of standardized testing/informal observation of tasks, assessment of data and education on plan of care and goals. The admitting diagnosis and active problem list, as listed below have been reviewed prior to initiation of this evaluation. ACTIVE PROBLEM LIST:   Patient Active Problem List   Diagnosis    Carotid atherosclerosis    Hypertension    Raynaud's disease    Thyroid disease       Teddy Gomez M.S. CCC-SLP  [id number]  2/28/2022      [address]        Phone: [phone]     If you have any questions or concerns, please don't hesitate to call. Thank you for your referral.    Physician/Provider Signature:________________________________Date:__________________  By signing above, the therapists plan is approved by the physician/provider. All patients under [employer] must be signed by physician/provider.

## 2022-03-09 ENCOUNTER — HOSPITAL ENCOUNTER (OUTPATIENT)
Dept: SPEECH THERAPY | Age: 87
Setting detail: THERAPIES SERIES
Discharge: HOME | End: 2022-03-09
Payer: MEDICARE

## 2022-03-09 PROCEDURE — 92526 ORAL FUNCTION THERAPY: CPT

## 2022-03-09 NOTE — PROGRESS NOTES
[address] [address]  Outpatient Speech Therapy  Phone: [phone] Fax: [phone]       SPEECH LANGUAGE PATHOLOGY  DAILY PROGRESS NOTE      SUBJECTIVE:    Patient seen for 60 minute speech therapy session to target dysphagia.  He was pleasant and cooperative.       OBJECTIVE:     He is currently consuming a diet of soft and bite size consistency solids (IDDSI level 6) with honey consistency (moderately thick - IDDSI level 3)  liquids with use of multiple swallow, effortful swallow, small bites/sips, alternate solids and liquids, throat clear and no straw. He reported good tolerance during meals over the past week. He is using the paper that SLP provided last week to ensure he is following his compensatory strategies.       Explained the purpose and procedure of Vital Stimulation. Patient provided verbal consent to treat. Vital Stim placement 3B used this date to increase function to the superior/middle/inferior pharyngeal constrictors and the pharyngeal shortening muscles to improve pharyngeal constriction. Top channel initially set at 9.0mA and bottom channel initially set at 9.0mA to evoke a transition from a tingling sensation to a pulling sensation. An effortful swallow noted. During the session, top channel increased to 17. 0mA and bottom channel increased to 17. 0mA to maintain a pulling sensation and audible swallow.  Patient alerted SLP when he did not feel the machine as strong.       While Vital Stim unit was in place, patient completed Deep Pharyngeal Neuromuscular Stimulation (30 minutes) and lollipop trials to maintain continuous swallowing during a functionally based exercise.       Continued Deep Pharyngeal Neuromuscular Stimulation (DPNS) with focus on the following goals:      1.  Provide increased sensory input to light pressure receptors and increase cutaneous pressure to posterior tongue for timely initiation of pharyngeal swallow (CN V, VII, IX)     2. Elicit reflexive motor response to improve integrity of velar elevation, tongue base retraction, hyolaryngeal excursion, and pharyngeal constrictor movement (CN V, VII, X, XII)        Results -- percentage indicates elicitation of desired motor response in targeted area     Velar elevation: 100%     Tongue base retraction: 50%     Superior pharyngeal constrictor wall contraction: 100%     Lateral pharyngeal wall contraction: 100%     Physiologic modified gag response: absent     Lingual groove: weak      Laryngeal elevation/depression time: Mild delay      Spontaneous elicitation of reflexive swallow post stimulation: 75%      Patient tolerated 48 stimulations this session utilizing frozen/cold lemon glycerine swabs, and demonstrated good therapeutic response to intervention. SLP paced the DPNS to ensure he is not fatiguing.       During lollipop trials, SLP cued a small lick and an immediate effortful swallow to continue swallowing with the Vital Stim unit in place. He presented with inconsistent throat clearing, but this appears to be habitual because he uses this strategy during meals. Clear vocal quality maintained.       No signs of respiratory distress or mucosal irritation present following completion of this intervention.      No discomfort observed or reported following removal of Vital Stim unit.  Stronger voice noted upon exit from the therapy room.      ASSESSMENT:  Making progress toward meeting therapy goals     PLAN OF CARE:     Will continue speech pathology intervention as per established Plan of Care:    Patient will improve oropharyngeal swallow function to ensure airway protection during PO intake to maintain adequate nutrition/hydration and decrease signs/symptoms of aspiration to less than 1 x/day.        1. Vital stimulation treatment to target the following placements/muscle groups/muscle response secondary to the patients symptoms of premature spillage, penetration/aspiration, pharyngeal weakness, residuals, and penetration:     A. Placement 2B to increase function to the laryngeal extrinsics and suprahyoid muscles to improve hyolaryngeal excursion. B. Placement 3A to increase function to the digastric and thyrohyoid muscles to improve hyolaryngeal excursion. C. Placement 3B to increase function to the superior/middle/inferior pharyngeal constrictors and the pharyngeal shortening muscles to improve pharyngeal constriction.         2. Provide Deep Pharyngeal Neuromuscular Stimulation (DPNS) with focus on the following goals:       A. Provide increased sensory input to light pressure receptors and increase cutaneous pressure to posterior tongue for timely initiation of pharyngeal swallow (CN V, VII, IX)      B. Elicit reflexive motor response to improve integrity of velar elevation, tongue base retraction, hyolaryngeal excursion, and pharyngeal constrictor movement (CN V, VII, X, XII)        3. Patient will complete Shaker and/or Chin Tuck Against Resistance (CTAR) to improve UES function to reduce pharyngeal residuals and reduce risk of penetration/aspiration with moderate verbal prompts.      4. Patient will complete oral motor strength/coordination exercises to improve bolus prep/control and mastication with moderate verbal prompts.     5. Patient will complete BOTR strength/ ROM exercises to reduce pharyngeal residuals and improve epiglottic inversion with  moderate verbal prompts.      6. Patient will complete laryngeal strength/ ROM therapeutic exercises to improve airway protection for the least restrictive PO diet with  moderate verbal prompts      7. Patient will tolerate the least restrictive PO diet to maintain adequate nutrition/ hydration via use of safe swallow/ compensatory strategies with  minimal verbal prompts and no more than 1 overt sign or symptoms of penetration or aspiration.      8.  Instruction regarding appropriate implementation of compensatory strategies to improve integrity of swallow function during PO intake.     9. Patient  will complete Effortful Swallow therapeutically to target increased oral and base of tongue pressure, increased pharyngeal constrictor contractions, and increased UES relaxation duration to reduce pharyngeal residue with moderate verbal prompts      10. Patient will complete Daniela Maneuver therapeutically to target increased pharyngeal constrictor contractions to reduce pharyngeal residue with moderate verbal prompts            11.  Repeat swallow study 8-12 weeks post initiation of therapy or as deemed appropriate by the managing physician.  Ammon Ceron M.S. [address]  3/9/2022        CPT CODE:       91320  dysphagia tx

## 2022-03-16 ENCOUNTER — HOSPITAL ENCOUNTER (OUTPATIENT)
Dept: SPEECH THERAPY | Age: 87
Setting detail: THERAPIES SERIES
Discharge: HOME | End: 2022-03-16
Payer: MEDICARE

## 2022-03-16 PROCEDURE — 92526 ORAL FUNCTION THERAPY: CPT

## 2022-03-16 NOTE — PROGRESS NOTES
[address] [address]  Outpatient Speech Therapy  Phone: [phone] Fax: [phone]       SPEECH LANGUAGE PATHOLOGY  DAILY PROGRESS NOTE      SUBJECTIVE:    Patient seen for 60 minute speech therapy session to target dysphagia.  He was pleasant and cooperative. His daughter Cristi Flores accompanied him to the session.       OBJECTIVE:     He is currently consuming a diet of soft and bite size consistency solids (IDDSI level 6) with honey consistency (moderately thick - IDDSI level 3)  liquids with use of multiple swallow, effortful swallow, small bites/sips, alternate solids and liquids, throat clear and no straw. He reported good tolerance during meals over the past week. SLP provided him with a new paper listing the compensatory strategies because he stated that he misplaced his. Reviewed the purpose and procedure of Vital Stimulation. Patient provided verbal consent to treat. Vital Stim placement 3B used this date to increase function to the superior/middle/inferior pharyngeal constrictors and the pharyngeal shortening muscles to improve pharyngeal constriction. Top channel initially set at 10.0mA and bottom channel initially set at 10.0mA to evoke a transition from a tingling sensation to a pulling sensation. An effortful swallow noted. During the session, top channel increased to 15. 0mA and bottom channel increased to 17. 0mA to maintain a pulling sensation and audible swallow.  Patient alerted SLP when he did not feel the machine as strong.       While Vital Stim unit was in place, patient completed Deep Pharyngeal Neuromuscular Stimulation (30 minutes), Hyoid Lift maneuver, and pudding trials to maintain continuous swallowing during a functionally based exercise.       Continued Deep Pharyngeal Neuromuscular Stimulation (DPNS) with focus on the following goals:      1.  Provide increased sensory input to light pressure receptors and increase cutaneous pressure to posterior tongue for timely initiation of pharyngeal swallow (CN V, VII, IX)     2. Elicit reflexive motor response to improve integrity of velar elevation, tongue base retraction, hyolaryngeal excursion, and pharyngeal constrictor movement (CN V, VII, X, XII)        Results -- percentage indicates elicitation of desired motor response in targeted area     Velar elevation: 100%     Tongue base retraction: 60%     Superior pharyngeal constrictor wall contraction: 100%     Lateral pharyngeal wall contraction: 100%     Physiologic modified gag response: absent     Lingual groove: weak      Laryngeal elevation/depression time: Mild delay      Spontaneous elicitation of reflexive swallow post stimulation: 85%      Patient tolerated 48 stimulations this session utilizing frozen/cold lemon glycerine swabs, and demonstrated good therapeutic response to intervention. SLP paced the DPNS to ensure he is not fatiguing.       SLP introduced patient to the Hyoid Lift maneuver to target improved muscle strength and control. He was able to complete this exercise with 2-3 inch pieces of paper with a 3 second hold. Nasal emission observed, but he was able to achieve a seal with the straw to the paper. Encouraged continued practice at home. During pudding trials, SLP cued a small bite and an immediate effortful swallow to maintain continuous swallowing with the Vital Stim unit in place. He presented with inconsistent throat clearing, but this appears to be habitual because he uses this strategy during meals.   Clear vocal quality maintained.       No signs of respiratory distress or mucosal irritation present following completion of this intervention.      No discomfort observed or reported following removal of Vital Stim unit.  Stronger voice noted upon exit from the therapy room.      ASSESSMENT:  Making progress toward meeting therapy goals     PLAN OF CARE:     Will continue speech pathology intervention as per established Plan of Care:    Patient will improve oropharyngeal swallow function to ensure airway protection during PO intake to maintain adequate nutrition/hydration and decrease signs/symptoms of aspiration to less than 1 x/day. 1. Vital stimulation treatment to target the following placements/muscle groups/muscle response secondary to the patients symptoms of premature spillage, penetration/aspiration, pharyngeal weakness, residuals, and penetration:     A. Placement 2B to increase function to the laryngeal extrinsics and suprahyoid muscles to improve hyolaryngeal excursion. B. Placement 3A to increase function to the digastric and thyrohyoid muscles to improve hyolaryngeal excursion. C. Placement 3B to increase function to the superior/middle/inferior pharyngeal constrictors and the pharyngeal shortening muscles to improve pharyngeal constriction.         2. Provide Deep Pharyngeal Neuromuscular Stimulation (DPNS) with focus on the following goals:       A. Provide increased sensory input to light pressure receptors and increase cutaneous pressure to posterior tongue for timely initiation of pharyngeal swallow (CN V, VII, IX)      B. Elicit reflexive motor response to improve integrity of velar elevation, tongue base retraction, hyolaryngeal excursion, and pharyngeal constrictor movement (CN V, VII, X, XII)        3. Patient will complete Shaker and/or Chin Tuck Against Resistance (CTAR) to improve UES function to reduce pharyngeal residuals and reduce risk of penetration/aspiration with moderate verbal prompts.      4. Patient will complete oral motor strength/coordination exercises to improve bolus prep/control and mastication with moderate verbal prompts.     5.  Patient will complete BOTR strength/ ROM exercises to reduce pharyngeal residuals and improve epiglottic inversion with  moderate verbal prompts.      6. Patient will complete laryngeal strength/ ROM therapeutic exercises to improve airway protection for the least restrictive PO diet with  moderate verbal prompts      7. Patient will tolerate the least restrictive PO diet to maintain adequate nutrition/ hydration via use of safe swallow/ compensatory strategies with  minimal verbal prompts and no more than 1 overt sign or symptoms of penetration or aspiration.      8. Instruction regarding appropriate implementation of compensatory strategies to improve integrity of swallow function during PO intake.     9. Patient  will complete Effortful Swallow therapeutically to target increased oral and base of tongue pressure, increased pharyngeal constrictor contractions, and increased UES relaxation duration to reduce pharyngeal residue with moderate verbal prompts      10. Patient will complete Daniela Maneuver therapeutically to target increased pharyngeal constrictor contractions to reduce pharyngeal residue with moderate verbal prompts            11.  Repeat swallow study 8-12 weeks post initiation of therapy or as deemed appropriate by the managing physician.  Ruddy Simmons M.S. [address] [address]  3/16/2022        CPT CODE:       66216  dysphagia tx

## 2022-03-23 ENCOUNTER — APPOINTMENT (OUTPATIENT)
Dept: SPEECH THERAPY | Age: 87
End: 2022-03-23
Payer: MEDICARE

## 2022-03-24 ENCOUNTER — HOSPITAL ENCOUNTER (OUTPATIENT)
Dept: SPEECH THERAPY | Age: 87
Setting detail: THERAPIES SERIES
Discharge: HOME | End: 2022-03-24
Payer: MEDICARE

## 2022-03-24 PROCEDURE — 92526 ORAL FUNCTION THERAPY: CPT

## 2022-03-24 NOTE — PROGRESS NOTES
reflexive motor response to improve integrity of velar elevation, tongue base retraction, hyolaryngeal excursion, and pharyngeal constrictor movement (CN V, VII, X, XII)        Results -- percentage indicates elicitation of desired motor response in targeted area     Velar elevation: 100%     Tongue base retraction: 60%     Superior pharyngeal constrictor wall contraction: 100%     Lateral pharyngeal wall contraction: 100%     Physiologic modified gag response: absent     Lingual groove: weak      Laryngeal elevation/depression time: Mild delay      Spontaneous elicitation of reflexive swallow post stimulation: 90%      Patient tolerated 60 stimulations this session utilizing frozen/cold lemon glycerine swabs, and demonstrated good therapeutic response to intervention. SLP paced the DPNS to ensure he is not fatiguing.       SLP introduced patient to resistive tongue base strengthening. He was able to complete three repetitions of a 10 second hold and three repetitions of 10 quick pulls. Breaks taken between repetitions. He will continue this exercise at home for carryover.     No signs of respiratory distress or mucosal irritation present following completion of this intervention.      No discomfort observed or reported following removal of Vital Stim unit.       ASSESSMENT:  Making progress toward meeting therapy goals     PLAN OF CARE:     Will continue speech pathology intervention as per established Plan of Care:    Patient will improve oropharyngeal swallow function to ensure airway protection during PO intake to maintain adequate nutrition/hydration and decrease signs/symptoms of aspiration to less than 1 x/day. 1. Vital stimulation treatment to target the following placements/muscle groups/muscle response secondary to the patients symptoms of premature spillage, penetration/aspiration, pharyngeal weakness, residuals, and penetration:     A.  Placement 2B to increase function to the laryngeal extrinsics and suprahyoid muscles to improve hyolaryngeal excursion. B. Placement 3A to increase function to the digastric and thyrohyoid muscles to improve hyolaryngeal excursion. C. Placement 3B to increase function to the superior/middle/inferior pharyngeal constrictors and the pharyngeal shortening muscles to improve pharyngeal constriction.         2. Provide Deep Pharyngeal Neuromuscular Stimulation (DPNS) with focus on the following goals:       A. Provide increased sensory input to light pressure receptors and increase cutaneous pressure to posterior tongue for timely initiation of pharyngeal swallow (CN V, VII, IX)      B. Elicit reflexive motor response to improve integrity of velar elevation, tongue base retraction, hyolaryngeal excursion, and pharyngeal constrictor movement (CN V, VII, X, XII)        3. Patient will complete Shaker and/or Chin Tuck Against Resistance (CTAR) to improve UES function to reduce pharyngeal residuals and reduce risk of penetration/aspiration with moderate verbal prompts.      4. Patient will complete oral motor strength/coordination exercises to improve bolus prep/control and mastication with moderate verbal prompts.     5. Patient will complete BOTR strength/ ROM exercises to reduce pharyngeal residuals and improve epiglottic inversion with  moderate verbal prompts.      6. Patient will complete laryngeal strength/ ROM therapeutic exercises to improve airway protection for the least restrictive PO diet with  moderate verbal prompts      7. Patient will tolerate the least restrictive PO diet to maintain adequate nutrition/ hydration via use of safe swallow/ compensatory strategies with  minimal verbal prompts and no more than 1 overt sign or symptoms of penetration or aspiration.      8. Instruction regarding appropriate implementation of compensatory strategies to improve integrity of swallow function during PO intake.     9.  Patient  will complete Effortful Swallow therapeutically to target increased oral and base of tongue pressure, increased pharyngeal constrictor contractions, and increased UES relaxation duration to reduce pharyngeal residue with moderate verbal prompts      10. Patient will complete Daniela Maneuver therapeutically to target increased pharyngeal constrictor contractions to reduce pharyngeal residue with moderate verbal prompts            11.  Repeat swallow study 8-12 weeks post initiation of therapy or as deemed appropriate by the managing physician.  Citlalli Muñiz M.S. [address]  3/24/2022        CPT CODE:       77538  dysphagia tx

## 2022-03-30 ENCOUNTER — HOSPITAL ENCOUNTER (OUTPATIENT)
Dept: SPEECH THERAPY | Age: 87
Setting detail: THERAPIES SERIES
Discharge: HOME | End: 2022-03-30
Payer: MEDICARE

## 2022-03-30 PROCEDURE — 92526 ORAL FUNCTION THERAPY: CPT

## 2022-03-30 NOTE — PROGRESS NOTES
[address] [address]  Outpatient Speech Therapy  Phone: [phone] Fax: [phone]       SPEECH LANGUAGE PATHOLOGY  DAILY PROGRESS NOTE      SUBJECTIVE:    Patient seen for 60 minute speech therapy session to target dysphagia.  He was pleasant and cooperative.      OBJECTIVE:     He is currently consuming a diet of soft and bite size consistency solids (IDDSI level 6) with honey consistency (moderately thick - IDDSI level 3)  liquids with use of multiple swallow, effortful swallow, small bites/sips, alternate solids and liquids, throat clear and no straw. He reported good tolerance during meals over the past week. He uses all compensatory strategies. SLP reviewed his home exercise program consisting of laryngeal and tongue base strengthening exercises. He reports daily completion. Reviewed the purpose and procedure of Vital Stimulation. Patient provided verbal consent to treat. Vital Stim placement 3A used this date to increase function to the digastric and thyrohyoid muscles to improve hyolaryngeal excursion.  Left channel initially set at 7.0mA and right channel initially set at 6.0mA to evoke a transition from a tingling sensation to a pulling sensation. An effortful swallow noted. During the session, left channel increased to 13. 0mA and right channel increased to 12. 0mA to maintain a pulling sensation and audible swallow.  Patient alerted SLP when he did not feel the machine as strong. While Vital Stim unit was in place, patient completed Deep Pharyngeal Neuromuscular Stimulation (30 minutes) laryngeal elevation exercises, and po trials to maintain continuous swallowing during a functionally based exercise.       Continued Deep Pharyngeal Neuromuscular Stimulation (DPNS) with focus on the following goals:      1.  Provide increased sensory input to light pressure receptors and increase cutaneous pressure to posterior tongue for timely initiation of pharyngeal swallow (CN V, VII, IX)     2. Elicit reflexive motor response to improve integrity of velar elevation, tongue base retraction, hyolaryngeal excursion, and pharyngeal constrictor movement (CN V, VII, X, XII)        Results -- percentage indicates elicitation of desired motor response in targeted area     Velar elevation: 100%     Tongue base retraction: 60%     Superior pharyngeal constrictor wall contraction: 100%     Lateral pharyngeal wall contraction: 100%     Physiologic modified gag response: absent     Lingual groove: weak      Laryngeal elevation/depression time: Timely     Spontaneous elicitation of reflexive swallow post stimulation: 90%- he independently used multiple swallows     Patient tolerated 60 stimulations this session utilizing frozen/cold lemon glycerine swabs, and demonstrated good therapeutic response to intervention. SLP paced the DPNS to ensure he is not fatiguing.       Patient completed the Hyoid Lift maneuver to target improved muscle strength and control. He was able to complete this exercise with 2-3 inch pieces of paper with a 3 second hold. Nasal emission observed, but he was able to achieve a seal with the straw to the paper. Encouraged continued practice at home.       During pudding trials, SLP cued a small bite and an immediate effortful swallow to maintain continuous swallowing with the Vital Stim unit in place. Ulisses Aguirre presented with inconsistent throat clearing, but this appears to be habitual because he uses this strategy during meals.   Clear vocal quality maintained.     No signs of respiratory distress or mucosal irritation present following completion of this intervention.      No discomfort observed or reported following removal of Vital Stim unit.       ASSESSMENT:  Making progress toward meeting therapy goals     PLAN OF CARE:     Will continue speech pathology intervention as per established Plan of Care:    Patient will improve oropharyngeal swallow function to ensure airway protection during PO intake to maintain adequate nutrition/hydration and decrease signs/symptoms of aspiration to less than 1 x/day. 1. Vital stimulation treatment to target the following placements/muscle groups/muscle response secondary to the patients symptoms of premature spillage, penetration/aspiration, pharyngeal weakness, residuals, and penetration:     A. Placement 2B to increase function to the laryngeal extrinsics and suprahyoid muscles to improve hyolaryngeal excursion. B. Placement 3A to increase function to the digastric and thyrohyoid muscles to improve hyolaryngeal excursion. C. Placement 3B to increase function to the superior/middle/inferior pharyngeal constrictors and the pharyngeal shortening muscles to improve pharyngeal constriction.         2. Provide Deep Pharyngeal Neuromuscular Stimulation (DPNS) with focus on the following goals:       A. Provide increased sensory input to light pressure receptors and increase cutaneous pressure to posterior tongue for timely initiation of pharyngeal swallow (CN V, VII, IX)      B. Elicit reflexive motor response to improve integrity of velar elevation, tongue base retraction, hyolaryngeal excursion, and pharyngeal constrictor movement (CN V, VII, X, XII)        3. Patient will complete Shaker and/or Chin Tuck Against Resistance (CTAR) to improve UES function to reduce pharyngeal residuals and reduce risk of penetration/aspiration with moderate verbal prompts.      4. Patient will complete oral motor strength/coordination exercises to improve bolus prep/control and mastication with moderate verbal prompts.     5.  Patient will complete BOTR strength/ ROM exercises to reduce pharyngeal residuals and improve epiglottic inversion with  moderate verbal prompts.      6. Patient will complete laryngeal strength/ ROM therapeutic exercises to improve airway protection for the least restrictive PO diet with  moderate verbal prompts      7. Patient will tolerate the least restrictive PO diet to maintain adequate nutrition/ hydration via use of safe swallow/ compensatory strategies with  minimal verbal prompts and no more than 1 overt sign or symptoms of penetration or aspiration.      8. Instruction regarding appropriate implementation of compensatory strategies to improve integrity of swallow function during PO intake.     9. Patient  will complete Effortful Swallow therapeutically to target increased oral and base of tongue pressure, increased pharyngeal constrictor contractions, and increased UES relaxation duration to reduce pharyngeal residue with moderate verbal prompts      10. Patient will complete Daniela Maneuver therapeutically to target increased pharyngeal constrictor contractions to reduce pharyngeal residue with moderate verbal prompts            11.  Repeat swallow study 8-12 weeks post initiation of therapy or as deemed appropriate by the managing physician.  Yahaira Rich M.S. [address]  3/30/2022        CPT CODE:       60127  dysphagia tx

## 2022-04-06 ENCOUNTER — HOSPITAL ENCOUNTER (OUTPATIENT)
Dept: SPEECH THERAPY | Age: 87
Setting detail: THERAPIES SERIES
Discharge: HOME | End: 2022-04-06
Payer: MEDICARE

## 2022-04-06 PROCEDURE — 92526 ORAL FUNCTION THERAPY: CPT

## 2022-04-06 NOTE — PROGRESS NOTES
[address]  Outpatient Speech Therapy  Phone: [phone] Fax: [phone]       SPEECH LANGUAGE PATHOLOGY  DAILY PROGRESS NOTE      SUBJECTIVE:    Patient seen for 60 minute speech therapy session to target dysphagia.  He was pleasant and cooperative.      OBJECTIVE:     He is currently consuming a diet of soft and bite size consistency solids (IDDSI level 6) with honey consistency (moderately thick - IDDSI level 3)  liquids with use of multiple swallow, effortful swallow, small bites/sips, alternate solids and liquids, throat clear and no straw. He reported good tolerance during meals over the past week. He uses all compensatory strategies. SLP reviewed his home exercise program consisting of laryngeal and tongue base strengthening exercises. He reports daily completion. Reviewed the purpose and procedure of Vital Stimulation. Patient provided verbal consent to treat. Vital Stim placement 3A used this date to increase function to the digastric and thyrohyoid muscles to improve hyolaryngeal excursion.  Left channel initially set at 4.0mA and right channel initially set at 4.0mA to evoke a transition from a tingling sensation to a pulling sensation. An effortful swallow noted. During the session, left channel increased to 12. 0mA and right channel increased to 12. 0mA to maintain a pulling sensation and audible swallow.  Patient alerted SLP when he did not feel the machine as strong. While Vital Stim unit was in place, patient completed Deep Pharyngeal Neuromuscular Stimulation (30 minutes) laryngeal elevation exercises, and po trials to maintain continuous swallowing during a functionally based exercise.       Continued Deep Pharyngeal Neuromuscular Stimulation (DPNS) with focus on the following goals:      1.  Provide increased sensory input to light pressure receptors and increase cutaneous pressure to posterior tongue for timely initiation of pharyngeal swallow (CN V, VII, IX)     2. Elicit reflexive motor response to improve integrity of velar elevation, tongue base retraction, hyolaryngeal excursion, and pharyngeal constrictor movement (CN V, VII, X, XII)        Results -- percentage indicates elicitation of desired motor response in targeted area     Velar elevation: 100%     Tongue base retraction: 60%     Superior pharyngeal constrictor wall contraction: 100%     Lateral pharyngeal wall contraction: 100%     Physiologic modified gag response: absent     Lingual groove: weak      Laryngeal elevation/depression time: Timely     Spontaneous elicitation of reflexive swallow post stimulation: 100%- he independently used multiple swallows     Patient tolerated 54 stimulations this session utilizing frozen/cold lemon glycerine swabs, and demonstrated good therapeutic response to intervention. SLP paced the DPNS to ensure he is not fatiguing.       Patient completed the Hyoid Lift maneuver to target improved muscle strength and control. He was able to complete this exercise with 2-3 inch pieces of paper with a 3 second hold. Nasal emission observed, but he was able to achieve a seal with the straw to the paper. Encouraged continued practice at home.       During pudding trials, SLP cued a small bite and an immediate effortful swallow to maintain continuous swallowing with the Vital Stim unit in place. [facility] presented with inconsistent throat clearing, but this appears to be habitual because he uses this strategy during meals.   Clear vocal quality maintained.     No signs of respiratory distress or mucosal irritation present following completion of this intervention.      No discomfort observed or reported following removal of Vital Stim unit.       ASSESSMENT:  Making progress toward meeting therapy goals     PLAN OF CARE:     Will continue speech pathology intervention as per established Plan of Care:    Patient will improve oropharyngeal swallow function to ensure airway protection during PO intake to maintain adequate nutrition/hydration and decrease signs/symptoms of aspiration to less than 1 x/day. 1. Vital stimulation treatment to target the following placements/muscle groups/muscle response secondary to the patients symptoms of premature spillage, penetration/aspiration, pharyngeal weakness, residuals, and penetration:     A. Placement 2B to increase function to the laryngeal extrinsics and suprahyoid muscles to improve hyolaryngeal excursion. B. Placement 3A to increase function to the digastric and thyrohyoid muscles to improve hyolaryngeal excursion. C. Placement 3B to increase function to the superior/middle/inferior pharyngeal constrictors and the pharyngeal shortening muscles to improve pharyngeal constriction.         2. Provide Deep Pharyngeal Neuromuscular Stimulation (DPNS) with focus on the following goals:       A. Provide increased sensory input to light pressure receptors and increase cutaneous pressure to posterior tongue for timely initiation of pharyngeal swallow (CN V, VII, IX)      B. Elicit reflexive motor response to improve integrity of velar elevation, tongue base retraction, hyolaryngeal excursion, and pharyngeal constrictor movement (CN V, VII, X, XII)        3. Patient will complete Shaker and/or Chin Tuck Against Resistance (CTAR) to improve UES function to reduce pharyngeal residuals and reduce risk of penetration/aspiration with moderate verbal prompts.      4. Patient will complete oral motor strength/coordination exercises to improve bolus prep/control and mastication with moderate verbal prompts.     5.  Patient will complete BOTR strength/ ROM exercises to reduce pharyngeal residuals and improve epiglottic inversion with  moderate verbal prompts.      6. Patient will complete laryngeal strength/ ROM therapeutic exercises to improve airway protection for the least restrictive PO diet with  moderate verbal prompts      7. Patient will tolerate the least restrictive PO diet to maintain adequate nutrition/ hydration via use of safe swallow/ compensatory strategies with  minimal verbal prompts and no more than 1 overt sign or symptoms of penetration or aspiration.      8. Instruction regarding appropriate implementation of compensatory strategies to improve integrity of swallow function during PO intake.     9. Patient  will complete Effortful Swallow therapeutically to target increased oral and base of tongue pressure, increased pharyngeal constrictor contractions, and increased UES relaxation duration to reduce pharyngeal residue with moderate verbal prompts      10. Patient will complete Daniela Maneuver therapeutically to target increased pharyngeal constrictor contractions to reduce pharyngeal residue with moderate verbal prompts            11.  Repeat swallow study 8-12 weeks post initiation of therapy or as deemed appropriate by the managing physician.  Cynthia Beyer M.S. [address]  4/6/2022        CPT CODE:       32554  dysphagia tx

## 2022-04-13 ENCOUNTER — HOSPITAL ENCOUNTER (OUTPATIENT)
Dept: SPEECH THERAPY | Age: 87
Setting detail: THERAPIES SERIES
Discharge: HOME | End: 2022-04-13
Payer: MEDICARE

## 2022-04-13 PROCEDURE — 92526 ORAL FUNCTION THERAPY: CPT

## 2022-04-13 NOTE — PROGRESS NOTES
[address]  Outpatient Speech Therapy  Phone: [phone] Fax: [phone]       SPEECH LANGUAGE PATHOLOGY  DAILY PROGRESS NOTE      SUBJECTIVE:    Patient seen for 60 minute speech therapy session to target dysphagia.  He was pleasant and cooperative.      OBJECTIVE:     He is currently consuming a diet of soft and bite size consistency solids (IDDSI level 6) with honey consistency (moderately thick - IDDSI level 3)  liquids with use of multiple swallow, effortful swallow, small bites/sips, alternate solids and liquids, throat clear and no straw. He reported good tolerance during meals over the past week. He uses all compensatory strategies. He did say he thinks he is swallowing easier. SLP reviewed his home exercise program consisting of laryngeal and tongue base strengthening exercises. He reports daily completion. Reviewed the purpose and procedure of Vital Stimulation. Patient provided verbal consent to treat. Vital Stim placement 3A used this date to increase function to the digastric and thyrohyoid muscles to improve hyolaryngeal excursion.  Left channel initially set at 9.0mA and right channel initially set at 9.0mA to evoke a transition from a tingling sensation to a pulling sensation. An effortful swallow noted. During the session, left channel increased to 25. 0mA and right channel increased to 25. 0mA to maintain a pulling sensation and audible swallow.  Patient alerted SLP when he did not feel the machine as strong. While Vital Stim unit was in place, patient completed Deep Pharyngeal Neuromuscular Stimulation (30 minutes), tongue base retraction exercises, and po trials to maintain continuous swallowing during a functionally based exercise.       Continued Deep Pharyngeal Neuromuscular Stimulation (DPNS) with focus on the following goals:      1.  Provide increased sensory input to light pressure receptors and increase cutaneous pressure to posterior tongue for timely initiation of pharyngeal swallow (CN V, VII, IX)     2. Elicit reflexive motor response to improve integrity of velar elevation, tongue base retraction, hyolaryngeal excursion, and pharyngeal constrictor movement (CN V, VII, X, XII)        Results -- percentage indicates elicitation of desired motor response in targeted area     Velar elevation: 100%     Tongue base retraction: 70% (improved)     Superior pharyngeal constrictor wall contraction: 100%     Lateral pharyngeal wall contraction: 100%     Physiologic modified gag response: absent     Lingual groove: fair (improving)     Laryngeal elevation/depression time: Timely     Spontaneous elicitation of reflexive swallow post stimulation: 100%- he independently used multiple swallows     Patient tolerated 54 stimulations this session utilizing frozen/cold lemon glycerine swabs, and demonstrated good therapeutic response to intervention. SLP paced the DPNS to ensure he is not fatiguing.       SLPreviewed resistive tongue base strengthening. He was able to complete three repetitions of a 10 second hold and three repetitions of 10 quick pulls. Breaks taken between repetitions. He will continue this exercise at home for carryover.     During pudding trials, SLP cued a small bite and an immediate effortful swallow to maintain continuous swallowing with the Vital Stim unit in place. Rosey Bañuelos presented with inconsistent throat clearing and some coughing, but this appears to be habitual because he uses this strategy during meals.   Clear vocal quality maintained.       No signs of respiratory distress or mucosal irritation present following completion of this intervention.      No discomfort observed or reported following removal of Vital Stim unit.       ASSESSMENT:  Making progress toward meeting therapy goals     PLAN OF CARE:     Will continue speech pathology intervention as per established Plan of Care:    Patient will improve oropharyngeal swallow function to ensure airway protection during PO intake to maintain adequate nutrition/hydration and decrease signs/symptoms of aspiration to less than 1 x/day. 1. Vital stimulation treatment to target the following placements/muscle groups/muscle response secondary to the patients symptoms of premature spillage, penetration/aspiration, pharyngeal weakness, residuals, and penetration:     A. Placement 2B to increase function to the laryngeal extrinsics and suprahyoid muscles to improve hyolaryngeal excursion. B. Placement 3A to increase function to the digastric and thyrohyoid muscles to improve hyolaryngeal excursion. C. Placement 3B to increase function to the superior/middle/inferior pharyngeal constrictors and the pharyngeal shortening muscles to improve pharyngeal constriction.         2. Provide Deep Pharyngeal Neuromuscular Stimulation (DPNS) with focus on the following goals:       A. Provide increased sensory input to light pressure receptors and increase cutaneous pressure to posterior tongue for timely initiation of pharyngeal swallow (CN V, VII, IX)      B. Elicit reflexive motor response to improve integrity of velar elevation, tongue base retraction, hyolaryngeal excursion, and pharyngeal constrictor movement (CN V, VII, X, XII)        3. Patient will complete Shaker and/or Chin Tuck Against Resistance (CTAR) to improve UES function to reduce pharyngeal residuals and reduce risk of penetration/aspiration with moderate verbal prompts.      4. Patient will complete oral motor strength/coordination exercises to improve bolus prep/control and mastication with moderate verbal prompts.     5.  Patient will complete BOTR strength/ ROM exercises to reduce pharyngeal residuals and improve epiglottic inversion with  moderate verbal prompts.      6. Patient will complete laryngeal strength/ ROM therapeutic exercises to improve airway protection for the least restrictive PO diet with  moderate verbal prompts      7. Patient will tolerate the least restrictive PO diet to maintain adequate nutrition/ hydration via use of safe swallow/ compensatory strategies with  minimal verbal prompts and no more than 1 overt sign or symptoms of penetration or aspiration.      8. Instruction regarding appropriate implementation of compensatory strategies to improve integrity of swallow function during PO intake.     9. Patient  will complete Effortful Swallow therapeutically to target increased oral and base of tongue pressure, increased pharyngeal constrictor contractions, and increased UES relaxation duration to reduce pharyngeal residue with moderate verbal prompts      10. Patient will complete Daniela Maneuver therapeutically to target increased pharyngeal constrictor contractions to reduce pharyngeal residue with moderate verbal prompts            11.  Repeat swallow study 8-12 weeks post initiation of therapy or as deemed appropriate by the managing physician.  Chetna Valerio [id number]  4/13/2022        CPT CODE:       65430  dysphagia tx

## 2022-04-20 ENCOUNTER — HOSPITAL ENCOUNTER (OUTPATIENT)
Dept: SPEECH THERAPY | Age: 87
Setting detail: THERAPIES SERIES
Discharge: HOME | End: 2022-04-20
Payer: MEDICARE

## 2022-04-20 PROCEDURE — 92526 ORAL FUNCTION THERAPY: CPT

## 2022-04-20 NOTE — PROGRESS NOTES
[address] [address]  Outpatient Speech Therapy  Phone: [phone] Fax: [phone]       SPEECH LANGUAGE PATHOLOGY  DAILY PROGRESS NOTE      SUBJECTIVE:    Patient seen for 60 minute speech therapy session to target dysphagia.  He was pleasant and cooperative.      OBJECTIVE:     He is currently consuming a diet of soft and bite size consistency solids (IDDSI level 6) with honey consistency (moderately thick - IDDSI level 3)  liquids with use of multiple swallow, effortful swallow, small bites/sips, alternate solids and liquids, throat clear and no straw. He reported good tolerance during meals over the past week. He uses all compensatory strategies. He has an appointment next Wednesday 4/27/22 for lower dental implants. SLP reviewed his home exercise program consisting of laryngeal and tongue base strengthening exercises. He reports daily completion. Reviewed the purpose and procedure of Vital Stimulation. Patient provided verbal consent to treat. Vital Stim placement Placement 2B to increase function to the laryngeal extrinsics and suprahyoid muscles to improve hyolaryngeal excursion. Top channel initially set at 8.0mA and bottom channel initially set at 8.0mA to evoke a transition from a tingling sensation to a pulling sensation. An effortful swallow noted. During the session, top channel increased to 16. 0mA and bottom channel increased to 16. 0mA to maintain a pulling sensation and audible swallow.  Patient alerted SLP when he did not feel the machine as strong.       While Vital Stim unit was in place, patient completed Deep Pharyngeal Neuromuscular Stimulation (30 minutes), lollipop trials, and po trials to maintain continuous swallowing during a functionally based exercise.       Continued Deep Pharyngeal Neuromuscular Stimulation (DPNS) with focus on the following goals:      1.  Provide increased sensory input to light pressure receptors and increase cutaneous pressure to posterior tongue for timely initiation of pharyngeal swallow (CN V, VII, IX)     2. Elicit reflexive motor response to improve integrity of velar elevation, tongue base retraction, hyolaryngeal excursion, and pharyngeal constrictor movement (CN V, VII, X, XII)        Results -- percentage indicates elicitation of desired motor response in targeted area     Velar elevation: 100%     Tongue base retraction: 70%      Superior pharyngeal constrictor wall contraction: 100%     Lateral pharyngeal wall contraction: 100%     Physiologic modified gag response: absent     Lingual groove: fair (improving)     Laryngeal elevation/depression time: Timely     Spontaneous elicitation of reflexive swallow post stimulation: 100%- he independently used multiple swallows     Patient tolerated 60 stimulations this session utilizing frozen/cold lemon glycerine swabs, and demonstrated good therapeutic response to intervention. SLP paced the DPNS to ensure he is not fatiguing.       During lollipop and applesauce trials, SLP cued a small lick/bite and an immediate effortful swallow to maintain continuous swallowing with the Vital Stim unit in place. Guille Anderson presented with inconsistent throat clearing and some coughing, but this appears to be habitual because he uses this strategy during meals. Clear vocal quality maintained.       No signs of respiratory distress or mucosal irritation present following completion of this intervention.      No discomfort observed or reported following removal of Vital Stim unit.       ASSESSMENT:  Making progress toward meeting therapy goals     PLAN OF CARE:     Will continue speech pathology intervention as per established Plan of Care:    Patient will improve oropharyngeal swallow function to ensure airway protection during PO intake to maintain adequate nutrition/hydration and decrease signs/symptoms of aspiration to less than 1 x/day.        1. Vital stimulation treatment to target the following placements/muscle groups/muscle response secondary to the patients symptoms of premature spillage, penetration/aspiration, pharyngeal weakness, residuals, and penetration:     A. Placement 2B to increase function to the laryngeal extrinsics and suprahyoid muscles to improve hyolaryngeal excursion. B. Placement 3A to increase function to the digastric and thyrohyoid muscles to improve hyolaryngeal excursion. C. Placement 3B to increase function to the superior/middle/inferior pharyngeal constrictors and the pharyngeal shortening muscles to improve pharyngeal constriction.         2. Provide Deep Pharyngeal Neuromuscular Stimulation (DPNS) with focus on the following goals:       A. Provide increased sensory input to light pressure receptors and increase cutaneous pressure to posterior tongue for timely initiation of pharyngeal swallow (CN V, VII, IX)      B. Elicit reflexive motor response to improve integrity of velar elevation, tongue base retraction, hyolaryngeal excursion, and pharyngeal constrictor movement (CN V, VII, X, XII)        3. Patient will complete Shaker and/or Chin Tuck Against Resistance (CTAR) to improve UES function to reduce pharyngeal residuals and reduce risk of penetration/aspiration with moderate verbal prompts.      4. Patient will complete oral motor strength/coordination exercises to improve bolus prep/control and mastication with moderate verbal prompts.     5.  Patient will complete BOTR strength/ ROM exercises to reduce pharyngeal residuals and improve epiglottic inversion with  moderate verbal prompts.      6. Patient will complete laryngeal strength/ ROM therapeutic exercises to improve airway protection for the least restrictive PO diet with  moderate verbal prompts      7. Patient will tolerate the least restrictive PO diet to maintain adequate nutrition/ hydration via use of safe swallow/ compensatory strategies with  minimal verbal prompts and no more than 1 overt sign or symptoms of penetration or aspiration.      8. Instruction regarding appropriate implementation of compensatory strategies to improve integrity of swallow function during PO intake.     9. Patient  will complete Effortful Swallow therapeutically to target increased oral and base of tongue pressure, increased pharyngeal constrictor contractions, and increased UES relaxation duration to reduce pharyngeal residue with moderate verbal prompts      10. Patient will complete Daniela Maneuver therapeutically to target increased pharyngeal constrictor contractions to reduce pharyngeal residue with moderate verbal prompts            11.  Repeat swallow study 8-12 weeks post initiation of therapy or as deemed appropriate by the managing physician.  Dane Terry M.S. [address]  4/20/2022        CPT CODE:       36218  dysphagia tx

## 2022-05-04 ENCOUNTER — HOSPITAL ENCOUNTER (OUTPATIENT)
Dept: SPEECH THERAPY | Age: 87
Setting detail: THERAPIES SERIES
Discharge: HOME | End: 2022-05-04
Payer: MEDICARE

## 2022-05-04 PROCEDURE — 92526 ORAL FUNCTION THERAPY: CPT

## 2022-05-04 NOTE — PROGRESS NOTES
[address]  Outpatient Speech Therapy  Phone: [phone] Fax: [phone]       SPEECH LANGUAGE PATHOLOGY  DAILY PROGRESS NOTE      SUBJECTIVE:    Patient seen for 60 minute speech therapy session to target dysphagia.  He was pleasant and cooperative.      OBJECTIVE:     He is currently consuming a diet of soft and bite size consistency solids (IDDSI level 6) with honey consistency (moderately thick - IDDSI level 3)  liquids with use of multiple swallow, effortful swallow, small bites/sips, alternate solids and liquids, throat clear and no straw. He reported good tolerance during meals over the past week. He uses all compensatory strategies. He had an appointment 4/27/22 to start the process for lower dental implants. SLP reviewed his home exercise program consisting of laryngeal and tongue base strengthening exercises. He reports daily completion. Reviewed the purpose and procedure of Vital Stimulation. Patient provided verbal consent to treat. Vital Stim placement Placement 2B to increase function to the laryngeal extrinsics and suprahyoid muscles to improve hyolaryngeal excursion. Top channel initially set at 10.0mA and bottom channel initially set at 10.0mA to evoke a transition from a tingling sensation to a pulling sensation. An effortful swallow noted. During the session, top channel increased to 18. 0mA and bottom channel increased [id number]. 0mA to maintain a pulling sensation and audible swallow.  Patient alerted SLP when he did not feel the machine as strong.       While Vital Stim unit was in place, patient completed Deep Pharyngeal Neuromuscular Stimulation (30 minutes) and ice chip trials to maintain continuous swallowing during a functionally based exercise.       Continued Deep Pharyngeal Neuromuscular Stimulation (DPNS) with focus on the following goals:      1.  Provide increased sensory input to light pressure receptors and increase cutaneous pressure to posterior tongue for timely initiation of pharyngeal swallow (CN V, VII, IX)     2. Elicit reflexive motor response to improve integrity of velar elevation, tongue base retraction, hyolaryngeal excursion, and pharyngeal constrictor movement (CN V, VII, X, XII)        Results -- percentage indicates elicitation of desired motor response in targeted area     Velar elevation: 100%     Tongue base retraction: 70%      Superior pharyngeal constrictor wall contraction: 100%     Lateral pharyngeal wall contraction: 100%     Physiologic modified gag response: absent     Lingual groove: fair (improving)     Laryngeal elevation/depression time: Timely     Spontaneous elicitation of reflexive swallow post stimulation: 100%- he independently used multiple swallows     Patient tolerated 60 stimulations this session utilizing frozen/cold lemon glycerine swabs, and demonstrated good therapeutic response to intervention. SLP paced the DPNS to ensure he is not fatiguing.       During ice chip trials, SLP cued an immediate effortful swallow to maintain continuous swallowing with the Vital Stim unit in place. [facility] presented with inconsistent throat clearing and some coughing, but this appears to be habitual because he uses this strategy during meals. Clear vocal quality maintained. No excessive coughing. Patient appears to be an appropriate candidate for the [place]. SLP provided written instructions for him (wait an hour after meals, clean his mouth out, etc) and he will start some limited ice chip trials only one time a day.   He was instructed to continue proactively coughing/throat clearing.         No signs of respiratory distress or mucosal irritation present following completion of this intervention.      No discomfort observed or reported following removal of Vital Stim unit.       ASSESSMENT:  Making progress toward meeting therapy goals     PLAN OF CARE:     Will continue speech pathology intervention as per established Plan of Care:    Patient will improve oropharyngeal swallow function to ensure airway protection during PO intake to maintain adequate nutrition/hydration and decrease signs/symptoms of aspiration to less than 1 x/day. 1. Vital stimulation treatment to target the following placements/muscle groups/muscle response secondary to the patients symptoms of premature spillage, penetration/aspiration, pharyngeal weakness, residuals, and penetration:     A. Placement 2B to increase function to the laryngeal extrinsics and suprahyoid muscles to improve hyolaryngeal excursion. B. Placement 3A to increase function to the digastric and thyrohyoid muscles to improve hyolaryngeal excursion. C. Placement 3B to increase function to the superior/middle/inferior pharyngeal constrictors and the pharyngeal shortening muscles to improve pharyngeal constriction.         2. Provide Deep Pharyngeal Neuromuscular Stimulation (DPNS) with focus on the following goals:       A. Provide increased sensory input to light pressure receptors and increase cutaneous pressure to posterior tongue for timely initiation of pharyngeal swallow (CN V, VII, IX)      B. Elicit reflexive motor response to improve integrity of velar elevation, tongue base retraction, hyolaryngeal excursion, and pharyngeal constrictor movement (CN V, VII, X, XII)        3. Patient will complete Shaker and/or Chin Tuck Against Resistance (CTAR) to improve UES function to reduce pharyngeal residuals and reduce risk of penetration/aspiration with moderate verbal prompts.      4. Patient will complete oral motor strength/coordination exercises to improve bolus prep/control and mastication with moderate verbal prompts.     5.  Patient will complete BOTR strength/ ROM exercises to reduce pharyngeal residuals and improve epiglottic inversion with  moderate verbal prompts.      6. Patient will complete laryngeal strength/ ROM therapeutic exercises to improve airway protection for the least restrictive PO diet with  moderate verbal prompts      7. Patient will tolerate the least restrictive PO diet to maintain adequate nutrition/ hydration via use of safe swallow/ compensatory strategies with  minimal verbal prompts and no more than 1 overt sign or symptoms of penetration or aspiration.      8. Instruction regarding appropriate implementation of compensatory strategies to improve integrity of swallow function during PO intake.     9. Patient  will complete Effortful Swallow therapeutically to target increased oral and base of tongue pressure, increased pharyngeal constrictor contractions, and increased UES relaxation duration to reduce pharyngeal residue with moderate verbal prompts      10. Patient will complete Daniela Maneuver therapeutically to target increased pharyngeal constrictor contractions to reduce pharyngeal residue with moderate verbal prompts            11.  Repeat swallow study 8-12 weeks post initiation of therapy or as deemed appropriate by the managing physician.  Jeremias Peng M.S. CCC-SLP  SP [id number]  5/4/2022        CPT CODE:       05930  dysphagia tx

## 2022-05-11 ENCOUNTER — HOSPITAL ENCOUNTER (OUTPATIENT)
Dept: SPEECH THERAPY | Age: 87
Setting detail: THERAPIES SERIES
Discharge: HOME | End: 2022-05-11
Payer: MEDICARE

## 2022-05-11 PROCEDURE — 92526 ORAL FUNCTION THERAPY: CPT

## 2022-05-11 NOTE — PROGRESS NOTES
[address] [address]  Outpatient Speech Therapy  Phone: [phone] Fax: [phone]       SPEECH LANGUAGE PATHOLOGY  DAILY PROGRESS NOTE      SUBJECTIVE:    Patient seen for 60 minute speech therapy session to target dysphagia.  He was pleasant and cooperative.      OBJECTIVE:     He is currently consuming a diet of soft and bite size consistency solids (IDDSI level 6) with honey consistency (moderately thick - IDDSI level 3)  liquids with use of multiple swallow, effortful swallow, small bites/sips, alternate solids and liquids, throat clear and no straw. He reported good tolerance during meals over the past week. He uses all compensatory strategies. He is in the process of obtaining lower dental implants. SLP reviewed his home exercise program consisting of laryngeal and tongue base strengthening exercises. He reports daily completion. Reviewed the purpose and procedure of Vital Stimulation. Patient provided verbal consent to treat. Vital Stim placement Placement 2B to increase function to the laryngeal extrinsics and suprahyoid muscles to improve hyolaryngeal excursion. Top channel initially set at 10.0mA and bottom channel initially set at 10.0mA to evoke a transition from a tingling sensation to a pulling sensation. An effortful swallow noted. During the session, top channel increased to 13. 0mA and bottom channel increased to 25. 0mA to maintain a pulling sensation and audible swallow.  Patient alerted SLP when he did not feel the machine as strong.       While Vital Stim unit was in place, patient completed Deep Pharyngeal Neuromuscular Stimulation (30 minutes) and ice chip trials to maintain continuous swallowing during a functionally based exercise.       Continued Deep Pharyngeal Neuromuscular Stimulation (DPNS) with focus on the following goals:      1.  Provide increased sensory input to light pressure receptors and increase cutaneous pressure to posterior tongue for timely initiation of pharyngeal swallow (CN V, VII, IX)     2. Elicit reflexive motor response to improve integrity of velar elevation, tongue base retraction, hyolaryngeal excursion, and pharyngeal constrictor movement (CN V, VII, X, XII)        Results -- percentage indicates elicitation of desired motor response in targeted area     Velar elevation: 100%     Tongue base retraction: 70%      Superior pharyngeal constrictor wall contraction: 100%     Lateral pharyngeal wall contraction: 100%     Physiologic modified gag response: absent     Lingual groove: fair (improving)     Laryngeal elevation/depression time: Timely     Spontaneous elicitation of reflexive swallow post stimulation: 100%- he independently used multiple swallows     Patient tolerated 60 stimulations this session utilizing frozen/cold lemon glycerine swabs, and demonstrated good therapeutic response to intervention. SLP paced the DPNS to ensure he is not fatiguing.       During ice chip trials, SLP cued an immediate effortful swallow to maintain continuous swallowing with the Vital Stim unit in place. Jenny Hsu presented with inconsistent throat clearing and some coughing, but this appears to be habitual because he uses this strategy during meals. Clear vocal quality maintained. No excessive coughing. Patient is an appropriate candidate for the [place]. SLP provided written instructions for him (wait an hour after meals, clean his mouth out, etc) and he will start some limited ice chip trials only one time a day.   He was instructed to continue proactively coughing/throat clearing.         No signs of respiratory distress or mucosal irritation present following completion of this intervention.      No discomfort observed or reported following removal of Vital Stim unit.       ASSESSMENT:  Making progress toward meeting therapy goals     PLAN OF CARE:     Will continue speech pathology intervention as per established Plan of Care:    Patient will improve oropharyngeal swallow function to ensure airway protection during PO intake to maintain adequate nutrition/hydration and decrease signs/symptoms of aspiration to less than 1 x/day. 1. Vital stimulation treatment to target the following placements/muscle groups/muscle response secondary to the patients symptoms of premature spillage, penetration/aspiration, pharyngeal weakness, residuals, and penetration:     A. Placement 2B to increase function to the laryngeal extrinsics and suprahyoid muscles to improve hyolaryngeal excursion. B. Placement 3A to increase function to the digastric and thyrohyoid muscles to improve hyolaryngeal excursion. C. Placement 3B to increase function to the superior/middle/inferior pharyngeal constrictors and the pharyngeal shortening muscles to improve pharyngeal constriction.         2. Provide Deep Pharyngeal Neuromuscular Stimulation (DPNS) with focus on the following goals:       A. Provide increased sensory input to light pressure receptors and increase cutaneous pressure to posterior tongue for timely initiation of pharyngeal swallow (CN V, VII, IX)      B. Elicit reflexive motor response to improve integrity of velar elevation, tongue base retraction, hyolaryngeal excursion, and pharyngeal constrictor movement (CN V, VII, X, XII)        3. Patient will complete Shaker and/or Chin Tuck Against Resistance (CTAR) to improve UES function to reduce pharyngeal residuals and reduce risk of penetration/aspiration with moderate verbal prompts.      4. Patient will complete oral motor strength/coordination exercises to improve bolus prep/control and mastication with moderate verbal prompts.     5.  Patient will complete BOTR strength/ ROM exercises to reduce pharyngeal residuals and improve epiglottic inversion with  moderate verbal prompts.      6. Patient will complete laryngeal strength/ ROM therapeutic exercises to improve airway protection for the least restrictive PO diet with  moderate verbal prompts      7. Patient will tolerate the least restrictive PO diet to maintain adequate nutrition/ hydration via use of safe swallow/ compensatory strategies with  minimal verbal prompts and no more than 1 overt sign or symptoms of penetration or aspiration.      8. Instruction regarding appropriate implementation of compensatory strategies to improve integrity of swallow function during PO intake.     9. Patient  will complete Effortful Swallow therapeutically to target increased oral and base of tongue pressure, increased pharyngeal constrictor contractions, and increased UES relaxation duration to reduce pharyngeal residue with moderate verbal prompts      10. Patient will complete Daniela Maneuver therapeutically to target increased pharyngeal constrictor contractions to reduce pharyngeal residue with moderate verbal prompts            11.  Repeat swallow study 8-12 weeks post initiation of therapy or as deemed appropriate by the managing physician.  Elizabeth Kaplan M.S. [address]  5/11/2022        CPT CODE:       75080  dysphagia tx

## 2022-05-18 ENCOUNTER — HOSPITAL ENCOUNTER (OUTPATIENT)
Dept: SPEECH THERAPY | Age: 87
Setting detail: THERAPIES SERIES
Discharge: HOME | End: 2022-05-18
Payer: MEDICARE

## 2022-05-18 PROCEDURE — 92526 ORAL FUNCTION THERAPY: CPT

## 2022-05-18 NOTE — PROGRESS NOTES
[address]  Outpatient Speech Therapy  Phone: [phone] Fax: [phone]       SPEECH LANGUAGE PATHOLOGY  DAILY PROGRESS NOTE      SUBJECTIVE:    Patient seen for 60 minute speech therapy session to target dysphagia.  He was pleasant and cooperative.      OBJECTIVE:     He is currently consuming a diet of soft and bite size consistency solids (IDDSI level 6) with honey consistency (moderately thick - IDDSI level 3)  liquids with use of multiple swallow, effortful swallow, small bites/sips, alternate solids and liquids, throat clear and no straw. He reported good tolerance during meals over the past week. He uses all compensatory strategies. He is in the process of obtaining lower dental implants. SLP reviewed his home exercise program consisting of laryngeal and tongue base strengthening exercises. He reports daily completion. Reviewed the purpose and procedure of Vital Stimulation. Patient provided verbal consent to treat. Vital Stim placement Placement 2B to increase function to the laryngeal extrinsics and suprahyoid muscles to improve hyolaryngeal excursion. Top channel initially set at 12. 0mA and bottom channel initially set at 12. 0mA to evoke a transition from a tingling sensation to a pulling sensation. An effortful swallow noted. During the session, top channel increased to 22. 0mA and bottom channel increased to 25. 0mA to maintain a pulling sensation and audible swallow.  Patient alerted SLP when he did not feel the machine as strong.       While Vital Stim unit was in place, patient completed Deep Pharyngeal Neuromuscular Stimulation (30 minutes) and ice chip trials to maintain continuous swallowing during a functionally based exercise.       Continued Deep Pharyngeal Neuromuscular Stimulation (DPNS) with focus on the following goals:      1.  Provide increased sensory input to light pressure receptors and increase cutaneous pressure to posterior tongue for timely initiation of pharyngeal swallow (CN V, VII, IX)     2. Elicit reflexive motor response to improve integrity of velar elevation, tongue base retraction, hyolaryngeal excursion, and pharyngeal constrictor movement (CN V, VII, X, XII)        Results -- percentage indicates elicitation of desired motor response in targeted area     Velar elevation: 90%     Tongue base retraction: 70%      Superior pharyngeal constrictor wall contraction: 90%     Lateral pharyngeal wall contraction: 90%     Physiologic modified gag response: absent     Lingual groove: fair (improving)     Laryngeal elevation/depression time: Timely     Spontaneous elicitation of reflexive swallow post stimulation: 100%, but more labored toward the end of DPNS     Patient tolerated 48 stimulations this session utilizing frozen/cold lemon glycerine swabs, and demonstrated good therapeutic response to intervention. SLP paced the DPNS to ensure he is not fatiguing.       During ice chip trials, SLP cued an immediate effortful swallow to maintain continuous swallowing with the Vital Stim unit in place. Johnnie Serna presented with inconsistent throat clearing and some coughing, but this appears to be habitual because he uses this strategy during meals. Clear vocal quality maintained. No excessive coughing. Patient is an appropriate candidate for the [place]. SLP provided written instructions for him (these were discussed last week, but he forgot) and he will start some limited ice chip/water trials during the day.   He was instructed to continue proactively coughing/throat clearing.         No signs of respiratory distress or mucosal irritation present following completion of this intervention.      No discomfort observed or reported following removal of Vital Stim unit.       ASSESSMENT:  Making progress toward meeting therapy goals     PLAN OF CARE:     Will continue speech pathology intervention as per established Plan of Care:    Patient will improve oropharyngeal swallow function to ensure airway protection during PO intake to maintain adequate nutrition/hydration and decrease signs/symptoms of aspiration to less than 1 x/day. 1. Vital stimulation treatment to target the following placements/muscle groups/muscle response secondary to the patients symptoms of premature spillage, penetration/aspiration, pharyngeal weakness, residuals, and penetration:     A. Placement 2B to increase function to the laryngeal extrinsics and suprahyoid muscles to improve hyolaryngeal excursion. B. Placement 3A to increase function to the digastric and thyrohyoid muscles to improve hyolaryngeal excursion. C. Placement 3B to increase function to the superior/middle/inferior pharyngeal constrictors and the pharyngeal shortening muscles to improve pharyngeal constriction.         2. Provide Deep Pharyngeal Neuromuscular Stimulation (DPNS) with focus on the following goals:       A. Provide increased sensory input to light pressure receptors and increase cutaneous pressure to posterior tongue for timely initiation of pharyngeal swallow (CN V, VII, IX)      B. Elicit reflexive motor response to improve integrity of velar elevation, tongue base retraction, hyolaryngeal excursion, and pharyngeal constrictor movement (CN V, VII, X, XII)        3. Patient will complete Shaker and/or Chin Tuck Against Resistance (CTAR) to improve UES function to reduce pharyngeal residuals and reduce risk of penetration/aspiration with moderate verbal prompts.      4. Patient will complete oral motor strength/coordination exercises to improve bolus prep/control and mastication with moderate verbal prompts.     5.  Patient will complete BOTR strength/ ROM exercises to reduce pharyngeal residuals and improve epiglottic inversion with  moderate verbal prompts.      6. Patient will complete laryngeal strength/ ROM therapeutic exercises to improve airway protection for the least restrictive PO diet with  moderate verbal prompts      7. Patient will tolerate the least restrictive PO diet to maintain adequate nutrition/ hydration via use of safe swallow/ compensatory strategies with  minimal verbal prompts and no more than 1 overt sign or symptoms of penetration or aspiration.      8. Instruction regarding appropriate implementation of compensatory strategies to improve integrity of swallow function during PO intake.     9. Patient  will complete Effortful Swallow therapeutically to target increased oral and base of tongue pressure, increased pharyngeal constrictor contractions, and increased UES relaxation duration to reduce pharyngeal residue with moderate verbal prompts      10. Patient will complete Daniela Maneuver therapeutically to target increased pharyngeal constrictor contractions to reduce pharyngeal residue with moderate verbal prompts            11.  Repeat swallow study 8-12 weeks post initiation of therapy or as deemed appropriate by the managing physician.  Karen Mack M.S. [address] [id number]  5/18/2022        CPT CODE:       38352  dysphagia tx

## 2022-05-25 ENCOUNTER — HOSPITAL ENCOUNTER (OUTPATIENT)
Dept: ULTRASOUND IMAGING | Age: 87
Discharge: HOME | End: 2022-05-25
Payer: MEDICARE

## 2022-05-25 ENCOUNTER — HOSPITAL ENCOUNTER (OUTPATIENT)
Dept: SPEECH THERAPY | Age: 87
Setting detail: THERAPIES SERIES
Discharge: HOME | End: 2022-05-25
Payer: MEDICARE

## 2022-05-25 DIAGNOSIS — R60.9 EDEMA, UNSPECIFIED TYPE: ICD-10-CM

## 2022-05-25 DIAGNOSIS — I73.9 PERIPHERAL VASCULAR DISEASE, UNSPECIFIED (HCC): ICD-10-CM

## 2022-05-25 DIAGNOSIS — G62.9 POLYNEUROPATHY: ICD-10-CM

## 2022-05-25 DIAGNOSIS — M79.606 PAIN OF LOWER EXTREMITY, UNSPECIFIED LATERALITY: ICD-10-CM

## 2022-05-25 PROCEDURE — 93925 LOWER EXTREMITY STUDY: CPT

## 2022-05-25 NOTE — PROGRESS NOTES
Speech-Language Pathology  Cancellation/No Show Note      For today's appointment patient:    [x]  Cancelled                  []  Rescheduled appointment    []  No show       []  Therapist cancelled             Reason given by patient:  []  No reason given  []  Conflicting appointment  []  No transportation  []  Conflict with work  []  Illness  []  Inclement weather   []  Insurance related issues  []  Other           Comments: Having dental work done     Continue as per established [address] [place] [address].S. [address]    5/25/2022

## 2022-06-01 ENCOUNTER — HOSPITAL ENCOUNTER (OUTPATIENT)
Dept: SPEECH THERAPY | Age: 87
Setting detail: THERAPIES SERIES
Discharge: HOME | End: 2022-06-01
Payer: MEDICARE

## 2022-06-01 PROCEDURE — 92526 ORAL FUNCTION THERAPY: CPT

## 2022-06-01 NOTE — PROGRESS NOTES
[address]  Outpatient Speech Therapy  Phone: [phone] Fax: [phone]       SPEECH LANGUAGE PATHOLOGY  DAILY PROGRESS NOTE      SUBJECTIVE:    Patient seen for 60 minute speech therapy session to target dysphagia.  He was pleasant and cooperative.      OBJECTIVE:     He is currently consuming a diet of soft and bite size consistency solids (IDDSI level 6) with honey consistency (moderately thick - IDDSI level 3)  liquids with use of multiple swallow, effortful swallow, small bites/sips, alternate solids and liquids, throat clear and no straw. He reported good tolerance during meals over the past week. He uses all compensatory strategies. He did report that he went to his [employer] house for a cookout on Memorial Day and he forgot his thickener. He was able to eat and drink without reported difficulty. He did not report excessive coughing. When asking about his overall coughing he said \"I don't think i'm doing that as much\". SLP reviewed his home exercise program consisting of laryngeal and tongue base strengthening exercises. He reports daily completion. Reviewed the purpose and procedure of Vital Stimulation. Patient provided verbal consent to treat. Vital Stim placement Placement 2B to increase function to the laryngeal extrinsics and suprahyoid muscles to improve hyolaryngeal excursion. Top channel initially set at 6.0mA and bottom channel initially set at 6.0mA to evoke a transition from a tingling sensation to a pulling sensation. An effortful swallow noted. During the session, top channel increased to 15. 0mA and bottom channel increased [id number]. 0mA to maintain a pulling sensation and audible swallow.  Patient alerted SLP when he did not feel the machine as strong.       While Vital Stim unit was in place, patient completed Deep Pharyngeal Neuromuscular Stimulation (30 minutes) and ice chip trials to maintain continuous swallowing during a functionally based exercise.       Continued Deep Pharyngeal Neuromuscular Stimulation (DPNS) with focus on the following goals:      1. Provide increased sensory input to light pressure receptors and increase cutaneous pressure to posterior tongue for timely initiation of pharyngeal swallow (CN V, VII, IX)     2. Elicit reflexive motor response to improve integrity of velar elevation, tongue base retraction, hyolaryngeal excursion, and pharyngeal constrictor movement (CN V, VII, X, XII)        Results -- percentage indicates elicitation of desired motor response in targeted area     Velar elevation: 90%     Tongue base retraction: 80%      Superior pharyngeal constrictor wall contraction: 90%     Lateral pharyngeal wall contraction: 90%     Physiologic modified gag response: absent     Lingual groove: fair (improving)     Laryngeal elevation/depression time: Timely     Spontaneous elicitation of reflexive swallow post stimulation: 90%, but more labored toward the end of DPNS     Patient tolerated 48 stimulations this session utilizing frozen/cold lemon glycerine swabs, and demonstrated good therapeutic response to intervention. SLP paced the DPNS to ensure he is not fatiguing.       During ice chip trials, SLP cued an immediate effortful swallow to maintain continuous swallowing with the Vital Stim unit in place. Kiko Chan presented with inconsistent throat clearing and some coughing, but this appears to be habitual because he uses this strategy during meals. Clear vocal quality maintained. No excessive coughing. Patient is an appropriate candidate for the [place]. He has been incorporating some limited ice chip/water trials during the day following oral care.   He was instructed to continue proactively coughing/throat clearing.         No signs of respiratory distress or mucosal irritation present following completion of this intervention.      No discomfort observed or reported following removal of Vital Stim unit.       ASSESSMENT:  Making progress toward meeting therapy goals     PLAN OF CARE:     Will continue speech pathology intervention as per established Plan of Care:    Patient will improve oropharyngeal swallow function to ensure airway protection during PO intake to maintain adequate nutrition/hydration and decrease signs/symptoms of aspiration to less than 1 x/day. 1. Vital stimulation treatment to target the following placements/muscle groups/muscle response secondary to the patients symptoms of premature spillage, penetration/aspiration, pharyngeal weakness, residuals, and penetration:     A. Placement 2B to increase function to the laryngeal extrinsics and suprahyoid muscles to improve hyolaryngeal excursion. B. Placement 3A to increase function to the digastric and thyrohyoid muscles to improve hyolaryngeal excursion. C. Placement 3B to increase function to the superior/middle/inferior pharyngeal constrictors and the pharyngeal shortening muscles to improve pharyngeal constriction.         2. Provide Deep Pharyngeal Neuromuscular Stimulation (DPNS) with focus on the following goals:       A. Provide increased sensory input to light pressure receptors and increase cutaneous pressure to posterior tongue for timely initiation of pharyngeal swallow (CN V, VII, IX)      B. Elicit reflexive motor response to improve integrity of velar elevation, tongue base retraction, hyolaryngeal excursion, and pharyngeal constrictor movement (CN V, VII, X, XII)        3. Patient will complete Shaker and/or Chin Tuck Against Resistance (CTAR) to improve UES function to reduce pharyngeal residuals and reduce risk of penetration/aspiration with moderate verbal prompts.      4. Patient will complete oral motor strength/coordination exercises to improve bolus prep/control and mastication with moderate verbal prompts.     5.  Patient will complete BOTR strength/ ROM exercises to reduce pharyngeal residuals and improve epiglottic inversion with  moderate verbal prompts.      6. Patient will complete laryngeal strength/ ROM therapeutic exercises to improve airway protection for the least restrictive PO diet with  moderate verbal prompts      7. Patient will tolerate the least restrictive PO diet to maintain adequate nutrition/ hydration via use of safe swallow/ compensatory strategies with  minimal verbal prompts and no more than 1 overt sign or symptoms of penetration or aspiration.      8. Instruction regarding appropriate implementation of compensatory strategies to improve integrity of swallow function during PO intake.     9. Patient  will complete Effortful Swallow therapeutically to target increased oral and base of tongue pressure, increased pharyngeal constrictor contractions, and increased UES relaxation duration to reduce pharyngeal residue with moderate verbal prompts      10. Patient will complete Daniela Maneuver therapeutically to target increased pharyngeal constrictor contractions to reduce pharyngeal residue with moderate verbal prompts            11.  Repeat swallow study 8-12 weeks post initiation of therapy or as deemed appropriate by the managing physician.  Rik Dakins M.S. [address]  6/1/2022        CPT CODE:       12681  dysphagia tx

## 2022-06-08 ENCOUNTER — HOSPITAL ENCOUNTER (OUTPATIENT)
Dept: SPEECH THERAPY | Age: 87
Setting detail: THERAPIES SERIES
Discharge: HOME | End: 2022-06-08
Payer: MEDICARE

## 2022-06-08 PROCEDURE — 92526 ORAL FUNCTION THERAPY: CPT

## 2022-06-08 NOTE — PROGRESS NOTES
[address] [address]  Outpatient Speech Therapy  Phone: [phone] Fax: [phone]       SPEECH LANGUAGE PATHOLOGY  DAILY PROGRESS NOTE      SUBJECTIVE:    Patient seen for 60 minute speech therapy session to target dysphagia.  He was pleasant and cooperative.      OBJECTIVE:     He is currently consuming a diet of soft and bite size consistency solids (IDDSI level 6) with honey consistency (moderately thick - IDDSI level 3)  liquids with use of multiple swallow, effortful swallow, small bites/sips, alternate solids and liquids, throat clear and no straw. He reported good tolerance during meals over the past week. He uses all compensatory strategies. Patient is an appropriate candidate for the [place]. He has been incorporating some limited ice chip/water trials during the day following oral care. He was instructed to continue proactively coughing/throat clearing. SLP reviewed his home exercise program consisting of laryngeal and tongue base strengthening exercises. He reports daily completion. Reviewed the purpose and procedure of Vital Stimulation. Patient provided verbal consent to treat. Vital Stim placement Placement 2B to increase function to the laryngeal extrinsics and suprahyoid muscles to improve hyolaryngeal excursion. Top channel initially set at 8.0mA and bottom channel initially set at 8.0mA to evoke a transition from a tingling sensation to a pulling sensation. An effortful swallow noted. During the session, top channel increased to 18. 0mA and bottom channel increased to 20. 0mA to maintain a pulling sensation and audible swallow.  Patient alerted SLP when he did not feel the machine as strong.       While Vital Stim unit was in place, patient completed Deep Pharyngeal Neuromuscular Stimulation (30 minutes) and ice chip trials to maintain continuous swallowing during a functionally based exercise.    Continued Deep Pharyngeal Neuromuscular Stimulation (DPNS) with focus on the following goals:      1. Provide increased sensory input to light pressure receptors and increase cutaneous pressure to posterior tongue for timely initiation of pharyngeal swallow (CN V, VII, IX)     2. Elicit reflexive motor response to improve integrity of velar elevation, tongue base retraction, hyolaryngeal excursion, and pharyngeal constrictor movement (CN V, VII, X, XII)        Results -- percentage indicates elicitation of desired motor response in targeted area     Velar elevation: 90%     Tongue base retraction: 60%      Superior pharyngeal constrictor wall contraction: 90%     Lateral pharyngeal wall contraction: 90%     Physiologic modified gag response: absent     Lingual groove: fair (improving)     Laryngeal elevation/depression time: Timely     Spontaneous elicitation of reflexive swallow post stimulation: 90%, but more labored toward the end of DPNS     Patient tolerated 60 stimulations this session utilizing frozen/cold lemon glycerine swabs, and demonstrated good therapeutic response to intervention. SLP paced the DPNS to ensure he is not fatiguing.       During ice chip trials, SLP cued an immediate effortful swallow to maintain continuous swallowing with the Vital Stim unit in place. [facility] presented with inconsistent throat clearing and some coughing, but this appears to be habitual because he uses this strategy during meals. Clear vocal quality maintained. No excessive coughing.      No signs of respiratory distress or mucosal irritation present following completion of this intervention.   His voice was stronger upon exit from the therapy room.       No discomfort observed or reported following removal of Vital Stim unit.       ASSESSMENT:  Making progress toward meeting therapy goals     PLAN OF CARE:     Will continue speech pathology intervention as per established Plan of Care:    Patient will improve oropharyngeal swallow function to ensure airway protection during PO intake to maintain adequate nutrition/hydration and decrease signs/symptoms of aspiration to less than 1 x/day. 1. Vital stimulation treatment to target the following placements/muscle groups/muscle response secondary to the patients symptoms of premature spillage, penetration/aspiration, pharyngeal weakness, residuals, and penetration:     A. Placement 2B to increase function to the laryngeal extrinsics and suprahyoid muscles to improve hyolaryngeal excursion. B. Placement 3A to increase function to the digastric and thyrohyoid muscles to improve hyolaryngeal excursion. C. Placement 3B to increase function to the superior/middle/inferior pharyngeal constrictors and the pharyngeal shortening muscles to improve pharyngeal constriction.         2. Provide Deep Pharyngeal Neuromuscular Stimulation (DPNS) with focus on the following goals:       A. Provide increased sensory input to light pressure receptors and increase cutaneous pressure to posterior tongue for timely initiation of pharyngeal swallow (CN V, VII, IX)      B. Elicit reflexive motor response to improve integrity of velar elevation, tongue base retraction, hyolaryngeal excursion, and pharyngeal constrictor movement (CN V, VII, X, XII)        3. Patient will complete Shaker and/or Chin Tuck Against Resistance (CTAR) to improve UES function to reduce pharyngeal residuals and reduce risk of penetration/aspiration with moderate verbal prompts.      4. Patient will complete oral motor strength/coordination exercises to improve bolus prep/control and mastication with moderate verbal prompts.     5.  Patient will complete BOTR strength/ ROM exercises to reduce pharyngeal residuals and improve epiglottic inversion with  moderate verbal prompts.      6. Patient will complete laryngeal strength/ ROM therapeutic exercises to improve airway protection for the least restrictive PO diet with moderate verbal prompts      7. Patient will tolerate the least restrictive PO diet to maintain adequate nutrition/ hydration via use of safe swallow/ compensatory strategies with  minimal verbal prompts and no more than 1 overt sign or symptoms of penetration or aspiration.      8. Instruction regarding appropriate implementation of compensatory strategies to improve integrity of swallow function during PO intake.     9. Patient  will complete Effortful Swallow therapeutically to target increased oral and base of tongue pressure, increased pharyngeal constrictor contractions, and increased UES relaxation duration to reduce pharyngeal residue with moderate verbal prompts      10. Patient will complete Daniela Maneuver therapeutically to target increased pharyngeal constrictor contractions to reduce pharyngeal residue with moderate verbal prompts            11.  Repeat swallow study 8-12 weeks post initiation of therapy or as deemed appropriate by the managing physician.  Grace Stewart M.S. [address]  6/8/2022        CPT CODE:       13867  dysphagia tx

## 2022-06-15 ENCOUNTER — HOSPITAL ENCOUNTER (OUTPATIENT)
Dept: SPEECH THERAPY | Age: 87
Setting detail: THERAPIES SERIES
Discharge: HOME | End: 2022-06-15
Payer: MEDICARE

## 2022-06-15 PROCEDURE — 92526 ORAL FUNCTION THERAPY: CPT

## 2022-06-21 ENCOUNTER — HOSPITAL ENCOUNTER (OUTPATIENT)
Dept: GENERAL RADIOLOGY | Age: 87
Discharge: HOME | End: 2022-06-23
Payer: MEDICARE

## 2022-06-21 DIAGNOSIS — R13.10 SWALLOWING DISORDER: ICD-10-CM

## 2022-06-21 PROCEDURE — 74230 X-RAY XM SWLNG FUNCJ C+: CPT

## 2022-06-21 PROCEDURE — 92526 ORAL FUNCTION THERAPY: CPT | Performed by: SPEECH-LANGUAGE PATHOLOGIST

## 2022-06-21 PROCEDURE — 2500000003 HC RX 250 WO HCPCS: Performed by: RADIOLOGY

## 2022-06-21 PROCEDURE — 92611 MOTION FLUOROSCOPY/SWALLOW: CPT | Performed by: SPEECH-LANGUAGE PATHOLOGIST

## 2022-06-21 RX ADMIN — BARIUM SULFATE 15 ML: 400 SUSPENSION ORAL at 13:35

## 2022-06-21 RX ADMIN — BARIUM SULFATE 15 ML: 400 PASTE ORAL at 13:34

## 2022-06-21 NOTE — PROGRESS NOTES
SPEECH/LANGUAGE PATHOLOGY  VIDEOFLUOROSCOPIC STUDY OF SWALLOWING (MBS)   and PLAN OF CARE    PATIENT NAME:  Edith Adams  (male)     MRN:  [id number]    :  1931  (80 y.o.)  STATUS:  Outpatient    TODAY'S DATE:  2022  REFERRING PROVIDER:   Darius Hernandez DO   SPECIFIC PROVIDER ORDER: [place] modified barium swallow with video  Date of order:  22   REASON FOR REFERRAL: reassess oropharyngeal swallow function    EVALUATING THERAPIST: Lissette Azevedo SLP      RESULTS:      DYSPHAGIA DIAGNOSIS:  Moderate oral phase, Severe pharyngeal phase dysphagia     DIET RECOMMENDATIONS:  No safe diet texture is able to be recommended following this assessment secondary to aspiration and/or high risk of aspiration. SLP recommend that physician discuss overall goals of care/ wishes with Pt and family to determine overall goals of care. Pt will continue to ne at increased risk of aspiration and its complications going forward. Pt recently treated for pneumonia. Has completed some [facility]y workup at [facility] that was unremarkable.        FEEDING RECOMMENDATIONS:    Assistance level:  Encourage self-feeding     Compensatory strategies recommended: double/ multiple swallow, throat clear/ cough and re-swallow      Discussed recommendations with nursing and/or faxed report to referring provider: Yes    Laryngeal Penetration and Aspiration:  Penetration WITH aspiration was observed in today's study with  moderately thick liquid (honey)    SPEECH THERAPY  PLAN OF CARE   The dysphagia POC is established based on physician order and dysphagia diagnosis    Therapy at the discretion of facility/treating Speech Pathologist       Conditions Requiring Skilled Therapeutic Intervention for dysphagia:    Reduced pharyngeal clearing of the bolus  Reduced laryngeal closure resulting in aspiration   Swallow triggered when bolus head at level of laryngeal surface of epiglottis increasing risk of aspiration    SPECIFIC DYSPHAGIA INTERVENTIONS TO INCLUDE:     Compensatory strategy training   Therapeutic exercises  Trials of upgraded diet/liquid     Specific instructions for next treatment:  Per treating therapist   Treatment Goals:    Short Term Goals:  Pt will implement identified compensatory swallowing strategies on 90% of opportunities or greater to improve airway protection and swallow function. Pt will complete BOTR strength/ ROM exercises to reduce pharyngeal residuals and improve epiglottic inversion moderate verbal prompts  Pt will complete laryngeal strength/ ROM therapeutic exercises to improve airway protection for the least restrictive PO diet moderate verbal prompts  Pt will complete Effortful Swallow therapeutically to target increased oral and base of tongue pressure, increased pharyngeal constrictor contractions, and increased UES relaxation duration to reduce pharyngeal residue with moderate verbal prompts   Pt will complete Daniela Maneuver therapeutically to target increased pharyngeal constrictor contractions to reduce pharyngeal residue with moderate verbal prompts     Long Term Goals:   Pt will improve oropharyngeal swallow function to ensure airway protection during PO intake to maintain adequate nutrition/hydration and decrease signs/symptoms of aspiration to less than 1 x/day.    OTHER RECOMMENDATIONS:  A follow up with neurology may be warranted if within Pt's goals of care     Patient/family Goal:    To be able to eat regular foods and drink regular liquids    Plan of care discussed with Patient and Family   The Patient and Family understand(s) the diagnosis, prognosis and plan of care     Rehabilitation Potential/Prognosis: guarded-- dysphagia appears to be worsening despite ongoing therapy                       ADMITTING DIAGNOSIS: Swallowing disorder [R13.10]     VISIT DIAGNOSIS:   Visit Diagnoses       Codes    Swallowing disorder     R13.10              PATIENT REPORT/COMPLAINT: see above    PRIOR LEVEL OF SWALLOW FUNCTION:    Past History of Dysphagia?:  yes    Home diet: Regular consistency solids (IDDSI level 7) with  honey consistency (moderately thick - IDDSI level 3)  liquids  Current Diet Order:  No diet orders on file    PROCEDURE:  Consistencies Administered During the Evaluation   Liquids: nectar thick liquid and honey thick liquid   Solids:  pureed foods      Method of Intake:   cup, spoon  Self fed      Position:   Standing    INSTRUMENTAL ASSESSMENT:    ORAL PREP/ ORAL PHASE:   Delayed A-P transit due to: decreased ability for initiation    and reduced lingual strength   Decreased bolus formation resulting in observed premature pharyngeal spillage  Piecemeal deglutition     PHARYNGEAL PHASE:     ONSET TIME       Delayed initiation of the pharyngeal swallow was noted with swallow reflex triggered at the level of the vallecula        PHARYNGEAL RESIDUALS        Vallecula/Pharyngeal Wall           Reduced tongue base retraction and SEVERELY impaired epilgottic inversion resulting in residuals in vallecula and/or posterior pharyngeal wall for all consistencies administered which minimally cleared (HTL) and did not clear (PUREE) with spontaneous multiple swallow       Pyriform Sinuses      No significant residuals were noted in the pyriform sinuses     LARYNGEAL PENETRATION   Laryngeal penetration occurred prior to aspiration. Further details under aspiration section. ASPIRATION  Aspiration occurred BEFORE the swallow for honey consistency liquid due to decreased bolus formation and delayed pharyngeal swallow . Aspiration was moderate to severe and occurred consistently . spontaneous throat clearing was noted    Aspiration occurred DURING the swallow for honey consistency liquid due to  delayed laryngeal closure and inadequate laryngeal closure . Aspiration was moderate-severe and occurred consistently . spontaneous throat clearing was noted    Aspiration occurred AFTER the swallow for honey consistency liquid due to pharyngeal residuals . Aspiration was mild-moderate and occurred consistently . spontaneous throat clearing was noted    PENETRATION-ASPIRATION SCALE (PAS):  THIN item not administered  MILDLY THICK item not administered  MODERATELY THICK 6 = Material enters the airway, passes below the vocal folds, and is ejected into the larynx or out of the airway (but repeatedly falls into airway)  PUREE 1 = Material does not enter the airway  HARD SOLID item not administered       COMPENSATORY STRATEGIES    Compensatory strategies that were partially beneficial included Multiple swallow      Compensatory strategies that were not beneficial included Multiple swallow, Effortful swallow and Super-Supraglottic Swallow      STRUCTURAL/FUNCTIONAL ANOMALIES   No significant changes in  function however changes in nasality are notable during speech tasks     CERVICAL ESOPHAGEAL STAGE :     The cervical esophagus appeared adequate          ___________    Cognition:   Within functional limits for this exam    Oral Peripheral Examination   Adequate lingual/labial strength     Current Respiratory Status   room air     Parameters of Speech Production  Respiration:  Adequate for speech production  Quality:   Harsh  Intensity: Loud    Pain: No pain reported. EDUCATION:   The Speech Language Pathologist (SLP) completed education regarding results of evaluation and that intervention is warranted at this time. Learner: Patient and Family  Education: Reviewed results and recommendations of this evaluation, Reviewed diet and strategies, Reviewed signs, symptoms and risks of aspiration, Reviewed recommendations for follow-up, Education Related to Potential Risks and Complications Due to Impairment/Illness/Injury and Education Related to Prevention of Recurrence of Impairment/Illness/Injury  Evaluation of Education:  Verbalizes understanding    This plan may be re-evaluated and revised as warranted.         Evaluation Time includes thorough review of current medical information, gathering information on past medical history/social history and prior level of function, completion of standardized testing/informal observation of tasks, assessment of data and education on plan of care and goals. [x]The admitting diagnosis and active problem list, have been reviewed prior to initiation of this evaluation. CPT Code: 22994  dysphagia study    INTERVENTION  CPT Code: 60822  dysphagia tx    Speech Pathologist (SLP) completed education with the patient/family regarding procedure of Modified Barium Swallow Study prior to exam and then type of swallowing impairment following completion of MBSS. Reviewed current solid/liquid consistency diet recommendations and discussed compensatory strategies to ensure safe PO intake. Images from MBSS reviewed with patient/ family and education provided. Reviewed aspiration precautions. Encouraged patient and/or family to engage SLP in unstructured Q&A session relative to identified deficit areas; indicated understanding of all information provided via satisfactory verbal response.             ACTIVE PROBLEM LIST:   Patient Active Problem List   Diagnosis    Carotid atherosclerosis    Hypertension    Raynaud's disease    Thyroid disease       Azalia Bustos [facility]-SLP  Speech-Language Pathologist  [id number]  6/21/2022

## 2022-06-22 ENCOUNTER — HOSPITAL ENCOUNTER (OUTPATIENT)
Dept: SPEECH THERAPY | Age: 87
Setting detail: THERAPIES SERIES
Discharge: HOME | End: 2022-06-22
Payer: MEDICARE

## 2022-06-22 PROCEDURE — 92526 ORAL FUNCTION THERAPY: CPT

## 2022-06-22 NOTE — PROGRESS NOTES
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 He was pleasant and cooperative during all therapy sessions and completed his home exercise program.  Focus of current treatment sessions has been on improving his oropharyngeal swallow. OBJECTIVE / GOAL STATUS   (Status Key: GM = Goal Met, MP = Making Progress, NC= No Change, BP = Beginning Progress, NI = Not Introduced, D/C = Discontinue Goal, NM = Not Met)    Patient will improve oropharyngeal swallow function to ensure airway protection during PO intake to maintain adequate nutrition/hydration and decrease signs/symptoms of aspiration to less than 1 x/day.         1. Vital stimulation treatment to target the following placements/muscle groups/muscle response secondary to the patients symptoms of premature spillage, penetration/aspiration, pharyngeal weakness, residuals, and penetration:     A. Placement 2B to increase function to the laryngeal extrinsics and suprahyoid muscles to improve hyolaryngeal excursion.-Completed   B. Placement 3A to increase function to the digastric and thyrohyoid muscles to improve hyolaryngeal excursion.-Completed   C. Placement 3B to increase function to the superior/middle/inferior pharyngeal constrictors and the pharyngeal shortening muscles to improve pharyngeal constriction.-Completed          2. Provide Deep Pharyngeal Neuromuscular Stimulation (DPNS) with focus on the following goals:       A.  Provide increased sensory input to light pressure receptors and increase cutaneous pressure to posterior tongue for timely initiation of pharyngeal swallow (CN V, VII, IX)-Completed      B. Elicit reflexive motor response to improve integrity of velar elevation, tongue base retraction, hyolaryngeal excursion, and pharyngeal constrictor movement (CN V, VII, X, XII)-Completed         3. Patient will complete Shaker and/or Chin Tuck Against Resistance (CTAR) to improve UES function to reduce pharyngeal residuals and reduce risk of penetration/aspiration with moderate verbal prompts. -NC      4. Patient will complete oral motor strength/coordination exercises to improve bolus prep/control and mastication with moderate verbal prompts. -NC     5. Patient will complete BOTR strength/ ROM exercises to reduce pharyngeal residuals and improve epiglottic inversion with  moderate verbal prompts. -NC      6. Patient will complete laryngeal strength/ ROM therapeutic exercises to improve airway protection for the least restrictive PO diet with  moderate verbal prompts-NC      7. Patient will tolerate the least restrictive PO diet to maintain adequate nutrition/ hydration via use of safe swallow/ compensatory strategies with  minimal verbal prompts and no more than 1 overt sign or symptoms of penetration or aspiration. -NC      8. Instruction regarding appropriate implementation of compensatory strategies to improve integrity of swallow function during PO intake. -GM     9. Patient  will complete Effortful Swallow therapeutically to target increased oral and base of tongue pressure, increased pharyngeal constrictor contractions, and increased UES relaxation duration to reduce pharyngeal residue with moderate verbal prompts-NC      10. Patient will complete Daniela Maneuver therapeutically to target increased pharyngeal constrictor contractions to reduce pharyngeal residue with moderate verbal prompts-NC            11.  Repeat swallow study 8-12 weeks post initiation of therapy or as deemed appropriate by the managing physician.-Completed on 6-21-22       ASSESSMENT / STANDARDIZED TEST RESULTS  Despite 12 Vital Stimulation/DPNS therapy sessions, patients Video Swallow Study did not reveal any functional gains and actually demonstrated an increased dysphagia. His Video Swallow Study on 6/21/22 revealed a Moderate oral phase and Severe pharyngeal phase dysphagia. This study had worsened in comparison to his study that was completed on 1/21/22. No safe diet texture was recommended secondary to aspiration and/or high risk of aspiration.   This study was characterized with the following deficits:       ORAL PREP/ ORAL PHASE:   Delayed A-P transit due to: decreased ability for initiation and reduced lingual strength   Decreased bolus formation resulting in observed premature pharyngeal spillage  Piecemeal deglutition                PHARYNGEAL PHASE:                ONSET TIME       Delayed initiation of the pharyngeal swallow was noted with swallow reflex triggered at the level of the vallecula                                         PHARYNGEAL RESIDUALS                              Vallecula/Pharyngeal Wall                                                                                        Reduced tongue base retraction and SEVERELY impaired epilgottic inversion resulting in residuals in vallecula and/or posterior pharyngeal wall for all consistencies administered which minimally cleared (HTL) and did not clear (PUREE) with spontaneous multiple swallow                              Pyriform Sinuses                             No significant residuals were noted in the pyriform sinuses                LARYNGEAL PENETRATION              Laryngeal penetration occurred prior to aspiration.  Further details under aspiration section.     ASPIRATION  Aspiration occurred BEFORE the swallow for honey consistency liquid due to decreased bolus formation and delayed pharyngeal swallow . Aspiration was moderate to severe and occurred consistently . spontaneous throat clearing was noted     Aspiration occurred DURING the swallow for honey consistency liquid due to  delayed laryngeal closure and inadequate laryngeal closure . Aspiration was moderate-severe and occurred consistently .spontaneous throat clearing was noted     Aspiration occurred AFTER the swallow for honey consistency liquid due to pharyngeal residuals . Aspiration was mild-moderate and occurred consistently .  spontaneous throat clearing was noted     PENETRATION-ASPIRATION SCALE (PAS):  THIN item not administered  MILDLY THICK item not administered  MODERATELY THICK 6 = Material enters the airway, passes below the vocal folds, and is ejected into the larynx or out of the airway (but repeatedly falls into airway)  PUREE 1 = Material does not enter the airway  HARD SOLID item not administered                                        COMPENSATORY STRATEGIES                          Compensatory strategies that were partially beneficial included Multiple swallow                             Compensatory strategies that were not beneficial included Multiple swallow, Effortful swallow and Super-Supraglottic Swallow                            STRUCTURAL/FUNCTIONAL ANOMALIES              No significant changes in  function however changes in nasality are notable during speech tasks      CERVICAL ESOPHAGEAL STAGE :     The cervical esophagus appeared adequate         SLP and patient reviewed results of this swallow study. Patient had an extensive amount of questions regarding his swallowing, pleasure feeding, and PEG tubes. SLP explained the purpose and procedure when receiving a feeding tube. Patient seems open to this option, along with some pleasure feeding for quality of life. He is aware of the risk of aspiration and pneumonia with any po intake, but quality of life for his age is also a large factor.   The PEG can ensure that he does not dehydrate (he has a history of dehydration in the past) and it can ensure that he maintains adequate nutrition, as well as take his medication safely.      SLP also explained that until the etiology of his dysphagia is determined, therapy will be discontinued. His study worsened in comparison to 5 months ago and SLP is concerned that he may be fatiguing after the therapy. Energy conservation was discussed and using his muscles for pleasure feeding rather than therapy.       SLP spoke with both patients son and daughter about all of the above. They are going to speak further with his neurologist about more testing, as well as a GI doctor about a feeding tube. SLP also discussed a possible ENT consult for a scope to ensure there is no pathology. RECOMMENDATIONS  Patient will be discharged from therapy at this time. SLP spoke with patients son and daughter and they will alert SLP as more diagnostic testing is completed and if further therapy is warranted in the future. Patient and/or caregiver aware of diagnosis? Yes   Patient and/or caregiver agree with POC?  Yes       Thank you for this referral.     Oscar Kebede M.S. [address]  6/22/2022

## 2022-06-22 NOTE — PROGRESS NOTES
[facility]  Outpatient Speech Therapy  Phone: [phone] Fax: [phone]       SPEECH LANGUAGE PATHOLOGY  DAILY PROGRESS NOTE      SUBJECTIVE:    Patient seen for 60 minute speech therapy session to target dysphagia.  He was pleasant and cooperative.      OBJECTIVE:     Last week, patient went to Urgent Care with his daughter and was diagnosed with pneumonia. He reported that he was given medication and is feeling better. He also underwent a repeat Video Swallow Study on 6/21/22 and this study revealed a Moderate oral phase and Severe pharyngeal phase dysphagia. This study had worsened in comparison to his study that was completed on 1/21/22. No safe diet texture was recommended secondary to aspiration and/or high risk of aspiration.   This study was characterized with the following deficits:      ORAL PREP/ ORAL PHASE:   Delayed A-P transit due to: decreased ability for initiation and reduced lingual strength   Decreased bolus formation resulting in observed premature pharyngeal spillage  Piecemeal deglutition                PHARYNGEAL PHASE:                ONSET TIME       Delayed initiation of the pharyngeal swallow was noted with swallow reflex triggered at the level of the vallecula                                         PHARYNGEAL RESIDUALS                              Vallecula/Pharyngeal Wall                                                                                        Reduced tongue base retraction and SEVERELY impaired epilgottic inversion resulting in residuals in vallecula and/or posterior pharyngeal wall for all consistencies administered which minimally cleared (HTL) and did not clear (PUREE) with spontaneous multiple swallow                              Pyriform Sinuses                             No significant residuals were noted in the pyriform sinuses                LARYNGEAL PENETRATION              Laryngeal penetration occurred prior to aspiration. Further details under aspiration section.     ASPIRATION  Aspiration occurred BEFORE the swallow for honey consistency liquid due to decreased bolus formation and delayed pharyngeal swallow . Aspiration was moderate to severe and occurred consistently . spontaneous throat clearing was noted     Aspiration occurred DURING the swallow for honey consistency liquid due to  delayed laryngeal closure and inadequate laryngeal closure . Aspiration was moderate-severe and occurred consistently . spontaneous throat clearing was noted     Aspiration occurred AFTER the swallow for honey consistency liquid due to pharyngeal residuals . Aspiration was mild-moderate and occurred consistently . spontaneous throat clearing was noted     PENETRATION-ASPIRATION SCALE (PAS):  THIN item not administered  MILDLY THICK item not administered  MODERATELY THICK 6 = Material enters the airway, passes below the vocal folds, and is ejected into the larynx or out of the airway (but repeatedly falls into airway)  PUREE 1 = Material does not enter the airway  HARD SOLID item not administered                                        COMPENSATORY STRATEGIES                          Compensatory strategies that were partially beneficial included Multiple swallow                             Compensatory strategies that were not beneficial included Multiple swallow, Effortful swallow and Super-Supraglottic Swallow                            STRUCTURAL/FUNCTIONAL ANOMALIES              No significant changes in  function however changes in nasality are notable during speech tasks      CERVICAL ESOPHAGEAL STAGE :     The cervical esophagus appeared adequate       SLP and patient reviewed results of this swallow study. Patient had an extensive amount of questions regarding his swallowing, pleasure feeding, and PEG tubes. SLP explained the purpose and procedure when receiving a feeding tube.   Patient seems open to this option, along with some pleasure feeding for quality of life. He is aware of the risk of aspiration and pneumonia with any po intake, but quality of life for his age is also a large factor. The PEG can ensure that he does not dehydrate (he has a history of dehydration in the past) and it can ensure that he maintains adequate nutrition, as well as take his medication safely. SLP also explained that until the etiology of his dysphagia is determined, therapy will be discontinued. His study worsened in comparison to 5 months ago and SLP is concerned that he may be fatiguing after the therapy. Energy conservation was discussed and using his muscles for pleasure feeding rather than therapy. SLP spoke with both patients son and daughter about all of the above. They are going to speak further with his neurologist about more testing, as well as a GI doctor about a feeding tube. SLP also discussed a possible ENT consult for a scope to ensure there is no pathology. ASSESSMENT:  Patient continues to present with a Moderate oral phase and Severe pharyngeal phase dysphagia per a Video Swallow Study completed on 6/21/22.      PLAN OF CARE:     Patient will be discharged from therapy at this time. SLP spoke with patients son and daughter and they will alert SLP as more diagnostic testing is completed and if further therapy is warranted in the future.         [address] [place], [place] [address]Yajaira [address]  6/22/2022      CPT CODE:       71580  dysphagia tx

## 2022-06-29 ENCOUNTER — APPOINTMENT (OUTPATIENT)
Dept: SPEECH THERAPY | Age: 87
End: 2022-06-29
Payer: MEDICARE

## 2023-03-06 ENCOUNTER — APPOINTMENT (OUTPATIENT)
Dept: CT IMAGING | Age: 88
End: 2023-03-06
Payer: MEDICARE

## 2023-03-06 ENCOUNTER — HOSPITAL ENCOUNTER (EMERGENCY)
Age: 88
Discharge: ANOTHER HOSPITAL | End: 2023-03-06
Attending: EMERGENCY MEDICINE
Payer: MEDICARE

## 2023-03-06 ENCOUNTER — APPOINTMENT (OUTPATIENT)
Dept: GENERAL RADIOLOGY | Age: 88
End: 2023-03-06
Payer: MEDICARE

## 2023-03-06 VITALS
BODY MASS INDEX: 18.2 KG/M2 | TEMPERATURE: 97.6 F | OXYGEN SATURATION: 96 % | WEIGHT: 130 LBS | HEIGHT: 71 IN | RESPIRATION RATE: 16 BRPM | HEART RATE: 78 BPM | SYSTOLIC BLOOD PRESSURE: 136 MMHG | DIASTOLIC BLOOD PRESSURE: 62 MMHG

## 2023-03-06 DIAGNOSIS — S72.001A CLOSED FRACTURE OF RIGHT HIP, INITIAL ENCOUNTER (HCC): Primary | ICD-10-CM

## 2023-03-06 LAB
ABO/RH: NORMAL
ALBUMIN SERPL-MCNC: 3.7 G/DL (ref 3.5–5.2)
ALP BLD-CCNC: 104 U/L (ref 40–129)
ALT SERPL-CCNC: 20 U/L (ref 0–40)
ANION GAP SERPL CALCULATED.3IONS-SCNC: 10 MMOL/L (ref 7–16)
ANTIBODY SCREEN: NORMAL
APTT: 31.3 SEC (ref 24.5–35.1)
AST SERPL-CCNC: 31 U/L (ref 0–39)
BASOPHILS ABSOLUTE: 0.02 E9/L (ref 0–0.2)
BASOPHILS RELATIVE PERCENT: 0.3 % (ref 0–2)
BILIRUB SERPL-MCNC: 0.4 MG/DL (ref 0–1.2)
BUN BLDV-MCNC: 18 MG/DL (ref 6–23)
CALCIUM SERPL-MCNC: 9.3 MG/DL (ref 8.6–10.2)
CHLORIDE BLD-SCNC: 95 MMOL/L (ref 98–107)
CO2: 27 MMOL/L (ref 22–29)
CREAT SERPL-MCNC: 0.6 MG/DL (ref 0.7–1.2)
EOSINOPHILS ABSOLUTE: 0.05 E9/L (ref 0.05–0.5)
EOSINOPHILS RELATIVE PERCENT: 0.7 % (ref 0–6)
GFR SERPL CREATININE-BSD FRML MDRD: >60 ML/MIN/1.73
GLUCOSE BLD-MCNC: 88 MG/DL (ref 74–99)
HCT VFR BLD CALC: 39.2 % (ref 37–54)
HEMOGLOBIN: 12.9 G/DL (ref 12.5–16.5)
IMMATURE GRANULOCYTES #: 0.05 E9/L
IMMATURE GRANULOCYTES %: 0.7 % (ref 0–5)
INR BLD: 1
LYMPHOCYTES ABSOLUTE: 1.04 E9/L (ref 1.5–4)
LYMPHOCYTES RELATIVE PERCENT: 14.8 % (ref 20–42)
MCH RBC QN AUTO: 30.4 PG (ref 26–35)
MCHC RBC AUTO-ENTMCNC: 32.9 % (ref 32–34.5)
MCV RBC AUTO: 92.5 FL (ref 80–99.9)
MONOCYTES ABSOLUTE: 0.54 E9/L (ref 0.1–0.95)
MONOCYTES RELATIVE PERCENT: 7.7 % (ref 2–12)
NEUTROPHILS ABSOLUTE: 5.34 E9/L (ref 1.8–7.3)
NEUTROPHILS RELATIVE PERCENT: 75.8 % (ref 43–80)
PDW BLD-RTO: 13.2 FL (ref 11.5–15)
PLATELET # BLD: 188 E9/L (ref 130–450)
PMV BLD AUTO: 8.9 FL (ref 7–12)
POTASSIUM SERPL-SCNC: 4.3 MMOL/L (ref 3.5–5)
PROTHROMBIN TIME: 11 SEC (ref 9.3–12.4)
RBC # BLD: 4.24 E12/L (ref 3.8–5.8)
SODIUM BLD-SCNC: 132 MMOL/L (ref 132–146)
TOTAL PROTEIN: 6.6 G/DL (ref 6.4–8.3)
TROPONIN, HIGH SENSITIVITY: 77 NG/L (ref 0–11)
TROPONIN, HIGH SENSITIVITY: 97 NG/L (ref 0–11)
WBC # BLD: 7 E9/L (ref 4.5–11.5)

## 2023-03-06 PROCEDURE — 71046 X-RAY EXAM CHEST 2 VIEWS: CPT

## 2023-03-06 PROCEDURE — 6370000000 HC RX 637 (ALT 250 FOR IP): Performed by: NURSE PRACTITIONER

## 2023-03-06 PROCEDURE — 86850 RBC ANTIBODY SCREEN: CPT

## 2023-03-06 PROCEDURE — 85025 COMPLETE CBC W/AUTO DIFF WBC: CPT

## 2023-03-06 PROCEDURE — 84484 ASSAY OF TROPONIN QUANT: CPT

## 2023-03-06 PROCEDURE — 85610 PROTHROMBIN TIME: CPT

## 2023-03-06 PROCEDURE — 93005 ELECTROCARDIOGRAM TRACING: CPT | Performed by: NURSE PRACTITIONER

## 2023-03-06 PROCEDURE — 85730 THROMBOPLASTIN TIME PARTIAL: CPT

## 2023-03-06 PROCEDURE — 86901 BLOOD TYPING SEROLOGIC RH(D): CPT

## 2023-03-06 PROCEDURE — 99285 EMERGENCY DEPT VISIT HI MDM: CPT

## 2023-03-06 PROCEDURE — 72125 CT NECK SPINE W/O DYE: CPT

## 2023-03-06 PROCEDURE — 86900 BLOOD TYPING SEROLOGIC ABO: CPT

## 2023-03-06 PROCEDURE — 80053 COMPREHEN METABOLIC PANEL: CPT

## 2023-03-06 PROCEDURE — 73502 X-RAY EXAM HIP UNI 2-3 VIEWS: CPT

## 2023-03-06 PROCEDURE — 70450 CT HEAD/BRAIN W/O DYE: CPT

## 2023-03-06 RX ORDER — HYDROCODONE BITARTRATE AND ACETAMINOPHEN 5; 325 MG/1; MG/1
1 TABLET ORAL ONCE
Status: COMPLETED | OUTPATIENT
Start: 2023-03-06 | End: 2023-03-06

## 2023-03-06 RX ADMIN — HYDROCODONE BITARTRATE AND ACETAMINOPHEN 1 TABLET: 5; 325 TABLET ORAL at 19:05

## 2023-03-06 ASSESSMENT — LIFESTYLE VARIABLES
HOW MANY STANDARD DRINKS CONTAINING ALCOHOL DO YOU HAVE ON A TYPICAL DAY: 1 OR 2
HOW OFTEN DO YOU HAVE A DRINK CONTAINING ALCOHOL: 2-4 TIMES A MONTH

## 2023-03-06 ASSESSMENT — PAIN SCALES - GENERAL: PAINLEVEL_OUTOF10: 0

## 2023-03-06 ASSESSMENT — PAIN - FUNCTIONAL ASSESSMENT: PAIN_FUNCTIONAL_ASSESSMENT: 0-10

## 2023-03-06 NOTE — ED PROVIDER NOTES
Shared PATRICIA-ED Attending Visit. CC: 30  HPI:  3/6/23, Time: 4:00 PM MATTHEW Orr is a 80 y.o. male presenting to the ED for fall and right hip pain, beginning pta ago. The complaint has been persistent, moderate in severity, and worsened by movement of right hip. Patient states he was parked at [place] lot when he got out of the car he was too close to the grass and he slipped fell onto the right hip. He denies any head or neck pain. He denies any loss of conscious. Is been unable to bear any weight and was unable to get up off the ground. He did have help from getting on the ground and subsequently called EMS who transported him here for further evaluation. He has no visible shortening or rotation noted to the right lower extremity however he has significant tenderness at the right hip area. ROS:   Pertinent positives and negatives are stated within HPI, all other systems reviewed and are negative.  --------------------------------------------- PAST HISTORY ---------------------------------------------  Past Medical History:  has a past medical history of Lea's esophagus, Carotid atherosclerosis, History of stress test, [place] (hard of hearing), Hypertension, Raynaud's disease, and Thyroid disease. Past Surgical History:  has a past surgical history that includes Hemorrhoid surgery; eye surgery (Bilateral); Endoscopy, colon, diagnostic; Colonoscopy; Tonsillectomy; Finger amputation; hernia repair (05/20/2018); and Upper gastrointestinal endoscopy (N/A, 1/16/2019). Social History:  reports that he has never smoked. He has never used smokeless tobacco. He reports current alcohol use of about 7.0 standard drinks per week. He reports that he does not use drugs. Family History: family history includes Heart Disease in his brother; Other in his father; Stroke in his mother. The patients home medications have been reviewed.     Allergies: Patient has no known allergies. ---------------------------------------------------PHYSICAL EXAM--------------------------------------    Constitutional/General: Alert and oriented x3, well appearing, non toxic in NAD  Head: Normocephalic and atraumatic  Eyes: PERRL, EOMI  Mouth: Oropharynx clear, handling secretions, no trismus  Neck: Supple, full ROM, non tender to palpation in the midline, no stridor, no crepitus, no meningeal signs  Pulmonary: Lungs clear to auscultation bilaterally, no wheezes, rales, or rhonchi. Not in respiratory distress  Cardiovascular:  Regular rate. Regular rhythm. No murmurs, gallops, or rubs. 2+ distal pulses  Chest: no chest wall tenderness  Abdomen: Soft. Non tender. Non distended. +BS. No rebound, guarding, or rigidity. No pulsatile masses appreciated. Musculoskeletal: Moves all extremities x 3. Warm and well perfused, no clubbing, cyanosis, or edema. Capillary refill <3 seconds. He is tenderness on palpation at the right hip area. Range of motion deferred due to concern for fracture. Pedal pulse are palpable 2+. Skin: warm and dry. No rashes. Neurologic: GCS 15, CN 2-12 grossly intact, no focal deficits, symmetric strength 5/5 in the upper and lower extremities bilaterally  Psych: Normal Affect    -------------------------------------------------- RESULTS -------------------------------------------------  I have personally reviewed all laboratory and imaging results for this patient. Results are listed below.      LABS:  Results for orders placed or performed during the hospital encounter of 03/06/23   Troponin   Result Value Ref Range    Troponin, High Sensitivity 77 (H) 0 - 11 ng/L   CBC with Auto Differential   Result Value Ref Range    WBC 7.0 4.5 - 11.5 E9/L    RBC 4.24 3.80 - 5.80 E12/L    Hemoglobin 12.9 12.5 - 16.5 g/dL    Hematocrit 39.2 37.0 - 54.0 %    MCV 92.5 80.0 - 99.9 fL    MCH 30.4 26.0 - 35.0 pg    MCHC 32.9 32.0 - 34.5 %    RDW 13.2 11.5 - 15.0 fL    Platelets 125 504 - 450 E9/L    MPV 8.9 7.0 - 12.0 fL    Neutrophils % 75.8 43.0 - 80.0 %    Immature Granulocytes % 0.7 0.0 - 5.0 %    Lymphocytes % 14.8 (L) 20.0 - 42.0 %    Monocytes % 7.7 2.0 - 12.0 %    Eosinophils % 0.7 0.0 - 6.0 %    Basophils % 0.3 0.0 - 2.0 %    Neutrophils Absolute 5.34 1.80 - 7.30 E9/L    Immature Granulocytes # 0.05 E9/L    Lymphocytes Absolute 1.04 (L) 1.50 - 4.00 E9/L    Monocytes Absolute 0.54 0.10 - 0.95 E9/L    Eosinophils Absolute 0.05 0.05 - 0.50 E9/L    Basophils Absolute 0.02 0.00 - 0.20 E9/L   Comprehensive Metabolic Panel   Result Value Ref Range    Sodium 132 132 - 146 mmol/L    Potassium 4.3 3.5 - 5.0 mmol/L    Chloride 95 (L) 98 - 107 mmol/L    CO2 27 22 - 29 mmol/L    Anion Gap 10 7 - 16 mmol/L    Glucose 88 74 - 99 mg/dL    BUN 18 6 - 23 mg/dL    Creatinine 0.6 (L) 0.7 - 1.2 mg/dL    Est, Glom Filt Rate >60 >=60 mL/min/1.73    Calcium 9.3 8.6 - 10.2 mg/dL    Total Protein 6.6 6.4 - 8.3 g/dL    Albumin 3.7 3.5 - 5.2 g/dL    Total Bilirubin 0.4 0.0 - 1.2 mg/dL    Alkaline Phosphatase 104 40 - 129 U/L    ALT 20 0 - 40 U/L    AST 31 0 - 39 U/L   Protime-INR   Result Value Ref Range    Protime 11.0 9.3 - 12.4 sec    INR 1.0    APTT   Result Value Ref Range    aPTT 31.3 24.5 - 35.1 sec   EKG 12 Lead   Result Value Ref Range    Ventricular Rate 87 BPM    Atrial Rate 87 BPM    P-R Interval 184 ms    QRS Duration 144 ms    Q-T Interval 406 ms    QTc Calculation (Bazett) 488 ms    P Axis 50 degrees    R Axis -39 degrees    T Axis 105 degrees       RADIOLOGY:  Interpreted by Radiologist.  CT HEAD WO CONTRAST   Final Result   CT HEAD WITHOUT CONTRAST:      1. No skull fracture or acute intracranial abnormality. 2. Complete opacification of the left frontal and maxillary sinuses. 3. 1Postoperative changes from prior left-sided sinonasal surgery. CT CERVICAL SPINE WITHOUT CONTRAST:      1. No fracture or joint dislocation is seen. 2. Degenerative changes, as described.          CT CERVICAL SPINE WO CONTRAST   Final Result   CT HEAD WITHOUT CONTRAST:      1. No skull fracture or acute intracranial abnormality. 2. Complete opacification of the left frontal and maxillary sinuses. 3. 1Postoperative changes from prior left-sided sinonasal surgery. CT CERVICAL SPINE WITHOUT CONTRAST:      1. No fracture or joint dislocation is seen. 2. Degenerative changes, as described. XR CHEST (2 VW)   Final Result   Persistent right lower lobe airspace opacities but improved aeration compared   to prior study. .         XR HIP 2-3 VW W PELVIS RIGHT   Final Result   Acute nondisplaced intertrochanteric right hip fracture. EKG Interpretation  Interpreted by emergency department physician    Rhythm: normal sinus   Rate: normal  Axis: left  Conduction: LBBB  ST Segments: no acute change  T Waves: no acute change    Clinical Impression: no acute changes  Comparison to prior EKG: stable as compared to patient's most recent EKG      ------------------------- NURSING NOTES AND VITALS REVIEWED ---------------------------   The nursing notes within the ED encounter and vital signs as below have been reviewed by myself. BP (!) 144/56   Pulse 83   Temp (!) 96.3 °F (35.7 °C) (Oral)   Resp 18   Ht 5' 11\" (1.803 m)   Wt 130 lb (59 kg)   SpO2 96%   BMI 18.13 kg/m²   Oxygen Saturation Interpretation: Normal    The patients available past medical records and past encounters were reviewed. ------------------------------ ED COURSE/MEDICAL DECISION MAKING----------------------  Medications - No data to display          Medical Decision Making:    Patient states he was parked at [place] when he got out of the car he was too close to the grass and he slipped fell onto the right hip. He denies any head or neck pain. He denies any loss of conscious. has been unable to bear any weight and was unable to get up off the ground.   He did have help from getting on the ground and subsequently called EMS who transported him here for further evaluation. He has no visible shortening or rotation noted to the right lower extremity however he has significant tenderness at the right hip area. Twelve-lead EKG shows normal sinus rhythm with a left bundle branch block and left axis deviation. Chest x-ray right lower lobe airspace opacities but no acute etiology. CT scan of the head and cervical spine are normal with no evidence of fracture dislocation or abnormality. X-ray of the right hip and pelvis show an acute nondisplaced intertrochanteric right hip fracture. PT of 11 with an INR 1.0 PTT 31.3 sodium 132 potassium of 4.3 chloride 95 BUN 18 creatinine 0.6 glucose is 88. WBC is 7.0 hemoglobin of 12.9 hematocrit of 39.2 high-sensitivity troponin at 77 the second troponin was drawn. Patient was offered medication twice but declined stating as long as he is not moving he has no pain. I received a phone call from the patient's son Dr. Estefani Mccormack who is an orthopedic surgeon at [facility] in [place] requesting that his father be transferred to the [facility] for himself or one of his colleagues to perform the repair on the right hip fracture. He advised that he would take care of all of the arrangements that were necessary for the transfer. I did discuss the results of the x-ray and the CAT scan results with the patient's son who states that he will have his father transferred to [place]. The access center was called to confirm the transfer to [facility].  Patient was examined by Dr. Karon Patton. [facility] transfer center called back advised the excepting physician will be Dr. Saul Bowen patient will be going to 4A room [phone] at [facility].   Re-Evaluations:             Re-evaluation.   Patients symptoms show no change      Consultations:                 Critical Care: 30        This patient's ED course included: a personal history and physicial examination, re-evaluation prior to disposition, multiple bedside re-evaluations, complex medical decision making and emergency management, and a personal history and physicial eaxmination    This patient has remained hemodynamically stable and remained unchanged during their ED course. Counseling: The emergency provider has spoken with the patient and family member patient, son, and daughter and discussed todays results, in addition to providing specific details for the plan of care and counseling regarding the diagnosis and prognosis. Questions are answered at this time and they are agreeable with the plan.       --------------------------------- IMPRESSION AND DISPOSITION ---------------------------------    IMPRESSION  1. Closed fracture of right hip, initial encounter [facility])        DISPOSITION  Disposition: Transfer to [facility] to [facility]  Patient condition is stable        NOTE: This report was transcribed using voice recognition software.  Every effort was made to ensure accuracy; however, inadvertent computerized transcription errors may be present          CARLOS A Hall - TOM  03/06/23 0069

## 2023-03-06 NOTE — PROGRESS NOTES
Anticipated admission: Database initiated pharmacy and medications verified with the patients son in law. He is A&O from home alone. Hard of hearing. He is supposed to use a cane while ambulating and is RA at baseline.

## 2023-03-07 LAB
EKG ATRIAL RATE: 86 BPM
EKG P AXIS: 24 DEGREES
EKG P-R INTERVAL: 160 MS
EKG Q-T INTERVAL: 406 MS
EKG QRS DURATION: 146 MS
EKG QTC CALCULATION (BAZETT): 485 MS
EKG R AXIS: -36 DEGREES
EKG T AXIS: 109 DEGREES
EKG VENTRICULAR RATE: 86 BPM

## 2023-03-07 PROCEDURE — 93010 ELECTROCARDIOGRAM REPORT: CPT | Performed by: INTERNAL MEDICINE

## 2024-01-01 ENCOUNTER — APPOINTMENT (OUTPATIENT)
Dept: CT IMAGING | Age: 88
DRG: 871 | End: 2024-01-01
Payer: MEDICARE

## 2024-01-01 ENCOUNTER — APPOINTMENT (OUTPATIENT)
Dept: GENERAL RADIOLOGY | Age: 88
DRG: 871 | End: 2024-01-01
Payer: MEDICARE

## 2024-01-01 ENCOUNTER — HOSPITAL ENCOUNTER (INPATIENT)
Age: 88
LOS: 2 days | DRG: 871 | End: 2024-12-28
Attending: EMERGENCY MEDICINE | Admitting: INTERNAL MEDICINE
Payer: MEDICARE

## 2024-01-01 VITALS — HEIGHT: 70 IN | WEIGHT: 128.1 LBS | BODY MASS INDEX: 18.34 KG/M2 | TEMPERATURE: 98.8 F | OXYGEN SATURATION: 91 %

## 2024-01-01 DIAGNOSIS — R41.82 ALTERED MENTAL STATUS, UNSPECIFIED ALTERED MENTAL STATUS TYPE: ICD-10-CM

## 2024-01-01 DIAGNOSIS — J96.01 ACUTE RESPIRATORY FAILURE WITH HYPOXIA: ICD-10-CM

## 2024-01-01 DIAGNOSIS — J18.9 PNEUMONIA OF RIGHT LUNG DUE TO INFECTIOUS ORGANISM, UNSPECIFIED PART OF LUNG: Primary | ICD-10-CM

## 2024-01-01 LAB
ALBUMIN SERPL-MCNC: 3.2 G/DL (ref 3.5–5.2)
ALBUMIN SERPL-MCNC: 3.4 G/DL (ref 3.5–5.2)
ALP SERPL-CCNC: 89 U/L (ref 40–129)
ALP SERPL-CCNC: 94 U/L (ref 40–129)
ALT SERPL-CCNC: 19 U/L (ref 0–40)
ALT SERPL-CCNC: 31 U/L (ref 0–40)
ANION GAP SERPL CALCULATED.3IONS-SCNC: 13 MMOL/L (ref 7–16)
ANION GAP SERPL CALCULATED.3IONS-SCNC: 9 MMOL/L (ref 7–16)
ANION GAP SERPL CALCULATED.3IONS-SCNC: 9 MMOL/L (ref 7–16)
AST SERPL-CCNC: 39 U/L (ref 0–39)
AST SERPL-CCNC: 73 U/L (ref 0–39)
B.E.: -8.6 MMOL/L (ref -3–3)
B.E.: 1.1 MMOL/L (ref -3–3)
BASOPHILS # BLD: 0 K/UL (ref 0–0.2)
BASOPHILS # BLD: 0 K/UL (ref 0–0.2)
BASOPHILS NFR BLD: 0 % (ref 0–2)
BASOPHILS NFR BLD: 0 % (ref 0–2)
BILIRUB DIRECT SERPL-MCNC: <0.2 MG/DL (ref 0–0.3)
BILIRUB INDIRECT SERPL-MCNC: ABNORMAL MG/DL (ref 0–1)
BILIRUB SERPL-MCNC: 0.5 MG/DL (ref 0–1.2)
BILIRUB SERPL-MCNC: 0.7 MG/DL (ref 0–1.2)
BILIRUB UR QL STRIP: NEGATIVE
BUN SERPL-MCNC: 31 MG/DL (ref 6–23)
BUN SERPL-MCNC: 32 MG/DL (ref 6–23)
BUN SERPL-MCNC: 41 MG/DL (ref 6–23)
CALCIUM SERPL-MCNC: 8 MG/DL (ref 8.6–10.2)
CALCIUM SERPL-MCNC: 8.3 MG/DL (ref 8.6–10.2)
CALCIUM SERPL-MCNC: 9 MG/DL (ref 8.6–10.2)
CHLORIDE SERPL-SCNC: 105 MMOL/L (ref 98–107)
CHLORIDE SERPL-SCNC: 107 MMOL/L (ref 98–107)
CHLORIDE SERPL-SCNC: 114 MMOL/L (ref 98–107)
CLARITY UR: CLEAR
CO2 SERPL-SCNC: 20 MMOL/L (ref 22–29)
CO2 SERPL-SCNC: 23 MMOL/L (ref 22–29)
CO2 SERPL-SCNC: 26 MMOL/L (ref 22–29)
COHB: 0.5 % (ref 0–1.5)
COHB: 1.2 % (ref 0–1.5)
COLOR UR: YELLOW
CREAT SERPL-MCNC: 0.7 MG/DL (ref 0.7–1.2)
CREAT SERPL-MCNC: 0.8 MG/DL (ref 0.7–1.2)
CREAT SERPL-MCNC: 0.8 MG/DL (ref 0.7–1.2)
CRITICAL: ABNORMAL
CRITICAL: ABNORMAL
CRYSTALS URNS MICRO: ABNORMAL /HPF
DATE ANALYZED: ABNORMAL
DATE ANALYZED: ABNORMAL
DATE OF COLLECTION: ABNORMAL
DATE OF COLLECTION: ABNORMAL
EKG ATRIAL RATE: 104 BPM
EKG P AXIS: 39 DEGREES
EKG P-R INTERVAL: 158 MS
EKG Q-T INTERVAL: 362 MS
EKG QRS DURATION: 132 MS
EKG QTC CALCULATION (BAZETT): 476 MS
EKG R AXIS: -34 DEGREES
EKG T AXIS: 119 DEGREES
EKG VENTRICULAR RATE: 104 BPM
EOSINOPHIL # BLD: 0 K/UL (ref 0.05–0.5)
EOSINOPHIL # BLD: 0.06 K/UL (ref 0.05–0.5)
EOSINOPHILS RELATIVE PERCENT: 0 % (ref 0–6)
EOSINOPHILS RELATIVE PERCENT: 2 % (ref 0–6)
ERYTHROCYTE [DISTWIDTH] IN BLOOD BY AUTOMATED COUNT: 14.8 % (ref 11.5–15)
ERYTHROCYTE [DISTWIDTH] IN BLOOD BY AUTOMATED COUNT: 15.3 % (ref 11.5–15)
GFR, ESTIMATED: 82 ML/MIN/1.73M2
GFR, ESTIMATED: 83 ML/MIN/1.73M2
GFR, ESTIMATED: 86 ML/MIN/1.73M2
GLUCOSE BLD-MCNC: 186 MG/DL (ref 74–99)
GLUCOSE SERPL-MCNC: 217 MG/DL (ref 74–99)
GLUCOSE SERPL-MCNC: 95 MG/DL (ref 74–99)
GLUCOSE SERPL-MCNC: 96 MG/DL (ref 74–99)
GLUCOSE UR STRIP-MCNC: NEGATIVE MG/DL
HCO3: 17.6 MMOL/L (ref 22–26)
HCO3: 23 MMOL/L (ref 22–26)
HCT VFR BLD AUTO: 32.5 % (ref 37–54)
HCT VFR BLD AUTO: 40.1 % (ref 37–54)
HGB BLD-MCNC: 10.7 G/DL (ref 12.5–16.5)
HGB BLD-MCNC: 12.7 G/DL (ref 12.5–16.5)
HGB UR QL STRIP.AUTO: ABNORMAL
HHB: 11 % (ref 0–5)
HHB: 3.4 % (ref 0–5)
KETONES UR STRIP-MCNC: NEGATIVE MG/DL
LAB: ABNORMAL
LAB: ABNORMAL
LACTATE BLDV-SCNC: 5.4 MMOL/L (ref 0.5–2.2)
LEUKOCYTE ESTERASE UR QL STRIP: NEGATIVE
LYMPHOCYTES NFR BLD: 0.03 K/UL (ref 1.5–4)
LYMPHOCYTES NFR BLD: 0.17 K/UL (ref 1.5–4)
LYMPHOCYTES RELATIVE PERCENT: 1 % (ref 20–42)
LYMPHOCYTES RELATIVE PERCENT: 4 % (ref 20–42)
Lab: 1241
Lab: 1730
MAGNESIUM SERPL-MCNC: 1.7 MG/DL (ref 1.6–2.6)
MAGNESIUM SERPL-MCNC: 2.1 MG/DL (ref 1.6–2.6)
MCH RBC QN AUTO: 32.1 PG (ref 26–35)
MCH RBC QN AUTO: 32.6 PG (ref 26–35)
MCHC RBC AUTO-ENTMCNC: 31.7 G/DL (ref 32–34.5)
MCHC RBC AUTO-ENTMCNC: 32.9 G/DL (ref 32–34.5)
MCV RBC AUTO: 101.3 FL (ref 80–99.9)
MCV RBC AUTO: 99.1 FL (ref 80–99.9)
METAMYELOCYTES ABSOLUTE COUNT: 0.03 K/UL (ref 0–0.12)
METAMYELOCYTES ABSOLUTE COUNT: 0.04 K/UL (ref 0–0.12)
METAMYELOCYTES: 1 % (ref 0–1)
METAMYELOCYTES: 1 % (ref 0–1)
METHB: 0.3 % (ref 0–1.5)
METHB: 0.3 % (ref 0–1.5)
MODE: ABNORMAL
MONOCYTES NFR BLD: 0.04 K/UL (ref 0.1–0.95)
MONOCYTES NFR BLD: 0.17 K/UL (ref 0.1–0.95)
MONOCYTES NFR BLD: 1 % (ref 2–12)
MONOCYTES NFR BLD: 5 % (ref 2–12)
NEUTROPHILS NFR BLD: 91 % (ref 43–80)
NEUTROPHILS NFR BLD: 95 % (ref 43–80)
NEUTS SEG NFR BLD: 2.92 K/UL (ref 1.8–7.3)
NEUTS SEG NFR BLD: 4.74 K/UL (ref 1.8–7.3)
NITRITE UR QL STRIP: NEGATIVE
O2 CONTENT: 16.3 ML/DL
O2 CONTENT: 18.2 ML/DL
O2 SATURATION: 88.9 % (ref 92–98.5)
O2 SATURATION: 96.5 % (ref 92–98.5)
O2HB: 88.2 % (ref 94–97)
O2HB: 95.1 % (ref 94–97)
OPERATOR ID: 1115
OPERATOR ID: ABNORMAL
PATIENT TEMP: 37 C
PATIENT TEMP: 37 C
PCO2: 29.2 MMHG (ref 35–45)
PCO2: 38.9 MMHG (ref 35–45)
PH BLOOD GAS: 7.27 (ref 7.35–7.45)
PH BLOOD GAS: 7.51 (ref 7.35–7.45)
PH UR STRIP: 6.5 [PH] (ref 5–9)
PHOSPHATE SERPL-MCNC: 2.5 MG/DL (ref 2.5–4.5)
PHOSPHATE SERPL-MCNC: 3.2 MG/DL (ref 2.5–4.5)
PLATELET CONFIRMATION: NORMAL
PLATELET, FLUORESCENCE: 115 K/UL (ref 130–450)
PLATELET, FLUORESCENCE: 94 K/UL (ref 130–450)
PMV BLD AUTO: 10.2 FL (ref 7–12)
PMV BLD AUTO: 10.3 FL (ref 7–12)
PO2: 61.7 MMHG (ref 75–100)
PO2: 80.5 MMHG (ref 75–100)
POTASSIUM SERPL-SCNC: 3.7 MMOL/L (ref 3.5–5)
POTASSIUM SERPL-SCNC: 4 MMOL/L (ref 3.5–5)
POTASSIUM SERPL-SCNC: 4.2 MMOL/L (ref 3.5–5)
PROCALCITONIN SERPL-MCNC: 3.03 NG/ML (ref 0–0.08)
PROCALCITONIN SERPL-MCNC: 3.11 NG/ML (ref 0–0.08)
PROT SERPL-MCNC: 6.5 G/DL (ref 6.4–8.3)
PROT SERPL-MCNC: 6.5 G/DL (ref 6.4–8.3)
PROT UR STRIP-MCNC: ABNORMAL MG/DL
RBC # BLD AUTO: 3.28 M/UL (ref 3.8–5.8)
RBC # BLD AUTO: 3.96 M/UL (ref 3.8–5.8)
RBC # BLD: ABNORMAL 10*6/UL
RBC # BLD: NORMAL 10*6/UL
RBC #/AREA URNS HPF: ABNORMAL /HPF
SODIUM SERPL-SCNC: 139 MMOL/L (ref 132–146)
SODIUM SERPL-SCNC: 140 MMOL/L (ref 132–146)
SODIUM SERPL-SCNC: 147 MMOL/L (ref 132–146)
SOURCE, BLOOD GAS: ABNORMAL
SOURCE, BLOOD GAS: ABNORMAL
SP GR UR STRIP: 1.01 (ref 1–1.03)
THB: 13.1 G/DL (ref 11.5–16.5)
THB: 13.6 G/DL (ref 11.5–16.5)
TIME ANALYZED: 1256
TIME ANALYZED: 1735
TROPONIN I SERPL HS-MCNC: 48 NG/L (ref 0–11)
TROPONIN I SERPL HS-MCNC: 48 NG/L (ref 0–11)
TROPONIN I SERPL HS-MCNC: 49 NG/L (ref 0–11)
TSH SERPL DL<=0.05 MIU/L-ACNC: 1.77 UIU/ML (ref 0.27–4.2)
UROBILINOGEN UR STRIP-ACNC: 0.2 EU/DL (ref 0–1)
WBC #/AREA URNS HPF: ABNORMAL /HPF
WBC OTHER # BLD: 3.2 K/UL (ref 4.5–11.5)
WBC OTHER # BLD: 5 K/UL (ref 4.5–11.5)

## 2024-01-01 PROCEDURE — 6360000002 HC RX W HCPCS: Performed by: INTERNAL MEDICINE

## 2024-01-01 PROCEDURE — 51701 INSERT BLADDER CATHETER: CPT

## 2024-01-01 PROCEDURE — 2580000003 HC RX 258: Performed by: HOSPITALIST

## 2024-01-01 PROCEDURE — 83605 ASSAY OF LACTIC ACID: CPT

## 2024-01-01 PROCEDURE — 6360000002 HC RX W HCPCS: Performed by: EMERGENCY MEDICINE

## 2024-01-01 PROCEDURE — 96365 THER/PROPH/DIAG IV INF INIT: CPT

## 2024-01-01 PROCEDURE — 84484 ASSAY OF TROPONIN QUANT: CPT

## 2024-01-01 PROCEDURE — 93010 ELECTROCARDIOGRAM REPORT: CPT | Performed by: INTERNAL MEDICINE

## 2024-01-01 PROCEDURE — 85025 COMPLETE CBC W/AUTO DIFF WBC: CPT

## 2024-01-01 PROCEDURE — 84443 ASSAY THYROID STIM HORMONE: CPT

## 2024-01-01 PROCEDURE — 99285 EMERGENCY DEPT VISIT HI MDM: CPT

## 2024-01-01 PROCEDURE — 96368 THER/DIAG CONCURRENT INF: CPT

## 2024-01-01 PROCEDURE — 6370000000 HC RX 637 (ALT 250 FOR IP): Performed by: HOSPITALIST

## 2024-01-01 PROCEDURE — 82248 BILIRUBIN DIRECT: CPT

## 2024-01-01 PROCEDURE — 87449 NOS EACH ORGANISM AG IA: CPT

## 2024-01-01 PROCEDURE — 84100 ASSAY OF PHOSPHORUS: CPT

## 2024-01-01 PROCEDURE — 94640 AIRWAY INHALATION TREATMENT: CPT

## 2024-01-01 PROCEDURE — 81001 URINALYSIS AUTO W/SCOPE: CPT

## 2024-01-01 PROCEDURE — 97165 OT EVAL LOW COMPLEX 30 MIN: CPT

## 2024-01-01 PROCEDURE — 36600 WITHDRAWAL OF ARTERIAL BLOOD: CPT

## 2024-01-01 PROCEDURE — 36415 COLL VENOUS BLD VENIPUNCTURE: CPT

## 2024-01-01 PROCEDURE — 2580000003 HC RX 258: Performed by: INTERNAL MEDICINE

## 2024-01-01 PROCEDURE — 2700000000 HC OXYGEN THERAPY PER DAY

## 2024-01-01 PROCEDURE — 83735 ASSAY OF MAGNESIUM: CPT

## 2024-01-01 PROCEDURE — 71045 X-RAY EXAM CHEST 1 VIEW: CPT

## 2024-01-01 PROCEDURE — 87899 AGENT NOS ASSAY W/OPTIC: CPT

## 2024-01-01 PROCEDURE — 82805 BLOOD GASES W/O2 SATURATION: CPT

## 2024-01-01 PROCEDURE — 82947 ASSAY GLUCOSE BLOOD QUANT: CPT

## 2024-01-01 PROCEDURE — 2500000003 HC RX 250 WO HCPCS: Performed by: EMERGENCY MEDICINE

## 2024-01-01 PROCEDURE — 51798 US URINE CAPACITY MEASURE: CPT

## 2024-01-01 PROCEDURE — 87324 CLOSTRIDIUM AG IA: CPT

## 2024-01-01 PROCEDURE — 94664 DEMO&/EVAL PT USE INHALER: CPT

## 2024-01-01 PROCEDURE — 80053 COMPREHEN METABOLIC PANEL: CPT

## 2024-01-01 PROCEDURE — 2500000003 HC RX 250 WO HCPCS: Performed by: HOSPITALIST

## 2024-01-01 PROCEDURE — 2060000000 HC ICU INTERMEDIATE R&B

## 2024-01-01 PROCEDURE — 6360000002 HC RX W HCPCS: Performed by: HOSPITALIST

## 2024-01-01 PROCEDURE — 2500000003 HC RX 250 WO HCPCS: Performed by: INTERNAL MEDICINE

## 2024-01-01 PROCEDURE — 80048 BASIC METABOLIC PNL TOTAL CA: CPT

## 2024-01-01 PROCEDURE — 96366 THER/PROPH/DIAG IV INF ADDON: CPT

## 2024-01-01 PROCEDURE — 2580000003 HC RX 258: Performed by: EMERGENCY MEDICINE

## 2024-01-01 PROCEDURE — 84145 PROCALCITONIN (PCT): CPT

## 2024-01-01 PROCEDURE — 93005 ELECTROCARDIOGRAM TRACING: CPT | Performed by: EMERGENCY MEDICINE

## 2024-01-01 PROCEDURE — 6360000002 HC RX W HCPCS: Performed by: NURSE PRACTITIONER

## 2024-01-01 PROCEDURE — 97535 SELF CARE MNGMENT TRAINING: CPT

## 2024-01-01 PROCEDURE — 87086 URINE CULTURE/COLONY COUNT: CPT

## 2024-01-01 PROCEDURE — 70450 CT HEAD/BRAIN W/O DYE: CPT

## 2024-01-01 RX ORDER — PRAMIPEXOLE DIHYDROCHLORIDE 0.25 MG/1
0.5 TABLET ORAL NIGHTLY
Status: DISCONTINUED | OUTPATIENT
Start: 2024-01-01 | End: 2024-12-28 | Stop reason: HOSPADM

## 2024-01-01 RX ORDER — SODIUM CHLORIDE 9 MG/ML
INJECTION, SOLUTION INTRAVENOUS PRN
Status: DISCONTINUED | OUTPATIENT
Start: 2024-01-01 | End: 2024-12-28 | Stop reason: HOSPADM

## 2024-01-01 RX ORDER — SODIUM CHLORIDE 0.9 % (FLUSH) 0.9 %
5-40 SYRINGE (ML) INJECTION EVERY 12 HOURS SCHEDULED
Status: DISCONTINUED | OUTPATIENT
Start: 2024-01-01 | End: 2024-12-28 | Stop reason: HOSPADM

## 2024-01-01 RX ORDER — CODEINE PHOSPHATE AND GUAIFENESIN 10; 100 MG/5ML; MG/5ML
10 SOLUTION ORAL 3 TIMES DAILY PRN
COMMUNITY

## 2024-01-01 RX ORDER — ACETAMINOPHEN 325 MG/1
650 TABLET ORAL EVERY 6 HOURS PRN
Status: DISCONTINUED | OUTPATIENT
Start: 2024-01-01 | End: 2024-12-28 | Stop reason: HOSPADM

## 2024-01-01 RX ORDER — 0.9 % SODIUM CHLORIDE 0.9 %
500 INTRAVENOUS SOLUTION INTRAVENOUS ONCE
Status: COMPLETED | OUTPATIENT
Start: 2024-01-01 | End: 2024-01-01

## 2024-01-01 RX ORDER — METOPROLOL TARTRATE 1 MG/ML
5 INJECTION, SOLUTION INTRAVENOUS ONCE
Status: COMPLETED | OUTPATIENT
Start: 2024-01-01 | End: 2024-01-01

## 2024-01-01 RX ORDER — ACETAMINOPHEN 650 MG/1
650 SUPPOSITORY RECTAL EVERY 6 HOURS PRN
Status: DISCONTINUED | OUTPATIENT
Start: 2024-01-01 | End: 2024-12-28 | Stop reason: HOSPADM

## 2024-01-01 RX ORDER — LEVOTHYROXINE SODIUM 112 UG/1
112 TABLET ORAL DAILY
Status: DISCONTINUED | OUTPATIENT
Start: 2024-01-01 | End: 2024-12-28 | Stop reason: HOSPADM

## 2024-01-01 RX ORDER — FAMOTIDINE 20 MG/1
20 TABLET, FILM COATED ORAL 2 TIMES DAILY
Status: DISCONTINUED | OUTPATIENT
Start: 2024-01-01 | End: 2024-12-28 | Stop reason: HOSPADM

## 2024-01-01 RX ORDER — SODIUM CHLORIDE, SODIUM LACTATE, POTASSIUM CHLORIDE, AND CALCIUM CHLORIDE .6; .31; .03; .02 G/100ML; G/100ML; G/100ML; G/100ML
500 INJECTION, SOLUTION INTRAVENOUS ONCE
Status: DISCONTINUED | OUTPATIENT
Start: 2024-01-01 | End: 2024-12-28 | Stop reason: HOSPADM

## 2024-01-01 RX ORDER — ASPIRIN 81 MG/1
81 TABLET ORAL NIGHTLY
Status: DISCONTINUED | OUTPATIENT
Start: 2024-01-01 | End: 2024-12-28 | Stop reason: HOSPADM

## 2024-01-01 RX ORDER — SODIUM CHLORIDE 0.9 % (FLUSH) 0.9 %
5-40 SYRINGE (ML) INJECTION PRN
Status: DISCONTINUED | OUTPATIENT
Start: 2024-01-01 | End: 2024-12-28 | Stop reason: HOSPADM

## 2024-01-01 RX ORDER — POLYETHYLENE GLYCOL 3350 17 G/17G
17 POWDER, FOR SOLUTION ORAL DAILY PRN
Status: DISCONTINUED | OUTPATIENT
Start: 2024-01-01 | End: 2024-12-28 | Stop reason: HOSPADM

## 2024-01-01 RX ORDER — SODIUM CHLORIDE, SODIUM LACTATE, POTASSIUM CHLORIDE, CALCIUM CHLORIDE 600; 310; 30; 20 MG/100ML; MG/100ML; MG/100ML; MG/100ML
INJECTION, SOLUTION INTRAVENOUS CONTINUOUS
Status: DISCONTINUED | OUTPATIENT
Start: 2024-01-01 | End: 2024-12-28 | Stop reason: HOSPADM

## 2024-01-01 RX ORDER — METHYLPREDNISOLONE SODIUM SUCCINATE 125 MG/2ML
125 INJECTION INTRAMUSCULAR; INTRAVENOUS ONCE
Status: COMPLETED | OUTPATIENT
Start: 2024-01-01 | End: 2024-01-01

## 2024-01-01 RX ORDER — DOXAZOSIN 2 MG/1
2 TABLET ORAL NIGHTLY
Status: DISCONTINUED | OUTPATIENT
Start: 2024-01-01 | End: 2024-01-01

## 2024-01-01 RX ORDER — BENZONATATE 100 MG/1
100 CAPSULE ORAL 3 TIMES DAILY PRN
Status: DISCONTINUED | OUTPATIENT
Start: 2024-01-01 | End: 2024-12-28 | Stop reason: HOSPADM

## 2024-01-01 RX ORDER — 0.9 % SODIUM CHLORIDE 0.9 %
1000 INTRAVENOUS SOLUTION INTRAVENOUS ONCE
Status: COMPLETED | OUTPATIENT
Start: 2024-01-01 | End: 2024-01-01

## 2024-01-01 RX ORDER — ONDANSETRON 4 MG/1
4 TABLET, ORALLY DISINTEGRATING ORAL EVERY 8 HOURS PRN
Status: DISCONTINUED | OUTPATIENT
Start: 2024-01-01 | End: 2024-12-28 | Stop reason: HOSPADM

## 2024-01-01 RX ORDER — METOPROLOL SUCCINATE 25 MG/1
25 TABLET, EXTENDED RELEASE ORAL DAILY
Status: DISCONTINUED | OUTPATIENT
Start: 2024-01-01 | End: 2024-12-28 | Stop reason: HOSPADM

## 2024-01-01 RX ORDER — IPRATROPIUM BROMIDE AND ALBUTEROL SULFATE 2.5; .5 MG/3ML; MG/3ML
1 SOLUTION RESPIRATORY (INHALATION)
Status: DISCONTINUED | OUTPATIENT
Start: 2024-01-01 | End: 2024-12-28 | Stop reason: HOSPADM

## 2024-01-01 RX ORDER — SODIUM CHLORIDE FOR INHALATION 3 %
4 VIAL, NEBULIZER (ML) INHALATION 2 TIMES DAILY
Status: DISCONTINUED | OUTPATIENT
Start: 2024-01-01 | End: 2024-12-28 | Stop reason: HOSPADM

## 2024-01-01 RX ORDER — LORAZEPAM 2 MG/ML
0.5 INJECTION INTRAMUSCULAR
Status: DISCONTINUED | OUTPATIENT
Start: 2024-01-01 | End: 2024-12-28 | Stop reason: HOSPADM

## 2024-01-01 RX ORDER — METOPROLOL TARTRATE 1 MG/ML
INJECTION, SOLUTION INTRAVENOUS
Status: DISCONTINUED
Start: 2024-01-01 | End: 2024-12-28 | Stop reason: HOSPADM

## 2024-01-01 RX ORDER — MORPHINE SULFATE 2 MG/ML
2 INJECTION, SOLUTION INTRAMUSCULAR; INTRAVENOUS
Status: DISCONTINUED | OUTPATIENT
Start: 2024-01-01 | End: 2024-12-28 | Stop reason: HOSPADM

## 2024-01-01 RX ORDER — ONDANSETRON 2 MG/ML
4 INJECTION INTRAMUSCULAR; INTRAVENOUS EVERY 6 HOURS PRN
Status: DISCONTINUED | OUTPATIENT
Start: 2024-01-01 | End: 2024-12-28 | Stop reason: HOSPADM

## 2024-01-01 RX ORDER — LEVALBUTEROL INHALATION SOLUTION 0.63 MG/3ML
0.63 SOLUTION RESPIRATORY (INHALATION) EVERY 4 HOURS PRN
Status: DISCONTINUED | OUTPATIENT
Start: 2024-01-01 | End: 2024-12-28 | Stop reason: HOSPADM

## 2024-01-01 RX ORDER — SODIUM CHLORIDE 9 MG/ML
INJECTION, SOLUTION INTRAVENOUS CONTINUOUS
Status: DISCONTINUED | OUTPATIENT
Start: 2024-01-01 | End: 2024-01-01

## 2024-01-01 RX ORDER — AZITHROMYCIN 250 MG/1
500 TABLET, FILM COATED ORAL EVERY 24 HOURS
Status: DISCONTINUED | OUTPATIENT
Start: 2024-01-01 | End: 2024-12-28 | Stop reason: HOSPADM

## 2024-01-01 RX ORDER — METHYLPREDNISOLONE SODIUM SUCCINATE 125 MG/2ML
INJECTION INTRAMUSCULAR; INTRAVENOUS
Status: DISCONTINUED
Start: 2024-01-01 | End: 2024-12-28 | Stop reason: HOSPADM

## 2024-01-01 RX ORDER — DOXAZOSIN 2 MG/1
2 TABLET ORAL NIGHTLY
Status: DISCONTINUED | OUTPATIENT
Start: 2024-01-01 | End: 2024-12-28 | Stop reason: HOSPADM

## 2024-01-01 RX ORDER — ALBUTEROL SULFATE 0.83 MG/ML
2.5 SOLUTION RESPIRATORY (INHALATION) EVERY 4 HOURS PRN
Status: DISCONTINUED | OUTPATIENT
Start: 2024-01-01 | End: 2024-12-28 | Stop reason: HOSPADM

## 2024-01-01 RX ORDER — ENOXAPARIN SODIUM 100 MG/ML
40 INJECTION SUBCUTANEOUS DAILY
Status: DISCONTINUED | OUTPATIENT
Start: 2024-01-01 | End: 2024-12-28 | Stop reason: HOSPADM

## 2024-01-01 RX ADMIN — LORAZEPAM 0.5 MG: 2 INJECTION INTRAMUSCULAR; INTRAVENOUS at 23:07

## 2024-01-01 RX ADMIN — IPRATROPIUM BROMIDE AND ALBUTEROL SULFATE 1 DOSE: .5; 2.5 SOLUTION RESPIRATORY (INHALATION) at 12:45

## 2024-01-01 RX ADMIN — IPRATROPIUM BROMIDE AND ALBUTEROL SULFATE 1 DOSE: .5; 2.5 SOLUTION RESPIRATORY (INHALATION) at 08:28

## 2024-01-01 RX ADMIN — LEVOTHYROXINE SODIUM 112 MCG: 0.11 TABLET ORAL at 05:31

## 2024-01-01 RX ADMIN — AMPICILLIN SODIUM AND SULBACTAM SODIUM 3000 MG: 2; 1 INJECTION, POWDER, FOR SOLUTION INTRAMUSCULAR; INTRAVENOUS at 04:49

## 2024-01-01 RX ADMIN — FAMOTIDINE 20 MG: 20 TABLET, FILM COATED ORAL at 21:14

## 2024-01-01 RX ADMIN — FAMOTIDINE 20 MG: 20 TABLET, FILM COATED ORAL at 22:19

## 2024-01-01 RX ADMIN — ENOXAPARIN SODIUM 40 MG: 100 INJECTION SUBCUTANEOUS at 22:18

## 2024-01-01 RX ADMIN — SODIUM CHLORIDE: 9 INJECTION, SOLUTION INTRAVENOUS at 18:57

## 2024-01-01 RX ADMIN — MORPHINE SULFATE 2 MG: 2 INJECTION, SOLUTION INTRAMUSCULAR; INTRAVENOUS at 23:07

## 2024-01-01 RX ADMIN — SODIUM CHLORIDE, PRESERVATIVE FREE 10 ML: 5 INJECTION INTRAVENOUS at 23:08

## 2024-01-01 RX ADMIN — Medication 6 MG: at 21:14

## 2024-01-01 RX ADMIN — ENOXAPARIN SODIUM 40 MG: 100 INJECTION SUBCUTANEOUS at 21:31

## 2024-01-01 RX ADMIN — AZITHROMYCIN 500 MG: 250 TABLET, FILM COATED ORAL at 21:13

## 2024-01-01 RX ADMIN — PRAMIPEXOLE DIHYDROCHLORIDE 0.5 MG: 0.25 TABLET ORAL at 21:13

## 2024-01-01 RX ADMIN — SODIUM CHLORIDE 1000 ML: 9 INJECTION, SOLUTION INTRAVENOUS at 14:34

## 2024-01-01 RX ADMIN — AZITHROMYCIN 500 MG: 250 TABLET, FILM COATED ORAL at 22:19

## 2024-01-01 RX ADMIN — IPRATROPIUM BROMIDE AND ALBUTEROL SULFATE 1 DOSE: .5; 2.5 SOLUTION RESPIRATORY (INHALATION) at 21:04

## 2024-01-01 RX ADMIN — SODIUM CHLORIDE 1000 ML: 9 INJECTION, SOLUTION INTRAVENOUS at 19:46

## 2024-01-01 RX ADMIN — METOPROLOL TARTRATE 5 MG: 5 INJECTION INTRAVENOUS at 17:32

## 2024-01-01 RX ADMIN — Medication 6 MG: at 22:19

## 2024-01-01 RX ADMIN — SODIUM CHLORIDE: 9 INJECTION, SOLUTION INTRAVENOUS at 10:54

## 2024-01-01 RX ADMIN — SODIUM CHLORIDE 500 ML: 9 INJECTION, SOLUTION INTRAVENOUS at 17:50

## 2024-01-01 RX ADMIN — DOXYCYCLINE 100 MG: 100 INJECTION, POWDER, LYOPHILIZED, FOR SOLUTION INTRAVENOUS at 15:14

## 2024-01-01 RX ADMIN — LEVALBUTEROL 0.63 MG: 0.63 SOLUTION RESPIRATORY (INHALATION) at 17:11

## 2024-01-01 RX ADMIN — SODIUM CHLORIDE SOLN NEBU 3% 4 ML: 3 NEBU SOLN at 21:04

## 2024-01-01 RX ADMIN — FAMOTIDINE 20 MG: 20 TABLET, FILM COATED ORAL at 10:53

## 2024-01-01 RX ADMIN — SODIUM CHLORIDE: 9 INJECTION, SOLUTION INTRAVENOUS at 19:38

## 2024-01-01 RX ADMIN — METHYLPREDNISOLONE SODIUM SUCCINATE 125 MG: 125 INJECTION INTRAMUSCULAR; INTRAVENOUS at 17:17

## 2024-01-01 RX ADMIN — SODIUM CHLORIDE, PRESERVATIVE FREE 10 ML: 5 INJECTION INTRAVENOUS at 21:14

## 2024-01-01 RX ADMIN — ASPIRIN 81 MG: 81 TABLET, COATED ORAL at 22:19

## 2024-01-01 RX ADMIN — IPRATROPIUM BROMIDE AND ALBUTEROL SULFATE 1 DOSE: .5; 2.5 SOLUTION RESPIRATORY (INHALATION) at 20:41

## 2024-01-01 RX ADMIN — METOPROLOL TARTRATE 5 MG: 5 INJECTION INTRAVENOUS at 17:17

## 2024-01-01 RX ADMIN — PRAMIPEXOLE DIHYDROCHLORIDE 0.5 MG: 0.25 TABLET ORAL at 22:19

## 2024-01-01 RX ADMIN — ASPIRIN 81 MG: 81 TABLET, COATED ORAL at 21:14

## 2024-01-01 RX ADMIN — DOXAZOSIN 2 MG: 2 TABLET ORAL at 22:19

## 2024-01-01 RX ADMIN — IPRATROPIUM BROMIDE AND ALBUTEROL SULFATE 1 DOSE: .5; 2.5 SOLUTION RESPIRATORY (INHALATION) at 15:53

## 2024-01-01 RX ADMIN — AMPICILLIN SODIUM AND SULBACTAM SODIUM 3000 MG: 2; 1 INJECTION, POWDER, FOR SOLUTION INTRAMUSCULAR; INTRAVENOUS at 14:35

## 2024-01-01 RX ADMIN — AMPICILLIN SODIUM AND SULBACTAM SODIUM 3000 MG: 2; 1 INJECTION, POWDER, FOR SOLUTION INTRAMUSCULAR; INTRAVENOUS at 10:53

## 2024-01-01 RX ADMIN — ACETAMINOPHEN 650 MG: 325 TABLET ORAL at 19:14

## 2024-01-01 RX ADMIN — AMPICILLIN SODIUM AND SULBACTAM SODIUM 3000 MG: 2; 1 INJECTION, POWDER, FOR SOLUTION INTRAMUSCULAR; INTRAVENOUS at 21:33

## 2024-01-01 RX ADMIN — ONDANSETRON 4 MG: 2 INJECTION, SOLUTION INTRAMUSCULAR; INTRAVENOUS at 22:26

## 2024-01-01 RX ADMIN — AMPICILLIN SODIUM AND SULBACTAM SODIUM 3000 MG: 2; 1 INJECTION, POWDER, FOR SOLUTION INTRAMUSCULAR; INTRAVENOUS at 22:23

## 2024-01-01 ASSESSMENT — LIFESTYLE VARIABLES
HOW MANY STANDARD DRINKS CONTAINING ALCOHOL DO YOU HAVE ON A TYPICAL DAY: PATIENT DOES NOT DRINK
HOW OFTEN DO YOU HAVE A DRINK CONTAINING ALCOHOL: NEVER

## 2024-01-01 ASSESSMENT — PAIN - FUNCTIONAL ASSESSMENT: PAIN_FUNCTIONAL_ASSESSMENT: NONE - DENIES PAIN

## 2024-06-07 ENCOUNTER — APPOINTMENT (OUTPATIENT)
Dept: GENERAL RADIOLOGY | Age: 89
DRG: 871 | End: 2024-06-07
Payer: MEDICARE

## 2024-06-07 ENCOUNTER — APPOINTMENT (OUTPATIENT)
Dept: CT IMAGING | Age: 89
DRG: 871 | End: 2024-06-07
Payer: MEDICARE

## 2024-06-07 ENCOUNTER — HOSPITAL ENCOUNTER (EMERGENCY)
Age: 89
Discharge: HOME | DRG: 871 | End: 2024-06-08
Attending: STUDENT IN AN ORGANIZED HEALTH CARE EDUCATION/TRAINING PROGRAM
Payer: MEDICARE

## 2024-06-07 DIAGNOSIS — E86.0 DEHYDRATION: ICD-10-CM

## 2024-06-07 DIAGNOSIS — R40.4 TRANSIENT ALTERATION OF AWARENESS: Primary | ICD-10-CM

## 2024-06-07 LAB
ALBUMIN SERPL-MCNC: 3.9 G/DL (ref 3.5–5.2)
ALP SERPL-CCNC: 106 U/L (ref 40–129)
ALT SERPL-CCNC: 15 U/L (ref 0–40)
ANION GAP SERPL CALCULATED.3IONS-SCNC: 7 MMOL/L (ref 7–16)
AST SERPL-CCNC: 26 U/L (ref 0–39)
BASOPHILS # BLD: 0.01 K/UL (ref 0–0.2)
BASOPHILS NFR BLD: 0 % (ref 0–2)
BILIRUB SERPL-MCNC: 0.5 MG/DL (ref 0–1.2)
BILIRUB UR QL STRIP: NEGATIVE
BUN SERPL-MCNC: 18 MG/DL (ref 6–23)
CALCIUM SERPL-MCNC: 9.3 MG/DL (ref 8.6–10.2)
CHLORIDE SERPL-SCNC: 97 MMOL/L (ref 98–107)
CLARITY UR: CLEAR
CO2 SERPL-SCNC: 27 MMOL/L (ref 22–29)
COLOR UR: YELLOW
CREAT SERPL-MCNC: 0.6 MG/DL (ref 0.7–1.2)
EOSINOPHIL # BLD: 0.02 K/UL (ref 0.05–0.5)
EOSINOPHILS RELATIVE PERCENT: 1 % (ref 0–6)
ERYTHROCYTE [DISTWIDTH] IN BLOOD BY AUTOMATED COUNT: 13.9 % (ref 11.5–15)
GFR, ESTIMATED: 89 ML/MIN/1.73M2
GLUCOSE SERPL-MCNC: 108 MG/DL (ref 74–99)
GLUCOSE UR STRIP-MCNC: NEGATIVE MG/DL
HCT VFR BLD AUTO: 37.3 % (ref 37–54)
HGB BLD-MCNC: 12.2 G/DL (ref 12.5–16.5)
HGB UR QL STRIP.AUTO: NEGATIVE
IMM GRANULOCYTES # BLD AUTO: <0.03 K/UL (ref 0–0.58)
IMM GRANULOCYTES NFR BLD: 0 % (ref 0–5)
KETONES UR STRIP-MCNC: ABNORMAL MG/DL
LEUKOCYTE ESTERASE UR QL STRIP: NEGATIVE
LYMPHOCYTES NFR BLD: 0.31 K/UL (ref 1.5–4)
LYMPHOCYTES RELATIVE PERCENT: 9 % (ref 20–42)
MCH RBC QN AUTO: 31.5 PG (ref 26–35)
MCHC RBC AUTO-ENTMCNC: 32.7 G/DL (ref 32–34.5)
MCV RBC AUTO: 96.4 FL (ref 80–99.9)
MONOCYTES NFR BLD: 0.32 K/UL (ref 0.1–0.95)
MONOCYTES NFR BLD: 10 % (ref 2–12)
NEUTROPHILS NFR BLD: 80 % (ref 43–80)
NEUTS SEG NFR BLD: 2.69 K/UL (ref 1.8–7.3)
NITRITE UR QL STRIP: NEGATIVE
PH UR STRIP: 7 [PH] (ref 5–9)
PLATELET # BLD AUTO: 121 K/UL (ref 130–450)
PMV BLD AUTO: 9.4 FL (ref 7–12)
POTASSIUM SERPL-SCNC: 4.8 MMOL/L (ref 3.5–5)
PROT SERPL-MCNC: 7.2 G/DL (ref 6.4–8.3)
PROT UR STRIP-MCNC: NEGATIVE MG/DL
RBC # BLD AUTO: 3.87 M/UL (ref 3.8–5.8)
RBC # BLD: NORMAL 10*6/UL
RBC #/AREA URNS HPF: ABNORMAL /HPF
SARS-COV-2 RDRP RESP QL NAA+PROBE: NOT DETECTED
SODIUM SERPL-SCNC: 131 MMOL/L (ref 132–146)
SP GR UR STRIP: 1.01 (ref 1–1.03)
SPECIMEN DESCRIPTION: NORMAL
TROPONIN I SERPL HS-MCNC: 20 NG/L (ref 0–11)
TROPONIN I SERPL HS-MCNC: 20 NG/L (ref 0–11)
UROBILINOGEN UR STRIP-ACNC: 0.2 EU/DL (ref 0–1)
WBC #/AREA URNS HPF: ABNORMAL /HPF
WBC OTHER # BLD: 3.4 K/UL (ref 4.5–11.5)

## 2024-06-07 PROCEDURE — 2580000003 HC RX 258

## 2024-06-07 PROCEDURE — 96360 HYDRATION IV INFUSION INIT: CPT

## 2024-06-07 PROCEDURE — 6370000000 HC RX 637 (ALT 250 FOR IP)

## 2024-06-07 PROCEDURE — 80053 COMPREHEN METABOLIC PANEL: CPT

## 2024-06-07 PROCEDURE — 71045 X-RAY EXAM CHEST 1 VIEW: CPT

## 2024-06-07 PROCEDURE — 81001 URINALYSIS AUTO W/SCOPE: CPT

## 2024-06-07 PROCEDURE — 93005 ELECTROCARDIOGRAM TRACING: CPT

## 2024-06-07 PROCEDURE — 96361 HYDRATE IV INFUSION ADD-ON: CPT

## 2024-06-07 PROCEDURE — 70450 CT HEAD/BRAIN W/O DYE: CPT

## 2024-06-07 PROCEDURE — 87635 SARS-COV-2 COVID-19 AMP PRB: CPT

## 2024-06-07 PROCEDURE — 85025 COMPLETE CBC W/AUTO DIFF WBC: CPT

## 2024-06-07 PROCEDURE — 99285 EMERGENCY DEPT VISIT HI MDM: CPT

## 2024-06-07 PROCEDURE — 84484 ASSAY OF TROPONIN QUANT: CPT

## 2024-06-07 RX ORDER — 0.9 % SODIUM CHLORIDE 0.9 %
1000 INTRAVENOUS SOLUTION INTRAVENOUS ONCE
Status: COMPLETED | OUTPATIENT
Start: 2024-06-07 | End: 2024-06-08

## 2024-06-07 RX ORDER — PRAMIPEXOLE DIHYDROCHLORIDE 0.25 MG/1
0.5 TABLET ORAL 3 TIMES DAILY
Status: DISCONTINUED | OUTPATIENT
Start: 2024-06-07 | End: 2024-06-08 | Stop reason: HOSPADM

## 2024-06-07 RX ORDER — 0.9 % SODIUM CHLORIDE 0.9 %
500 INTRAVENOUS SOLUTION INTRAVENOUS ONCE
Status: COMPLETED | OUTPATIENT
Start: 2024-06-07 | End: 2024-06-07

## 2024-06-07 RX ADMIN — SODIUM CHLORIDE 1000 ML: 9 INJECTION, SOLUTION INTRAVENOUS at 23:37

## 2024-06-07 RX ADMIN — SODIUM CHLORIDE 500 ML: 9 INJECTION, SOLUTION INTRAVENOUS at 20:50

## 2024-06-07 RX ADMIN — PRAMIPEXOLE DIHYDROCHLORIDE 0.5 MG: 0.25 TABLET ORAL at 23:58

## 2024-06-07 ASSESSMENT — LIFESTYLE VARIABLES
HOW OFTEN DO YOU HAVE A DRINK CONTAINING ALCOHOL: MONTHLY OR LESS
HOW MANY STANDARD DRINKS CONTAINING ALCOHOL DO YOU HAVE ON A TYPICAL DAY: 1 OR 2

## 2024-06-08 VITALS
HEART RATE: 76 BPM | RESPIRATION RATE: 16 BRPM | OXYGEN SATURATION: 96 % | SYSTOLIC BLOOD PRESSURE: 119 MMHG | WEIGHT: 128 LBS | BODY MASS INDEX: 17.92 KG/M2 | DIASTOLIC BLOOD PRESSURE: 54 MMHG | HEIGHT: 71 IN | TEMPERATURE: 99.1 F

## 2024-06-08 LAB
EKG ATRIAL RATE: 75 BPM
EKG P AXIS: 48 DEGREES
EKG P-R INTERVAL: 178 MS
EKG Q-T INTERVAL: 414 MS
EKG QRS DURATION: 146 MS
EKG QTC CALCULATION (BAZETT): 462 MS
EKG R AXIS: -52 DEGREES
EKG T AXIS: 97 DEGREES
EKG VENTRICULAR RATE: 75 BPM

## 2024-06-08 NOTE — DISCHARGE INSTR - COC
MANAGEMENT/SOCIAL WORK SECTION    Inpatient Status Date: ***    Readmission Risk Assessment Score:  Readmission Risk              Risk of Unplanned Readmission:  0           Discharging to Facility/ Agency   Name:   Address:  Phone:  Fax:    Dialysis Facility (if applicable)   Name:  Address:  Dialysis Schedule:  Phone:  Fax:    / signature: {Esignature:[id number]}    PHYSICIAN SECTION    Prognosis: {Prognosis:0844697936}    Condition at Discharge: { Patient Condition:634170798}    Rehab Potential (if transferring to Rehab): {Prognosis:7388411779}    Recommended Labs or Other Treatments After Discharge: ***    Physician Certification: I certify the above information and transfer of Sandip Abdi  is necessary for the continuing treatment of the diagnosis listed and that he requires {Admit to Appropriate Level of Care:21051} for {GREATER/LESS:279403050} 30 days.     Update Admission H&P: {CHP DME Changes in HandP:352055102}    PHYSICIAN SIGNATURE:  {Esignature:[id number]}

## 2024-06-08 NOTE — ED PROVIDER NOTES
[facility] EMERGENCY DEPARTMENT  EMERGENCY DEPARTMENT ENCOUNTER        Pt Name: Sandip Abdi  MRN: [id number]  Birthdate 8/5/1931  Date of evaluation: 6/7/2024  Provider: Connor Vega MD  Attending Provider: No att. providers found  PCP: Gary Cleveland DO  Note Started: 8:05 PM EDT 6/7/24    CHIEF COMPLAINT       Chief Complaint   Patient presents with    Altered Mental Status     confusion, per patients daughter patient had a feeding tube daughter thinks patient has not had a feed since this morning at 10am. Dx with UTI recently and has been on abx       HISTORY OF PRESENT ILLNESS: 1 or more Elements   History From: The patient's daughter        Sandip Abdi is a 89 y.o. male with a past medical history of hypertension, Raynaud's disease, carotid atherosclerosis, Lea's esophagus status post PEG tube placement presenting with altered mental status.  Last known well approximately 1300 on 6/7.  Patient's daughter reports that when clinic patient this evening around 1730, patient was seemingly more confused than normal.  On their arrival to his house, the patient had not completed his typical feeds and he only finished 2 bottles of his feeds.  He typically manages this on his own and is very methodical and has not missed any feeds for several months.  He has had a PEG tube for 2 years.  He does not take any medications or food by mouth.  The last saw the patient in person on 6/6 in the evening and the patient was acting appropriately but reportedly more tired than normal.  Recently typically has an aide that assist him in the morning that leads around 1300 and according to the ED, the patient was acting appropriately at that time.  Patient is being treated for urinary tract infection with Macrobid and has 3 doses left.    Nursing Notes were all reviewed and agreed with or any disagreements were addressed in the HPI.      REVIEW OF EXTERNAL NOTE :       5/23/24 Urology  What was discussed)  None        I am the Primary Clinician of Record.    FINAL IMPRESSION      1. Transient alteration of awareness    2. Dehydration          DISPOSITION/PLAN     DISPOSITION Decision To Discharge 06/08/2024 12:32:56 AM      PATIENT REFERRED TO:  Gary Cleveland DO  [address] [place] [address] [phone]    In 2 days      [facility] Emergency Department  [address] [address] [phone]    If symptoms worsen      DISCHARGE MEDICATIONS:  Discharge Medication List as of 6/8/2024 12:35 AM          DISCONTINUED MEDICATIONS:  Discharge Medication List as of 6/8/2024 12:35 AM                 Patient was seen and evaluated by myself and my attending No att. providers found. Assessment and Plan discussed with attending provider, please see attestation for final plan of care.     (Please note that portions of this note were completed with a voice recognition program.  Efforts were made to edit the dictations but occasionally words are mis-transcribed.)    Connor Vega MD (electronically signed)

## 2024-06-09 ENCOUNTER — APPOINTMENT (OUTPATIENT)
Dept: CT IMAGING | Age: 89
DRG: 871 | End: 2024-06-09
Payer: MEDICARE

## 2024-06-09 ENCOUNTER — APPOINTMENT (OUTPATIENT)
Dept: GENERAL RADIOLOGY | Age: 89
DRG: 871 | End: 2024-06-09
Payer: MEDICARE

## 2024-06-09 ENCOUNTER — HOSPITAL ENCOUNTER (INPATIENT)
Age: 89
LOS: 3 days | Discharge: NURSING, REHAB OR LONG-TERM CARE | DRG: 871 | End: 2024-06-12
Attending: EMERGENCY MEDICINE | Admitting: INTERNAL MEDICINE
Payer: MEDICARE

## 2024-06-09 DIAGNOSIS — R00.0 WIDE-COMPLEX TACHYCARDIA: ICD-10-CM

## 2024-06-09 DIAGNOSIS — A41.9 SEPSIS, DUE TO UNSPECIFIED ORGANISM, UNSPECIFIED WHETHER ACUTE ORGAN DYSFUNCTION PRESENT (HCC): Primary | ICD-10-CM

## 2024-06-09 DIAGNOSIS — J18.9 PNEUMONIA OF LEFT LUNG DUE TO INFECTIOUS ORGANISM, UNSPECIFIED PART OF LUNG: ICD-10-CM

## 2024-06-09 LAB
ALBUMIN SERPL-MCNC: 3.4 G/DL (ref 3.5–5.2)
ALP SERPL-CCNC: 92 U/L (ref 40–129)
ALT SERPL-CCNC: 16 U/L (ref 0–40)
ANION GAP SERPL CALCULATED.3IONS-SCNC: 8 MMOL/L (ref 7–16)
AST SERPL-CCNC: 27 U/L (ref 0–39)
BACTERIA URNS QL MICRO: ABNORMAL
BASOPHILS # BLD: 0 K/UL (ref 0–0.2)
BASOPHILS NFR BLD: 0 % (ref 0–2)
BILIRUB SERPL-MCNC: 0.4 MG/DL (ref 0–1.2)
BILIRUB UR QL STRIP: NEGATIVE
BNP SERPL-MCNC: 5084 PG/ML (ref 0–450)
BUN SERPL-MCNC: 22 MG/DL (ref 6–23)
CALCIUM SERPL-MCNC: 8.4 MG/DL (ref 8.6–10.2)
CHLORIDE SERPL-SCNC: 96 MMOL/L (ref 98–107)
CK SERPL-CCNC: 526 U/L (ref 20–200)
CLARITY UR: CLEAR
CO2 SERPL-SCNC: 24 MMOL/L (ref 22–29)
COLOR UR: YELLOW
CREAT SERPL-MCNC: 0.6 MG/DL (ref 0.7–1.2)
EOSINOPHIL # BLD: 0 K/UL (ref 0.05–0.5)
EOSINOPHILS RELATIVE PERCENT: 0 % (ref 0–6)
ERYTHROCYTE [DISTWIDTH] IN BLOOD BY AUTOMATED COUNT: 14 % (ref 11.5–15)
GFR, ESTIMATED: >90 ML/MIN/1.73M2
GLUCOSE SERPL-MCNC: 142 MG/DL (ref 74–99)
GLUCOSE UR STRIP-MCNC: NEGATIVE MG/DL
HCT VFR BLD AUTO: 34.1 % (ref 37–54)
HGB BLD-MCNC: 11.2 G/DL (ref 12.5–16.5)
HGB UR QL STRIP.AUTO: ABNORMAL
KETONES UR STRIP-MCNC: NEGATIVE MG/DL
LACTATE BLDV-SCNC: 2.2 MMOL/L (ref 0.5–2.2)
LEUKOCYTE ESTERASE UR QL STRIP: ABNORMAL
LYMPHOCYTES NFR BLD: 0.28 K/UL (ref 1.5–4)
LYMPHOCYTES RELATIVE PERCENT: 8 % (ref 20–42)
MAGNESIUM SERPL-MCNC: 1.6 MG/DL (ref 1.6–2.6)
MCH RBC QN AUTO: 31.4 PG (ref 26–35)
MCHC RBC AUTO-ENTMCNC: 32.8 G/DL (ref 32–34.5)
MCV RBC AUTO: 95.5 FL (ref 80–99.9)
METAMYELOCYTES ABSOLUTE COUNT: 0.03 K/UL (ref 0–0.12)
METAMYELOCYTES: 1 % (ref 0–1)
MONOCYTES NFR BLD: 0.03 K/UL (ref 0.1–0.95)
MONOCYTES NFR BLD: 1 % (ref 2–12)
NEUTROPHILS NFR BLD: 90 % (ref 43–80)
NEUTS SEG NFR BLD: 3.25 K/UL (ref 1.8–7.3)
NITRITE UR QL STRIP: NEGATIVE
PH UR STRIP: 7 [PH] (ref 5–9)
PLATELET, FLUORESCENCE: 115 K/UL (ref 130–450)
PMV BLD AUTO: 9.6 FL (ref 7–12)
POTASSIUM SERPL-SCNC: 4.6 MMOL/L (ref 3.5–5)
PROT SERPL-MCNC: 6.6 G/DL (ref 6.4–8.3)
PROT UR STRIP-MCNC: ABNORMAL MG/DL
RBC # BLD AUTO: 3.57 M/UL (ref 3.8–5.8)
RBC # BLD: NORMAL 10*6/UL
RBC #/AREA URNS HPF: ABNORMAL /HPF
SODIUM SERPL-SCNC: 128 MMOL/L (ref 132–146)
SP GR UR STRIP: 1.02 (ref 1–1.03)
TROPONIN I SERPL HS-MCNC: 107 NG/L (ref 0–11)
TROPONIN I SERPL HS-MCNC: 448 NG/L (ref 0–11)
UROBILINOGEN UR STRIP-ACNC: 0.2 EU/DL (ref 0–1)
WBC #/AREA URNS HPF: ABNORMAL /HPF
WBC OTHER # BLD: 3.6 K/UL (ref 4.5–11.5)

## 2024-06-09 PROCEDURE — 99291 CRITICAL CARE FIRST HOUR: CPT

## 2024-06-09 PROCEDURE — 87899 AGENT NOS ASSAY W/OPTIC: CPT

## 2024-06-09 PROCEDURE — 87040 BLOOD CULTURE FOR BACTERIA: CPT

## 2024-06-09 PROCEDURE — 6360000004 HC RX CONTRAST MEDICATION: Performed by: RADIOLOGY

## 2024-06-09 PROCEDURE — 83735 ASSAY OF MAGNESIUM: CPT

## 2024-06-09 PROCEDURE — 82533 TOTAL CORTISOL: CPT

## 2024-06-09 PROCEDURE — 2000000000 HC ICU R&B

## 2024-06-09 PROCEDURE — 96361 HYDRATE IV INFUSION ADD-ON: CPT

## 2024-06-09 PROCEDURE — 96375 TX/PRO/DX INJ NEW DRUG ADDON: CPT

## 2024-06-09 PROCEDURE — 6370000000 HC RX 637 (ALT 250 FOR IP)

## 2024-06-09 PROCEDURE — 84145 PROCALCITONIN (PCT): CPT

## 2024-06-09 PROCEDURE — 83605 ASSAY OF LACTIC ACID: CPT

## 2024-06-09 PROCEDURE — 99285 EMERGENCY DEPT VISIT HI MDM: CPT

## 2024-06-09 PROCEDURE — 87086 URINE CULTURE/COLONY COUNT: CPT

## 2024-06-09 PROCEDURE — 2580000003 HC RX 258

## 2024-06-09 PROCEDURE — 6360000002 HC RX W HCPCS

## 2024-06-09 PROCEDURE — 2580000003 HC RX 258: Performed by: EMERGENCY MEDICINE

## 2024-06-09 PROCEDURE — 96365 THER/PROPH/DIAG IV INF INIT: CPT

## 2024-06-09 PROCEDURE — 84484 ASSAY OF TROPONIN QUANT: CPT

## 2024-06-09 PROCEDURE — 02HV33Z INSERTION OF INFUSION DEVICE INTO SUPERIOR VENA CAVA, PERCUTANEOUS APPROACH: ICD-10-PCS | Performed by: INTERNAL MEDICINE

## 2024-06-09 PROCEDURE — 3E033XZ INTRODUCTION OF VASOPRESSOR INTO PERIPHERAL VEIN, PERCUTANEOUS APPROACH: ICD-10-PCS | Performed by: EMERGENCY MEDICINE

## 2024-06-09 PROCEDURE — 2500000003 HC RX 250 WO HCPCS: Performed by: EMERGENCY MEDICINE

## 2024-06-09 PROCEDURE — 82550 ASSAY OF CK (CPK): CPT

## 2024-06-09 PROCEDURE — 85025 COMPLETE CBC W/AUTO DIFF WBC: CPT

## 2024-06-09 PROCEDURE — 86140 C-REACTIVE PROTEIN: CPT

## 2024-06-09 PROCEDURE — 2500000003 HC RX 250 WO HCPCS

## 2024-06-09 PROCEDURE — 81001 URINALYSIS AUTO W/SCOPE: CPT

## 2024-06-09 PROCEDURE — 6360000002 HC RX W HCPCS: Performed by: EMERGENCY MEDICINE

## 2024-06-09 PROCEDURE — 71045 X-RAY EXAM CHEST 1 VIEW: CPT

## 2024-06-09 PROCEDURE — 80053 COMPREHEN METABOLIC PANEL: CPT

## 2024-06-09 PROCEDURE — 71275 CT ANGIOGRAPHY CHEST: CPT

## 2024-06-09 PROCEDURE — 87449 NOS EACH ORGANISM AG IA: CPT

## 2024-06-09 PROCEDURE — 83880 ASSAY OF NATRIURETIC PEPTIDE: CPT

## 2024-06-09 RX ORDER — ONDANSETRON 4 MG/1
4 TABLET, ORALLY DISINTEGRATING ORAL EVERY 8 HOURS PRN
Status: DISCONTINUED | OUTPATIENT
Start: 2024-06-09 | End: 2024-06-12 | Stop reason: HOSPADM

## 2024-06-09 RX ORDER — ACETAMINOPHEN 160 MG/5ML
1000 LIQUID ORAL ONCE
Status: COMPLETED | OUTPATIENT
Start: 2024-06-09 | End: 2024-06-09

## 2024-06-09 RX ORDER — ASPIRIN 81 MG/1
81 TABLET ORAL DAILY
Status: DISCONTINUED | OUTPATIENT
Start: 2024-06-09 | End: 2024-06-12 | Stop reason: HOSPADM

## 2024-06-09 RX ORDER — SODIUM CHLORIDE 0.9 % (FLUSH) 0.9 %
10 SYRINGE (ML) INJECTION EVERY 12 HOURS SCHEDULED
Status: DISCONTINUED | OUTPATIENT
Start: 2024-06-09 | End: 2024-06-12 | Stop reason: HOSPADM

## 2024-06-09 RX ORDER — SODIUM CHLORIDE 9 MG/ML
INJECTION, SOLUTION INTRAVENOUS CONTINUOUS
Status: DISCONTINUED | OUTPATIENT
Start: 2024-06-09 | End: 2024-06-11

## 2024-06-09 RX ORDER — SODIUM CHLORIDE 9 MG/ML
INJECTION, SOLUTION INTRAVENOUS PRN
Status: DISCONTINUED | OUTPATIENT
Start: 2024-06-09 | End: 2024-06-12 | Stop reason: HOSPADM

## 2024-06-09 RX ORDER — LANOLIN ALCOHOL/MO/W.PET/CERES
3 CREAM (GRAM) TOPICAL NIGHTLY
Status: DISCONTINUED | OUTPATIENT
Start: 2024-06-09 | End: 2024-06-12 | Stop reason: HOSPADM

## 2024-06-09 RX ORDER — LIDOCAINE HYDROCHLORIDE 10 MG/ML
50 INJECTION, SOLUTION INTRAVENOUS ONCE
Status: DISCONTINUED | OUTPATIENT
Start: 2024-06-09 | End: 2024-06-09

## 2024-06-09 RX ORDER — SENNOSIDES A AND B 8.6 MG/1
1 TABLET, FILM COATED ORAL DAILY PRN
Status: DISCONTINUED | OUTPATIENT
Start: 2024-06-09 | End: 2024-06-12 | Stop reason: HOSPADM

## 2024-06-09 RX ORDER — 0.9 % SODIUM CHLORIDE 0.9 %
500 INTRAVENOUS SOLUTION INTRAVENOUS ONCE
Status: COMPLETED | OUTPATIENT
Start: 2024-06-09 | End: 2024-06-09

## 2024-06-09 RX ORDER — LEVOTHYROXINE SODIUM 0.1 MG/1
100 TABLET ORAL DAILY
Status: DISCONTINUED | OUTPATIENT
Start: 2024-06-10 | End: 2024-06-12 | Stop reason: HOSPADM

## 2024-06-09 RX ORDER — POTASSIUM CHLORIDE 7.45 MG/ML
10 INJECTION INTRAVENOUS PRN
Status: DISCONTINUED | OUTPATIENT
Start: 2024-06-09 | End: 2024-06-12 | Stop reason: HOSPADM

## 2024-06-09 RX ORDER — MAGNESIUM SULFATE IN WATER 40 MG/ML
2000 INJECTION, SOLUTION INTRAVENOUS PRN
Status: DISCONTINUED | OUTPATIENT
Start: 2024-06-09 | End: 2024-06-12 | Stop reason: HOSPADM

## 2024-06-09 RX ORDER — ENOXAPARIN SODIUM 100 MG/ML
40 INJECTION SUBCUTANEOUS DAILY
Status: DISCONTINUED | OUTPATIENT
Start: 2024-06-09 | End: 2024-06-12 | Stop reason: HOSPADM

## 2024-06-09 RX ORDER — PRAMIPEXOLE DIHYDROCHLORIDE 0.25 MG/1
0.5 TABLET ORAL NIGHTLY
Status: DISCONTINUED | OUTPATIENT
Start: 2024-06-09 | End: 2024-06-12 | Stop reason: HOSPADM

## 2024-06-09 RX ORDER — AMIODARONE HYDROCHLORIDE 150 MG/3ML
150 INJECTION, SOLUTION INTRAVENOUS ONCE
Status: COMPLETED | OUTPATIENT
Start: 2024-06-09 | End: 2024-06-09

## 2024-06-09 RX ORDER — 0.9 % SODIUM CHLORIDE 0.9 %
1000 INTRAVENOUS SOLUTION INTRAVENOUS ONCE
Status: COMPLETED | OUTPATIENT
Start: 2024-06-09 | End: 2024-06-09

## 2024-06-09 RX ORDER — PANTOPRAZOLE SODIUM 20 MG/1
20 TABLET, DELAYED RELEASE ORAL EVERY OTHER DAY
Status: DISCONTINUED | OUTPATIENT
Start: 2024-06-09 | End: 2024-06-09

## 2024-06-09 RX ORDER — CETIRIZINE HYDROCHLORIDE 10 MG/1
5 TABLET ORAL DAILY
Status: DISCONTINUED | OUTPATIENT
Start: 2024-06-09 | End: 2024-06-12 | Stop reason: HOSPADM

## 2024-06-09 RX ORDER — SODIUM CHLORIDE 0.9 % (FLUSH) 0.9 %
10 SYRINGE (ML) INJECTION PRN
Status: DISCONTINUED | OUTPATIENT
Start: 2024-06-09 | End: 2024-06-12 | Stop reason: HOSPADM

## 2024-06-09 RX ORDER — AMLODIPINE BESYLATE 2.5 MG/1
2.5 TABLET ORAL NIGHTLY
Status: DISCONTINUED | OUTPATIENT
Start: 2024-06-09 | End: 2024-06-12 | Stop reason: HOSPADM

## 2024-06-09 RX ORDER — PANTOPRAZOLE SODIUM 40 MG/1
40 TABLET, DELAYED RELEASE ORAL
Status: DISCONTINUED | OUTPATIENT
Start: 2024-06-10 | End: 2024-06-10

## 2024-06-09 RX ORDER — POTASSIUM CHLORIDE 20 MEQ/1
40 TABLET, EXTENDED RELEASE ORAL PRN
Status: DISCONTINUED | OUTPATIENT
Start: 2024-06-09 | End: 2024-06-12 | Stop reason: HOSPADM

## 2024-06-09 RX ORDER — ACETAMINOPHEN 650 MG/1
650 SUPPOSITORY RECTAL EVERY 6 HOURS PRN
Status: DISCONTINUED | OUTPATIENT
Start: 2024-06-09 | End: 2024-06-12 | Stop reason: HOSPADM

## 2024-06-09 RX ORDER — ONDANSETRON 2 MG/ML
4 INJECTION INTRAMUSCULAR; INTRAVENOUS EVERY 6 HOURS PRN
Status: DISCONTINUED | OUTPATIENT
Start: 2024-06-09 | End: 2024-06-12 | Stop reason: HOSPADM

## 2024-06-09 RX ORDER — MAGNESIUM SULFATE IN WATER 40 MG/ML
2000 INJECTION, SOLUTION INTRAVENOUS ONCE
Status: COMPLETED | OUTPATIENT
Start: 2024-06-09 | End: 2024-06-10

## 2024-06-09 RX ORDER — ACETAMINOPHEN 325 MG/1
650 TABLET ORAL EVERY 6 HOURS PRN
Status: DISCONTINUED | OUTPATIENT
Start: 2024-06-09 | End: 2024-06-12 | Stop reason: HOSPADM

## 2024-06-09 RX ADMIN — SODIUM CHLORIDE 1000 ML: 9 INJECTION, SOLUTION INTRAVENOUS at 17:17

## 2024-06-09 RX ADMIN — IOPAMIDOL 75 ML: 755 INJECTION, SOLUTION INTRAVENOUS at 20:34

## 2024-06-09 RX ADMIN — AMIODARONE HYDROCHLORIDE 1 MG/MIN: 50 INJECTION, SOLUTION INTRAVENOUS at 19:00

## 2024-06-09 RX ADMIN — SODIUM CHLORIDE 500 ML: 9 INJECTION, SOLUTION INTRAVENOUS at 21:20

## 2024-06-09 RX ADMIN — SODIUM CHLORIDE 5 MCG/MIN: 9 INJECTION, SOLUTION INTRAVENOUS at 22:22

## 2024-06-09 RX ADMIN — WATER 2000 MG: 1 INJECTION INTRAMUSCULAR; INTRAVENOUS; SUBCUTANEOUS at 18:18

## 2024-06-09 RX ADMIN — ACETAMINOPHEN 1000 MG: 650 SOLUTION ORAL at 19:23

## 2024-06-09 RX ADMIN — AMIODARONE HYDROCHLORIDE 150 MG: 50 INJECTION, SOLUTION INTRAVENOUS at 18:38

## 2024-06-09 RX ADMIN — SODIUM CHLORIDE 500 ML: 9 INJECTION, SOLUTION INTRAVENOUS at 18:44

## 2024-06-09 RX ADMIN — DOXYCYCLINE 100 MG: 100 INJECTION, POWDER, LYOPHILIZED, FOR SOLUTION INTRAVENOUS at 18:24

## 2024-06-09 NOTE — ED PROVIDER NOTES
with intensivist, admit to icu [AT]      ED Course User Index  [AT] Jun Varela MD  [RW] Lico Quinones DO        Social Determinants affecting Dx or Tx: Patient has good medical compliance and fluency as well as established follow-up with family physician.    Records Reviewed: EKG from 6/7/2024 reviewed in comparison to today's.    I am the Primary Clinician of Record.    CONSULTS: (Who and What was discussed)  IP CONSULT TO CARDIOLOGY  IP CONSULT TO INTERNAL MEDICINE  IP CONSULT TO CRITICAL CARE    FINAL IMPRESSION      1. Sepsis, due to unspecified organism, unspecified whether acute organ dysfunction present (HCC)    2. Pneumonia of left lung due to infectious organism, unspecified part of lung    3. Wide-complex tachycardia          DISPOSITION/PLAN     DISPOSITION Admitted 06/09/2024 10:34:12 PM    PATIENT REFERRED TO:  No follow-up provider specified.    DISCHARGE MEDICATIONS:  Current Discharge Medication List               (Please note that portions of this note were completed with a voice recognition program.  Efforts were made to edit the dictations but occasionally words are mis-transcribed.)    Lico Quinones DO (electronically signed)

## 2024-06-09 NOTE — ED NOTES
Upon receiving report from MANJIT Hanks, patients O2 sat was low at 87-88% RA. RN assessed levels for a few moments before supplementing patient on 2L NC.

## 2024-06-10 PROBLEM — I21.4 NSTEMI (NON-ST ELEVATED MYOCARDIAL INFARCTION) (HCC): Status: ACTIVE | Noted: 2024-06-10

## 2024-06-10 PROBLEM — K22.70 BARRETT'S ESOPHAGUS: Status: ACTIVE | Noted: 2024-06-10

## 2024-06-10 PROBLEM — E43 SEVERE PROTEIN-CALORIE MALNUTRITION (HCC): Status: ACTIVE | Noted: 2024-06-10

## 2024-06-10 PROBLEM — A41.9 SEPSIS (HCC): Status: ACTIVE | Noted: 2024-06-10

## 2024-06-10 PROBLEM — I47.20 VENTRICULAR TACHYCARDIA (HCC): Status: ACTIVE | Noted: 2024-06-10

## 2024-06-10 PROBLEM — H91.90 HOH (HARD OF HEARING): Status: ACTIVE | Noted: 2024-06-10

## 2024-06-10 LAB
ANION GAP SERPL CALCULATED.3IONS-SCNC: 5 MMOL/L (ref 7–16)
BASOPHILS # BLD: 0.01 K/UL (ref 0–0.2)
BASOPHILS NFR BLD: 0 % (ref 0–2)
BUN SERPL-MCNC: 17 MG/DL (ref 6–23)
CALCIUM SERPL-MCNC: 7.9 MG/DL (ref 8.6–10.2)
CHLORIDE SERPL-SCNC: 101 MMOL/L (ref 98–107)
CHOLEST SERPL-MCNC: 103 MG/DL
CHOLEST SERPL-MCNC: 104 MG/DL
CO2 SERPL-SCNC: 25 MMOL/L (ref 22–29)
CREAT SERPL-MCNC: 0.6 MG/DL (ref 0.7–1.2)
CRP SERPL HS-MCNC: 45 MG/L (ref 0–5)
EOSINOPHIL # BLD: 0.03 K/UL (ref 0.05–0.5)
EOSINOPHILS RELATIVE PERCENT: 1 % (ref 0–6)
ERYTHROCYTE [DISTWIDTH] IN BLOOD BY AUTOMATED COUNT: 14 % (ref 11.5–15)
GFR, ESTIMATED: 89 ML/MIN/1.73M2
GLUCOSE SERPL-MCNC: 115 MG/DL (ref 74–99)
HCT VFR BLD AUTO: 31.5 % (ref 37–54)
HDLC SERPL-MCNC: 37 MG/DL
HDLC SERPL-MCNC: 39 MG/DL
HGB BLD-MCNC: 10.2 G/DL (ref 12.5–16.5)
IMM GRANULOCYTES # BLD AUTO: <0.03 K/UL (ref 0–0.58)
IMM GRANULOCYTES NFR BLD: 1 % (ref 0–5)
LACTATE BLDV-SCNC: 1 MMOL/L (ref 0.5–2.2)
LDLC SERPL CALC-MCNC: 57 MG/DL
LDLC SERPL CALC-MCNC: 58 MG/DL
LYMPHOCYTES NFR BLD: 0.62 K/UL (ref 1.5–4)
LYMPHOCYTES RELATIVE PERCENT: 22 % (ref 20–42)
MAGNESIUM SERPL-MCNC: 2.2 MG/DL (ref 1.6–2.6)
MCH RBC QN AUTO: 32 PG (ref 26–35)
MCHC RBC AUTO-ENTMCNC: 32.4 G/DL (ref 32–34.5)
MCV RBC AUTO: 98.7 FL (ref 80–99.9)
MONOCYTES NFR BLD: 0.4 K/UL (ref 0.1–0.95)
MONOCYTES NFR BLD: 14 % (ref 2–12)
NEUTROPHILS NFR BLD: 62 % (ref 43–80)
NEUTS SEG NFR BLD: 1.75 K/UL (ref 1.8–7.3)
PHOSPHATE SERPL-MCNC: 2.8 MG/DL (ref 2.5–4.5)
PLATELET CONFIRMATION: NORMAL
PLATELET, FLUORESCENCE: 98 K/UL (ref 130–450)
PMV BLD AUTO: 10 FL (ref 7–12)
POTASSIUM SERPL-SCNC: 3.6 MMOL/L (ref 3.5–5)
PROCALCITONIN SERPL-MCNC: 0.27 NG/ML (ref 0–0.08)
PROCALCITONIN SERPL-MCNC: 0.31 NG/ML (ref 0–0.08)
RBC # BLD AUTO: 3.19 M/UL (ref 3.8–5.8)
SODIUM SERPL-SCNC: 131 MMOL/L (ref 132–146)
T4 FREE SERPL-MCNC: 1.2 NG/DL (ref 0.9–1.7)
TRIGL SERPL-MCNC: 41 MG/DL
TRIGL SERPL-MCNC: 41 MG/DL
TROPONIN I SERPL HS-MCNC: 592 NG/L (ref 0–11)
TSH SERPL DL<=0.05 MIU/L-ACNC: 1.72 UIU/ML (ref 0.27–4.2)
VLDLC SERPL CALC-MCNC: 8 MG/DL
VLDLC SERPL CALC-MCNC: 8 MG/DL
WBC OTHER # BLD: 2.8 K/UL (ref 4.5–11.5)

## 2024-06-10 PROCEDURE — 6370000000 HC RX 637 (ALT 250 FOR IP): Performed by: NURSE PRACTITIONER

## 2024-06-10 PROCEDURE — 2580000003 HC RX 258: Performed by: NURSE PRACTITIONER

## 2024-06-10 PROCEDURE — 80061 LIPID PANEL: CPT

## 2024-06-10 PROCEDURE — 80048 BASIC METABOLIC PNL TOTAL CA: CPT

## 2024-06-10 PROCEDURE — 6360000002 HC RX W HCPCS: Performed by: INTERNAL MEDICINE

## 2024-06-10 PROCEDURE — 83735 ASSAY OF MAGNESIUM: CPT

## 2024-06-10 PROCEDURE — 6360000002 HC RX W HCPCS: Performed by: NURSE PRACTITIONER

## 2024-06-10 PROCEDURE — 99223 1ST HOSP IP/OBS HIGH 75: CPT | Performed by: INTERNAL MEDICINE

## 2024-06-10 PROCEDURE — 84484 ASSAY OF TROPONIN QUANT: CPT

## 2024-06-10 PROCEDURE — 83605 ASSAY OF LACTIC ACID: CPT

## 2024-06-10 PROCEDURE — 84443 ASSAY THYROID STIM HORMONE: CPT

## 2024-06-10 PROCEDURE — 2700000000 HC OXYGEN THERAPY PER DAY

## 2024-06-10 PROCEDURE — 2580000003 HC RX 258

## 2024-06-10 PROCEDURE — A4216 STERILE WATER/SALINE, 10 ML: HCPCS

## 2024-06-10 PROCEDURE — 93005 ELECTROCARDIOGRAM TRACING: CPT

## 2024-06-10 PROCEDURE — 2580000003 HC RX 258: Performed by: INTERNAL MEDICINE

## 2024-06-10 PROCEDURE — 85025 COMPLETE CBC W/AUTO DIFF WBC: CPT

## 2024-06-10 PROCEDURE — 84145 PROCALCITONIN (PCT): CPT

## 2024-06-10 PROCEDURE — 6360000002 HC RX W HCPCS

## 2024-06-10 PROCEDURE — 6370000000 HC RX 637 (ALT 250 FOR IP): Performed by: INTERNAL MEDICINE

## 2024-06-10 PROCEDURE — 84439 ASSAY OF FREE THYROXINE: CPT

## 2024-06-10 PROCEDURE — 2060000000 HC ICU INTERMEDIATE R&B

## 2024-06-10 PROCEDURE — 87081 CULTURE SCREEN ONLY: CPT

## 2024-06-10 PROCEDURE — C9113 INJ PANTOPRAZOLE SODIUM, VIA: HCPCS

## 2024-06-10 PROCEDURE — 36556 INSERT NON-TUNNEL CV CATH: CPT

## 2024-06-10 PROCEDURE — 84100 ASSAY OF PHOSPHORUS: CPT

## 2024-06-10 RX ORDER — DOXAZOSIN 2 MG/1
2 TABLET ORAL NIGHTLY
Status: ON HOLD | COMMUNITY
End: 2024-06-12 | Stop reason: HOSPADM

## 2024-06-10 RX ORDER — POTASSIUM CHLORIDE 29.8 MG/ML
20 INJECTION INTRAVENOUS ONCE
Status: COMPLETED | OUTPATIENT
Start: 2024-06-10 | End: 2024-06-10

## 2024-06-10 RX ORDER — ATORVASTATIN CALCIUM 40 MG/1
40 TABLET, FILM COATED ORAL NIGHTLY
Status: DISCONTINUED | OUTPATIENT
Start: 2024-06-10 | End: 2024-06-12 | Stop reason: HOSPADM

## 2024-06-10 RX ORDER — FAMOTIDINE 20 MG/1
20 TABLET, FILM COATED ORAL 2 TIMES DAILY
COMMUNITY

## 2024-06-10 RX ORDER — SODIUM CHLORIDE, SODIUM LACTATE, POTASSIUM CHLORIDE, AND CALCIUM CHLORIDE .6; .31; .03; .02 G/100ML; G/100ML; G/100ML; G/100ML
500 INJECTION, SOLUTION INTRAVENOUS ONCE
Status: COMPLETED | OUTPATIENT
Start: 2024-06-10 | End: 2024-06-10

## 2024-06-10 RX ADMIN — CETIRIZINE HYDROCHLORIDE 5 MG: 10 TABLET, FILM COATED ORAL at 08:32

## 2024-06-10 RX ADMIN — PANTOPRAZOLE SODIUM 40 MG: 40 INJECTION, POWDER, FOR SOLUTION INTRAVENOUS at 08:32

## 2024-06-10 RX ADMIN — PRAMIPEXOLE DIHYDROCHLORIDE 0.5 MG: 0.25 TABLET ORAL at 22:21

## 2024-06-10 RX ADMIN — PIPERACILLIN AND TAZOBACTAM 4500 MG: 4; .5 INJECTION, POWDER, LYOPHILIZED, FOR SOLUTION INTRAVENOUS at 02:25

## 2024-06-10 RX ADMIN — PIPERACILLIN AND TAZOBACTAM 4500 MG: 4; .5 INJECTION, POWDER, LYOPHILIZED, FOR SOLUTION INTRAVENOUS at 08:54

## 2024-06-10 RX ADMIN — SODIUM CHLORIDE: 9 INJECTION, SOLUTION INTRAVENOUS at 02:10

## 2024-06-10 RX ADMIN — MAGNESIUM SULFATE HEPTAHYDRATE 2000 MG: 40 INJECTION, SOLUTION INTRAVENOUS at 02:19

## 2024-06-10 RX ADMIN — SODIUM CHLORIDE, POTASSIUM CHLORIDE, SODIUM LACTATE AND CALCIUM CHLORIDE 500 ML: 600; 310; 30; 20 INJECTION, SOLUTION INTRAVENOUS at 10:09

## 2024-06-10 RX ADMIN — ATORVASTATIN CALCIUM 40 MG: 40 TABLET, FILM COATED ORAL at 22:20

## 2024-06-10 RX ADMIN — ASPIRIN 81 MG: 81 TABLET, COATED ORAL at 08:32

## 2024-06-10 RX ADMIN — AMIODARONE HYDROCHLORIDE 0.5 MG/MIN: 50 INJECTION, SOLUTION INTRAVENOUS at 03:16

## 2024-06-10 RX ADMIN — POTASSIUM CHLORIDE 20 MEQ: 29.8 INJECTION, SOLUTION INTRAVENOUS at 05:56

## 2024-06-10 RX ADMIN — POTASSIUM CHLORIDE 20 MEQ: 29.8 INJECTION, SOLUTION INTRAVENOUS at 10:54

## 2024-06-10 RX ADMIN — PIPERACILLIN AND TAZOBACTAM 4500 MG: 4; .5 INJECTION, POWDER, LYOPHILIZED, FOR SOLUTION INTRAVENOUS at 16:00

## 2024-06-10 RX ADMIN — LEVOTHYROXINE SODIUM 100 MCG: 100 TABLET ORAL at 08:32

## 2024-06-10 RX ADMIN — ENOXAPARIN SODIUM 40 MG: 100 INJECTION SUBCUTANEOUS at 08:32

## 2024-06-10 RX ADMIN — Medication 3 MG: at 22:21

## 2024-06-10 RX ADMIN — SODIUM CHLORIDE, PRESERVATIVE FREE 10 ML: 5 INJECTION INTRAVENOUS at 22:26

## 2024-06-10 RX ADMIN — SODIUM CHLORIDE, PRESERVATIVE FREE 10 ML: 5 INJECTION INTRAVENOUS at 08:33

## 2024-06-10 RX ADMIN — SODIUM CHLORIDE, PRESERVATIVE FREE 10 ML: 5 INJECTION INTRAVENOUS at 02:11

## 2024-06-10 ASSESSMENT — PAIN SCALES - GENERAL
PAINLEVEL_OUTOF10: 0

## 2024-06-10 NOTE — PATIENT CARE CONFERENCE
Intensive Care Daily Quality Rounding Checklist      ICU Team Members: bedside nurse, charge nurse,  , residents, clinical pharmacist    ICU Day #: NUMBER: 1    Intubation Date:      Ventilator Day #:     Central Line Insertion Date: June 9        Day #: NUMBER: 2        Indication: CVCIndication: Administration of vasopressors or vesicant medications     Arterial Line Insertion Date:        Day #:     Temporary Hemodialysis Catheter Insertion Date:        Day #     DVT Prophylaxis: lovenox    GI Prophylaxis: protonix    Bobby Catheter Insertion Date:         Day #:       Indications:       Continued need (if yes, reason documented and discussed with physician):     Skin Issues/ Wounds and ordered treatment discussed on rounds:    Goals/ Plans for the Day: monitor labs, wean levo, needs echo done    Reviewed plan and goals for day with patient and/or representative:

## 2024-06-10 NOTE — CONSULTS
CHIEF COMPLAINT: Wide Complex Tachycardia/NSVT?/NSVT/Septic shock.    HISTORY OF PRESENT ILLNESS: Patient is a 89 y.o. male seen at the request of Gary Cleveland DO and not followed locally by cardiology.     Consulted for abnormal troponin and wide complex tachycardia suggesting VT.    Some SOB. No CP or angina.         Past Medical History:   Diagnosis Date    Lea's esophagus     Carotid atherosclerosis     History of stress test 05/2018    [place] (hard of hearing)     bilateral aids    Hypertension     Raynaud's disease     Thyroid disease        Patient Active Problem List   Diagnosis    Carotid atherosclerosis    Hypertension    Raynaud's disease    Thyroid disease    Pneumonia due to infectious organism    [place] (hard of hearing)    Lea's esophagus    NSTEMI (non-ST elevated myocardial infarction) (HCC)    Ventricular tachycardia (HCC)       No Known Allergies    Current Facility-Administered Medications   Medication Dose Route Frequency Provider Last Rate Last Admin    perflutren lipid microspheres (DEFINITY) injection 1.5 mL  1.5 mL IntraVENous ONCE PRN Judson Gerber APRN - CNP        amiodarone (CORDARONE) 450 mg in dextrose 5 % 250 mL infusion  0.5 mg/min IntraVENous Continuous Judson Gerber APRN - CNP   Stopped at 06/10/24 0433    pantoprazole (PROTONIX) 40 mg in sodium chloride (PF) 0.9 % 10 mL injection  40 mg IntraVENous Daily Judson Gerber APRN - CNP   40 mg at 06/10/24 0832    atorvastatin (LIPITOR) tablet 40 mg  40 mg Oral Nightly Michael Whatley DO        potassium chloride 20 mEq/50 mL IVPB (Central Line)  20 mEq IntraVENous Once Jaclyn Mckeon MD        sodium chloride flush 0.9 % injection 10 mL  10 mL IntraVENous 2 times per day Yanna Peres APRN - CNP   10 mL at 06/10/24 0833    sodium chloride flush 0.9 % injection 10 mL  10 mL IntraVENous PRN Sugar Yanna A, APRN - CNP        0.9 % sodium chloride infusion   IntraVENous PRN Yanna Peres A APRN - CNP        potassium 
Prachi  Electronically signed by CARLOS A Lewis CNP on 6/9/2024 at 10:47 PM    Total critical care time caring for this patient with life threatening, unstable organ failure, including direct patient contact, management of life support systems, review of data including imaging and labs, discussions with other team members and physicians at least 45  Min so far today, excluding procedures. Please feel free to call with questions or concerns.     NOTE: This report was transcribed using voice recognition software. Every effort was made to ensure accuracy; however, inadvertent computerized transcription errors may be present.

## 2024-06-10 NOTE — CARE COORDINATION
Pt admitted w/AMS and fever on antibiotic as outpt for an UTI. On IVFs and zosyn w/Blood and urine cultures pending. Cardiology following for NSTEMI. CM met w/pt w/role of CM explained. Pt resides alone in a condo, no steps to enter and is independent w/the use of a walker. Pt is established w/Dr. Cleveland and uses [facility] on [address] for his medications. No home O2, cpap or nebulizer. Hx of HHC w/[facility]. Hx of SNF stay at [facility]. Pt has a person who comes in to assist four days a wk for three hours each visit. Peg supplies and TF obtained from [facility], [phone]. CM called pt.'s dtr to confirm the above information. Dtnelson Shah feels rehab would be appropriate upon discharge and prefers [facility]. If pt does not qualify for SNF she prefers [facility] w/referral made. Pt is on 2L O2, goal is to wean. If unable to wean O2 dtnelson Shah specifies no preference of DME provider w/referral to O[facility] samuels/[facility]. CM will follow pending further treatment plans. PT/OT to cesar.   TYLOR GhoshN, RN  [facility] Case Management  [phone]

## 2024-06-10 NOTE — ED NOTES
Pt has G tube and is NPO. He only receives medication through g tube. The g tube connector fits onto pts tube and allows attachment with 50mL piston syringe. His family has also brought a box of the feedings the pt receives every 3 hours by gravity.

## 2024-06-10 NOTE — CARE COORDINATION
Internal Medicine On-call Care Coordination Note    I was called by the ED physician because they recommended admission for this patient and I cover their PCP.  The history as I understand it after discussion with the ED physician is as follows:    The patient presented with fever and tachycardia. Diagnosed with UTI on Friday, placed on antibiotics  In the ED they found pneumonia per CXR. Antibiotics started. 87% on RA- NC applied  Cardiology on consult    I placed admission orders.  Including:    General admission orders   Home medications ordered    Dr. Whatley or his coverage will see the patient tomorrow for H&P.    Electronically signed by CARLOS A Dahl CNP on 6/9/2024 at 8:09 PM

## 2024-06-10 NOTE — ACP (ADVANCE CARE PLANNING)
Advance Care Planning   Healthcare Decision Maker:    Primary Decision Maker: Alyssa Gregg - Fani - [phone]    Click here to complete Healthcare Decision Makers including selection of the Healthcare Decision Maker Relationship (ie \"Primary\").

## 2024-06-10 NOTE — H&P
Internal Medicine History & Physical     Name: Sandip Abdi  : 1931  Chief Complaint: Fever (Seen here Fri dx uti put on antibiotics) and Tachycardia  Primary Care Physician: Gary Cleveland DO  Admission date: 2024  Date of service: 6/10/2024   Unit: [address] ICU     History of Present Illness  Sandip is a 89 y.o. year old male. He presented to the hospital with a chief complaint of fever and altered mental status.  He was recently in the emergency department and treated for UTI and sent home.he denies any other specific complaints.  He denies shortness of breath, lightheadedness, dizziness.  He denies burning with urination.  Nothing in particular makes his symptoms better or worse.  Overall symptoms seem to be moderate severity.    No family or friends present at bedside.  History is provided by the patient.  He is felt to be a good/fair historian.    ED course:   Initial blood work and imaging studies performed. Admission recommended by ED physician after he was found to have nonsustained ventricular tachycardia. My coverage discussed the case with the ED provider. Meds in the ED consisted of the following: Tylenol, amiodarone bolus/infusion, Rocephin, doxycycline, Levophed, IV    Past Medical History:   Diagnosis Date    Lea's esophagus     Carotid atherosclerosis     History of stress test 2018    [place] (hard of hearing)     bilateral aids    Hypertension     Raynaud's disease     Thyroid disease        Past Surgical History:   Procedure Laterality Date    COLONOSCOPY      ENDOSCOPY, COLON, DIAGNOSTIC      EYE SURGERY Bilateral     cataract removal    FINGER AMPUTATION      partial right finger    HEMORRHOID SURGERY      HERNIA REPAIR  2018    [facility]    TONSILLECTOMY      UPPER GASTROINTESTINAL ENDOSCOPY N/A 2019    EGD BIOPSY performed by Salvador Amaya MD at [facility] ENDOSCOPY       Family Medical History:  Family History   Problem Relation Age of Onset    Stroke Mother     Other

## 2024-06-10 NOTE — PROCEDURES
PROCEDURE NOTE  Date: 6/9/2024   Name: Sandip Abdi  YOB: 1931    Procedures      Central Line Placement Procedure Note    Indication: vascular access and centrally administered medications    Consent: The patient was and family members were counseled regarding the procedure, its indications, risks, potential complications and alternatives, and any questions were answered. Consent was obtained to proceed.    Procedure: The patient was positioned appropriately and the skin over the right femoral vein was prepped with betadine and draped in a sterile fashion. Local anesthesia was obtained by infiltration using 1% Lidocaine without epinephrine.  A large bore needle was used to identify the vein.  A guide wire was then inserted into the vein through the needle. A triple lumen catheter was then inserted into the vessel over the guide wire using the Seldinger technique.  All ports showed good, free flowing blood return and were flushed with saline solution.   The catheter was then securely fastened to the skin with suture at 18 cm.  Two sutures were placed into the kit included tube clamp, proximal eyelets, and a suture end from each of the securing sutures was extended around the catheter and tied to the proximal eyelets as an added measure to prevent dislodgement. An antibiotic disk was placed and the site was then covered with a sterile dressing.  A post procedure X-ray was not indicated.    The patient tolerated the procedure well.    Complications: none

## 2024-06-11 ENCOUNTER — APPOINTMENT (OUTPATIENT)
Age: 89
DRG: 871 | End: 2024-06-11
Payer: MEDICARE

## 2024-06-11 ENCOUNTER — APPOINTMENT (OUTPATIENT)
Dept: GENERAL RADIOLOGY | Age: 89
DRG: 871 | End: 2024-06-11
Payer: MEDICARE

## 2024-06-11 LAB
ANION GAP SERPL CALCULATED.3IONS-SCNC: 8 MMOL/L (ref 7–16)
B PARAP IS1001 DNA NPH QL NAA+NON-PROBE: NOT DETECTED
B PERT DNA SPEC QL NAA+PROBE: NOT DETECTED
BASOPHILS # BLD: 0.02 K/UL (ref 0–0.2)
BASOPHILS NFR BLD: 1 % (ref 0–2)
BUN SERPL-MCNC: 16 MG/DL (ref 6–23)
C PNEUM DNA NPH QL NAA+NON-PROBE: NOT DETECTED
CALCIUM SERPL-MCNC: 7.7 MG/DL (ref 8.6–10.2)
CHLORIDE SERPL-SCNC: 106 MMOL/L (ref 98–107)
CO2 SERPL-SCNC: 21 MMOL/L (ref 22–29)
CORTIS SERPL-MCNC: 14.4 UG/DL (ref 2.7–18.4)
CORTISOL COLLECTION INFO: NORMAL
CREAT SERPL-MCNC: 0.6 MG/DL (ref 0.7–1.2)
ECHO AO ASC DIAM: 3 CM
ECHO AO ASCENDING AORTA INDEX: 1.64 CM/M2
ECHO AV AREA PEAK VELOCITY: 1.5 CM2
ECHO AV AREA VTI: 1.6 CM2
ECHO AV AREA/BSA PEAK VELOCITY: 0.8 CM2/M2
ECHO AV AREA/BSA VTI: 0.9 CM2/M2
ECHO AV CUSP MM: 1.5 CM
ECHO AV MEAN GRADIENT: 11 MMHG
ECHO AV MEAN VELOCITY: 1.6 M/S
ECHO AV PEAK GRADIENT: 22 MMHG
ECHO AV PEAK VELOCITY: 2.4 M/S
ECHO AV VELOCITY RATIO: 0.42
ECHO AV VTI: 63 CM
ECHO BSA: 1.75 M2
ECHO EST RA PRESSURE: 3 MMHG
ECHO LA DIAMETER INDEX: 2.19 CM/M2
ECHO LA DIAMETER: 4 CM
ECHO LA VOL A-L A2C: 77 ML (ref 18–58)
ECHO LA VOL A-L A4C: 43 ML (ref 18–58)
ECHO LA VOL MOD A2C: 67 ML (ref 18–58)
ECHO LA VOL MOD A4C: 40 ML (ref 18–58)
ECHO LA VOLUME AREA LENGTH: 61 ML
ECHO LA VOLUME INDEX A-L A2C: 42 ML/M2 (ref 16–34)
ECHO LA VOLUME INDEX A-L A4C: 23 ML/M2 (ref 16–34)
ECHO LA VOLUME INDEX AREA LENGTH: 33 ML/M2 (ref 16–34)
ECHO LA VOLUME INDEX MOD A2C: 37 ML/M2 (ref 16–34)
ECHO LA VOLUME INDEX MOD A4C: 22 ML/M2 (ref 16–34)
ECHO LV E' LATERAL VELOCITY: 10 CM/S
ECHO LV E' SEPTAL VELOCITY: 6 CM/S
ECHO LV EDV A2C: 90 ML
ECHO LV EDV A4C: 73 ML
ECHO LV EDV BP: 85 ML (ref 67–155)
ECHO LV EDV INDEX A4C: 40 ML/M2
ECHO LV EDV INDEX BP: 46 ML/M2
ECHO LV EDV NDEX A2C: 49 ML/M2
ECHO LV EJECTION FRACTION A2C: 66 %
ECHO LV EJECTION FRACTION A4C: 53 %
ECHO LV EJECTION FRACTION BIPLANE: 61 % (ref 55–100)
ECHO LV ESV A2C: 31 ML
ECHO LV ESV A4C: 35 ML
ECHO LV ESV BP: 33 ML (ref 22–58)
ECHO LV ESV INDEX A2C: 17 ML/M2
ECHO LV ESV INDEX A4C: 19 ML/M2
ECHO LV ESV INDEX BP: 18 ML/M2
ECHO LV FRACTIONAL SHORTENING: 39 % (ref 28–44)
ECHO LV INTERNAL DIMENSION DIASTOLE INDEX: 2.95 CM/M2
ECHO LV INTERNAL DIMENSION DIASTOLIC: 5.4 CM (ref 4.2–5.9)
ECHO LV INTERNAL DIMENSION SYSTOLIC INDEX: 1.8 CM/M2
ECHO LV INTERNAL DIMENSION SYSTOLIC: 3.3 CM
ECHO LV ISOVOLUMETRIC RELAXATION TIME (IVRT): 120 MS
ECHO LV IVSD: 1.4 CM (ref 0.6–1)
ECHO LV IVSS: 1.4 CM
ECHO LV MASS 2D: 311.7 G (ref 88–224)
ECHO LV MASS INDEX 2D: 170.4 G/M2 (ref 49–115)
ECHO LV POSTERIOR WALL DIASTOLIC: 1.3 CM (ref 0.6–1)
ECHO LV POSTERIOR WALL SYSTOLIC: 1.3 CM
ECHO LV RELATIVE WALL THICKNESS RATIO: 0.48
ECHO LVOT AREA: 3.8 CM2
ECHO LVOT AV VTI INDEX: 0.43
ECHO LVOT DIAM: 2.2 CM
ECHO LVOT MEAN GRADIENT: 2 MMHG
ECHO LVOT PEAK GRADIENT: 4 MMHG
ECHO LVOT PEAK VELOCITY: 1 M/S
ECHO LVOT STROKE VOLUME INDEX: 55.8 ML/M2
ECHO LVOT SV: 102.2 ML
ECHO LVOT VTI: 26.9 CM
ECHO MV "A" WAVE DURATION: 110.7 MSEC
ECHO MV A VELOCITY: 1.04 M/S
ECHO MV AREA PHT: 2.3 CM2
ECHO MV AREA VTI: 3.3 CM2
ECHO MV E DECELERATION TIME (DT): 212.6 MS
ECHO MV E VELOCITY: 0.9 M/S
ECHO MV E/A RATIO: 0.87
ECHO MV E/E' LATERAL: 9
ECHO MV E/E' RATIO (AVERAGED): 12
ECHO MV E/E' SEPTAL: 15
ECHO MV LVOT VTI INDEX: 1.16
ECHO MV MAX VELOCITY: 1.1 M/S
ECHO MV MEAN GRADIENT: 2 MMHG
ECHO MV MEAN VELOCITY: 0.6 M/S
ECHO MV PEAK GRADIENT: 5 MMHG
ECHO MV PRESSURE HALF TIME (PHT): 94.5 MS
ECHO MV VTI: 31.3 CM
ECHO PV MAX VELOCITY: 1 M/S
ECHO PV MEAN GRADIENT: 2 MMHG
ECHO PV MEAN VELOCITY: 0.7 M/S
ECHO PV PEAK GRADIENT: 4 MMHG
ECHO PV VTI: 25.6 CM
ECHO PVEIN A DURATION: 147.6 MS
ECHO PVEIN A VELOCITY: 0.4 M/S
ECHO PVEIN PEAK D VELOCITY: 0.4 M/S
ECHO PVEIN PEAK S VELOCITY: 1 M/S
ECHO PVEIN S/D RATIO: 2.5
ECHO RIGHT VENTRICULAR SYSTOLIC PRESSURE (RVSP): 34 MMHG
ECHO RV INTERNAL DIMENSION: 3.3 CM
ECHO RV TAPSE: 2.1 CM (ref 1.7–?)
ECHO TV REGURGITANT MAX VELOCITY: 2.78 M/S
ECHO TV REGURGITANT PEAK GRADIENT: 31 MMHG
EKG ATRIAL RATE: 101 BPM
EKG ATRIAL RATE: 107 BPM
EKG ATRIAL RATE: 75 BPM
EKG P AXIS: 31 DEGREES
EKG P AXIS: 48 DEGREES
EKG P AXIS: 53 DEGREES
EKG P-R INTERVAL: 126 MS
EKG P-R INTERVAL: 140 MS
EKG P-R INTERVAL: 178 MS
EKG Q-T INTERVAL: 360 MS
EKG Q-T INTERVAL: 368 MS
EKG Q-T INTERVAL: 414 MS
EKG QRS DURATION: 128 MS
EKG QRS DURATION: 142 MS
EKG QRS DURATION: 146 MS
EKG QTC CALCULATION (BAZETT): 462 MS
EKG QTC CALCULATION (BAZETT): 466 MS
EKG QTC CALCULATION (BAZETT): 491 MS
EKG R AXIS: -35 DEGREES
EKG R AXIS: -52 DEGREES
EKG R AXIS: 0 DEGREES
EKG T AXIS: 122 DEGREES
EKG T AXIS: 124 DEGREES
EKG T AXIS: 97 DEGREES
EKG VENTRICULAR RATE: 101 BPM
EKG VENTRICULAR RATE: 107 BPM
EKG VENTRICULAR RATE: 75 BPM
EOSINOPHIL # BLD: 0.13 K/UL (ref 0.05–0.5)
EOSINOPHILS RELATIVE PERCENT: 3 % (ref 0–6)
ERYTHROCYTE [DISTWIDTH] IN BLOOD BY AUTOMATED COUNT: 14.3 % (ref 11.5–15)
FLUAV RNA NPH QL NAA+NON-PROBE: NOT DETECTED
FLUBV RNA NPH QL NAA+NON-PROBE: NOT DETECTED
GFR, ESTIMATED: >90 ML/MIN/1.73M2
GLUCOSE SERPL-MCNC: 81 MG/DL (ref 74–99)
HADV DNA NPH QL NAA+NON-PROBE: NOT DETECTED
HCOV 229E RNA NPH QL NAA+NON-PROBE: NOT DETECTED
HCOV HKU1 RNA NPH QL NAA+NON-PROBE: NOT DETECTED
HCOV NL63 RNA NPH QL NAA+NON-PROBE: NOT DETECTED
HCOV OC43 RNA NPH QL NAA+NON-PROBE: NOT DETECTED
HCT VFR BLD AUTO: 29.7 % (ref 37–54)
HGB BLD-MCNC: 9.6 G/DL (ref 12.5–16.5)
HMPV RNA NPH QL NAA+NON-PROBE: NOT DETECTED
HPIV1 RNA NPH QL NAA+NON-PROBE: NOT DETECTED
HPIV2 RNA NPH QL NAA+NON-PROBE: NOT DETECTED
HPIV3 RNA NPH QL NAA+NON-PROBE: NOT DETECTED
HPIV4 RNA NPH QL NAA+NON-PROBE: NOT DETECTED
IMM GRANULOCYTES # BLD AUTO: <0.03 K/UL (ref 0–0.58)
IMM GRANULOCYTES NFR BLD: 1 % (ref 0–5)
LACTATE BLDV-SCNC: 0.6 MMOL/L (ref 0.5–2.2)
LYMPHOCYTES NFR BLD: 1.17 K/UL (ref 1.5–4)
LYMPHOCYTES RELATIVE PERCENT: 31 % (ref 20–42)
M PNEUMO DNA NPH QL NAA+NON-PROBE: NOT DETECTED
MAGNESIUM SERPL-MCNC: 1.9 MG/DL (ref 1.6–2.6)
MCH RBC QN AUTO: 31.2 PG (ref 26–35)
MCHC RBC AUTO-ENTMCNC: 32.3 G/DL (ref 32–34.5)
MCV RBC AUTO: 96.4 FL (ref 80–99.9)
MICROORGANISM SPEC CULT: NO GROWTH
MICROORGANISM SPEC CULT: NORMAL
MONOCYTES NFR BLD: 0.36 K/UL (ref 0.1–0.95)
MONOCYTES NFR BLD: 9 % (ref 2–12)
NEUTROPHILS NFR BLD: 56 % (ref 43–80)
NEUTS SEG NFR BLD: 2.14 K/UL (ref 1.8–7.3)
PHOSPHATE SERPL-MCNC: 2 MG/DL (ref 2.5–4.5)
PLATELET # BLD AUTO: 93 K/UL (ref 130–450)
PLATELET CONFIRMATION: NORMAL
PMV BLD AUTO: 9.6 FL (ref 7–12)
POTASSIUM SERPL-SCNC: 3.9 MMOL/L (ref 3.5–5)
RBC # BLD AUTO: 3.08 M/UL (ref 3.8–5.8)
RSV RNA NPH QL NAA+NON-PROBE: NOT DETECTED
RV+EV RNA NPH QL NAA+NON-PROBE: NOT DETECTED
SARS-COV-2 RNA NPH QL NAA+NON-PROBE: NOT DETECTED
SERVICE CMNT-IMP: NORMAL
SODIUM SERPL-SCNC: 135 MMOL/L (ref 132–146)
SPECIMEN DESCRIPTION: NORMAL
WBC OTHER # BLD: 3.8 K/UL (ref 4.5–11.5)

## 2024-06-11 PROCEDURE — 85025 COMPLETE CBC W/AUTO DIFF WBC: CPT

## 2024-06-11 PROCEDURE — C9113 INJ PANTOPRAZOLE SODIUM, VIA: HCPCS

## 2024-06-11 PROCEDURE — 2060000000 HC ICU INTERMEDIATE R&B

## 2024-06-11 PROCEDURE — 2580000003 HC RX 258

## 2024-06-11 PROCEDURE — 97165 OT EVAL LOW COMPLEX 30 MIN: CPT

## 2024-06-11 PROCEDURE — 93306 TTE W/DOPPLER COMPLETE: CPT

## 2024-06-11 PROCEDURE — 84100 ASSAY OF PHOSPHORUS: CPT

## 2024-06-11 PROCEDURE — 6360000002 HC RX W HCPCS

## 2024-06-11 PROCEDURE — 2500000003 HC RX 250 WO HCPCS: Performed by: RADIOLOGY

## 2024-06-11 PROCEDURE — 99233 SBSQ HOSP IP/OBS HIGH 50: CPT | Performed by: INTERNAL MEDICINE

## 2024-06-11 PROCEDURE — 92526 ORAL FUNCTION THERAPY: CPT

## 2024-06-11 PROCEDURE — 0202U NFCT DS 22 TRGT SARS-COV-2: CPT

## 2024-06-11 PROCEDURE — 97161 PT EVAL LOW COMPLEX 20 MIN: CPT

## 2024-06-11 PROCEDURE — 92611 MOTION FLUOROSCOPY/SWALLOW: CPT

## 2024-06-11 PROCEDURE — 2580000003 HC RX 258: Performed by: INTERNAL MEDICINE

## 2024-06-11 PROCEDURE — 6360000002 HC RX W HCPCS: Performed by: INTERNAL MEDICINE

## 2024-06-11 PROCEDURE — 2580000003 HC RX 258: Performed by: NURSE PRACTITIONER

## 2024-06-11 PROCEDURE — 74230 X-RAY XM SWLNG FUNCJ C+: CPT

## 2024-06-11 PROCEDURE — 83735 ASSAY OF MAGNESIUM: CPT

## 2024-06-11 PROCEDURE — 6370000000 HC RX 637 (ALT 250 FOR IP): Performed by: NURSE PRACTITIONER

## 2024-06-11 PROCEDURE — 2500000003 HC RX 250 WO HCPCS: Performed by: NURSE PRACTITIONER

## 2024-06-11 PROCEDURE — 6370000000 HC RX 637 (ALT 250 FOR IP): Performed by: INTERNAL MEDICINE

## 2024-06-11 PROCEDURE — 83605 ASSAY OF LACTIC ACID: CPT

## 2024-06-11 PROCEDURE — 6360000002 HC RX W HCPCS: Performed by: NURSE PRACTITIONER

## 2024-06-11 PROCEDURE — 80048 BASIC METABOLIC PNL TOTAL CA: CPT

## 2024-06-11 PROCEDURE — 93306 TTE W/DOPPLER COMPLETE: CPT | Performed by: INTERNAL MEDICINE

## 2024-06-11 RX ORDER — SODIUM CHLORIDE, SODIUM LACTATE, POTASSIUM CHLORIDE, CALCIUM CHLORIDE 600; 310; 30; 20 MG/100ML; MG/100ML; MG/100ML; MG/100ML
INJECTION, SOLUTION INTRAVENOUS CONTINUOUS
Status: DISCONTINUED | OUTPATIENT
Start: 2024-06-11 | End: 2024-06-11

## 2024-06-11 RX ADMIN — SODIUM CHLORIDE, PRESERVATIVE FREE 10 ML: 5 INJECTION INTRAVENOUS at 10:08

## 2024-06-11 RX ADMIN — LEVOTHYROXINE SODIUM 100 MCG: 100 TABLET ORAL at 06:00

## 2024-06-11 RX ADMIN — ATORVASTATIN CALCIUM 40 MG: 40 TABLET, FILM COATED ORAL at 22:28

## 2024-06-11 RX ADMIN — BARIUM SULFATE 10 ML: 400 SUSPENSION ORAL at 14:23

## 2024-06-11 RX ADMIN — SODIUM CHLORIDE: 9 INJECTION, SOLUTION INTRAVENOUS at 10:15

## 2024-06-11 RX ADMIN — SODIUM CHLORIDE: 9 INJECTION, SOLUTION INTRAVENOUS at 00:07

## 2024-06-11 RX ADMIN — PANTOPRAZOLE SODIUM 40 MG: 40 INJECTION, POWDER, FOR SOLUTION INTRAVENOUS at 10:07

## 2024-06-11 RX ADMIN — SODIUM CHLORIDE, PRESERVATIVE FREE 10 ML: 5 INJECTION INTRAVENOUS at 22:28

## 2024-06-11 RX ADMIN — CETIRIZINE HYDROCHLORIDE 5 MG: 10 TABLET, FILM COATED ORAL at 10:07

## 2024-06-11 RX ADMIN — ENOXAPARIN SODIUM 40 MG: 100 INJECTION SUBCUTANEOUS at 10:08

## 2024-06-11 RX ADMIN — PIPERACILLIN AND TAZOBACTAM 4500 MG: 4; .5 INJECTION, POWDER, LYOPHILIZED, FOR SOLUTION INTRAVENOUS at 16:20

## 2024-06-11 RX ADMIN — PIPERACILLIN AND TAZOBACTAM 4500 MG: 4; .5 INJECTION, POWDER, LYOPHILIZED, FOR SOLUTION INTRAVENOUS at 00:13

## 2024-06-11 RX ADMIN — ASPIRIN 81 MG: 81 TABLET, COATED ORAL at 10:07

## 2024-06-11 RX ADMIN — PRAMIPEXOLE DIHYDROCHLORIDE 0.5 MG: 0.25 TABLET ORAL at 22:28

## 2024-06-11 RX ADMIN — Medication 3 MG: at 22:28

## 2024-06-11 RX ADMIN — SODIUM PHOSPHATE, MONOBASIC, MONOHYDRATE AND SODIUM PHOSPHATE, DIBASIC, ANHYDROUS 15 MMOL: 142; 276 INJECTION, SOLUTION INTRAVENOUS at 11:58

## 2024-06-11 RX ADMIN — PETROLATUM: 420 OINTMENT TOPICAL at 16:16

## 2024-06-11 RX ADMIN — PETROLATUM: 420 OINTMENT TOPICAL at 22:28

## 2024-06-11 RX ADMIN — BARIUM SULFATE 120 ML: 400 SUSPENSION ORAL at 14:23

## 2024-06-11 RX ADMIN — PIPERACILLIN AND TAZOBACTAM 4500 MG: 4; .5 INJECTION, POWDER, LYOPHILIZED, FOR SOLUTION INTRAVENOUS at 10:10

## 2024-06-11 NOTE — PLAN OF CARE
Problem: Discharge Planning  Goal: Discharge to home or other facility with appropriate resources  6/11/2024 1142 by Cathy Wild RN  Outcome: Progressing  6/11/2024 0041 by Jarod Schuster RN  Outcome: Progressing     Problem: Pain  Goal: Verbalizes/displays adequate comfort level or baseline comfort level  6/11/2024 1142 by Cathy Wild RN  Outcome: Progressing  6/11/2024 0041 by Jarod Schuster RN  Outcome: Progressing     Problem: Skin/Tissue Integrity  Goal: Absence of new skin breakdown  Description: 1.  Monitor for areas of redness and/or skin breakdown  2.  Assess vascular access sites hourly  3.  Every 4-6 hours minimum:  Change oxygen saturation probe site  4.  Every 4-6 hours:  If on nasal continuous positive airway pressure, respiratory therapy assess nares and determine need for appliance change or resting period.  6/11/2024 1142 by Cathy Wild RN  Outcome: Progressing  6/11/2024 0041 by Jarod Schuster RN  Outcome: Progressing     Problem: Safety - Adult  Goal: Free from fall injury  6/11/2024 1142 by Cathy Wild RN  Outcome: Progressing  6/11/2024 0041 by Jarod Schuster RN  Outcome: Progressing     Problem: ABCDS Injury Assessment  Goal: Absence of physical injury  6/11/2024 1142 by Cathy Wild RN  Outcome: Progressing  6/11/2024 0041 by Jarod Schuster RN  Outcome: Progressing     Problem: Nutrition Deficit:  Goal: Optimize nutritional status  6/11/2024 1142 by Cathy Wild RN  Outcome: Progressing  6/11/2024 0041 by Jarod Schuster RN  Outcome: Progressing

## 2024-06-12 ENCOUNTER — APPOINTMENT (OUTPATIENT)
Dept: GENERAL RADIOLOGY | Age: 89
DRG: 871 | End: 2024-06-12
Payer: MEDICARE

## 2024-06-12 VITALS
DIASTOLIC BLOOD PRESSURE: 65 MMHG | HEIGHT: 71 IN | HEART RATE: 55 BPM | BODY MASS INDEX: 20.03 KG/M2 | SYSTOLIC BLOOD PRESSURE: 135 MMHG | TEMPERATURE: 98.1 F | OXYGEN SATURATION: 95 % | RESPIRATION RATE: 16 BRPM | WEIGHT: 143.1 LBS

## 2024-06-12 LAB
ANION GAP SERPL CALCULATED.3IONS-SCNC: 6 MMOL/L (ref 7–16)
BASOPHILS # BLD: 0.01 K/UL (ref 0–0.2)
BASOPHILS NFR BLD: 0 % (ref 0–2)
BUN SERPL-MCNC: 15 MG/DL (ref 6–23)
CALCIUM SERPL-MCNC: 7.8 MG/DL (ref 8.6–10.2)
CHLORIDE SERPL-SCNC: 106 MMOL/L (ref 98–107)
CO2 SERPL-SCNC: 24 MMOL/L (ref 22–29)
CREAT SERPL-MCNC: 0.6 MG/DL (ref 0.7–1.2)
EOSINOPHIL # BLD: 0.16 K/UL (ref 0.05–0.5)
EOSINOPHILS RELATIVE PERCENT: 4 % (ref 0–6)
ERYTHROCYTE [DISTWIDTH] IN BLOOD BY AUTOMATED COUNT: 14.1 % (ref 11.5–15)
GFR, ESTIMATED: >90 ML/MIN/1.73M2
GLUCOSE SERPL-MCNC: 81 MG/DL (ref 74–99)
HCT VFR BLD AUTO: 31.1 % (ref 37–54)
HGB BLD-MCNC: 10.1 G/DL (ref 12.5–16.5)
IMM GRANULOCYTES # BLD AUTO: <0.03 K/UL (ref 0–0.58)
IMM GRANULOCYTES NFR BLD: 0 % (ref 0–5)
L PNEUMO1 AG UR QL IA.RAPID: NEGATIVE
LACTATE BLDV-SCNC: 0.6 MMOL/L (ref 0.5–2.2)
LYMPHOCYTES NFR BLD: 1.22 K/UL (ref 1.5–4)
LYMPHOCYTES RELATIVE PERCENT: 31 % (ref 20–42)
MAGNESIUM SERPL-MCNC: 1.9 MG/DL (ref 1.6–2.6)
MCH RBC QN AUTO: 30.9 PG (ref 26–35)
MCHC RBC AUTO-ENTMCNC: 32.5 G/DL (ref 32–34.5)
MCV RBC AUTO: 95.1 FL (ref 80–99.9)
MONOCYTES NFR BLD: 0.43 K/UL (ref 0.1–0.95)
MONOCYTES NFR BLD: 11 % (ref 2–12)
NEUTROPHILS NFR BLD: 53 % (ref 43–80)
NEUTS SEG NFR BLD: 2.1 K/UL (ref 1.8–7.3)
PHOSPHATE SERPL-MCNC: 2.3 MG/DL (ref 2.5–4.5)
PLATELET # BLD AUTO: 109 K/UL (ref 130–450)
PMV BLD AUTO: 9.9 FL (ref 7–12)
POTASSIUM SERPL-SCNC: 3.8 MMOL/L (ref 3.5–5)
RBC # BLD AUTO: 3.27 M/UL (ref 3.8–5.8)
S PNEUM AG SPEC QL: NEGATIVE
SODIUM SERPL-SCNC: 136 MMOL/L (ref 132–146)
SPECIMEN SOURCE: NORMAL
WBC OTHER # BLD: 3.9 K/UL (ref 4.5–11.5)

## 2024-06-12 PROCEDURE — 83605 ASSAY OF LACTIC ACID: CPT

## 2024-06-12 PROCEDURE — 84100 ASSAY OF PHOSPHORUS: CPT

## 2024-06-12 PROCEDURE — 2580000003 HC RX 258: Performed by: INTERNAL MEDICINE

## 2024-06-12 PROCEDURE — 2580000003 HC RX 258: Performed by: NURSE PRACTITIONER

## 2024-06-12 PROCEDURE — 80048 BASIC METABOLIC PNL TOTAL CA: CPT

## 2024-06-12 PROCEDURE — 6360000002 HC RX W HCPCS: Performed by: NURSE PRACTITIONER

## 2024-06-12 PROCEDURE — 36415 COLL VENOUS BLD VENIPUNCTURE: CPT

## 2024-06-12 PROCEDURE — 83735 ASSAY OF MAGNESIUM: CPT

## 2024-06-12 PROCEDURE — 6370000000 HC RX 637 (ALT 250 FOR IP): Performed by: NURSE PRACTITIONER

## 2024-06-12 PROCEDURE — 2580000003 HC RX 258

## 2024-06-12 PROCEDURE — 6360000002 HC RX W HCPCS: Performed by: INTERNAL MEDICINE

## 2024-06-12 PROCEDURE — 97535 SELF CARE MNGMENT TRAINING: CPT

## 2024-06-12 PROCEDURE — 97530 THERAPEUTIC ACTIVITIES: CPT

## 2024-06-12 PROCEDURE — 85025 COMPLETE CBC W/AUTO DIFF WBC: CPT

## 2024-06-12 PROCEDURE — 71045 X-RAY EXAM CHEST 1 VIEW: CPT

## 2024-06-12 PROCEDURE — 99233 SBSQ HOSP IP/OBS HIGH 50: CPT | Performed by: INTERNAL MEDICINE

## 2024-06-12 PROCEDURE — C9113 INJ PANTOPRAZOLE SODIUM, VIA: HCPCS

## 2024-06-12 PROCEDURE — 6360000002 HC RX W HCPCS

## 2024-06-12 RX ORDER — ATORVASTATIN CALCIUM 40 MG/1
40 TABLET, FILM COATED ORAL NIGHTLY
Qty: 30 TABLET | Refills: 0 | Status: SHIPPED | OUTPATIENT
Start: 2024-06-12

## 2024-06-12 RX ORDER — LEVOTHYROXINE SODIUM 0.1 MG/1
100 TABLET ORAL DAILY
Qty: 30 TABLET | Refills: 0 | Status: SHIPPED | OUTPATIENT
Start: 2024-06-13

## 2024-06-12 RX ORDER — AMOXICILLIN AND CLAVULANATE POTASSIUM 875; 125 MG/1; MG/1
1 TABLET, FILM COATED ORAL 2 TIMES DAILY
Qty: 14 TABLET | Refills: 0 | Status: SHIPPED | OUTPATIENT
Start: 2024-06-12 | End: 2024-06-19

## 2024-06-12 RX ORDER — DOXAZOSIN 2 MG/1
2 TABLET ORAL NIGHTLY
Qty: 30 TABLET | Refills: 3 | Status: SHIPPED | OUTPATIENT
Start: 2024-06-12

## 2024-06-12 RX ADMIN — PIPERACILLIN AND TAZOBACTAM 4500 MG: 4; .5 INJECTION, POWDER, LYOPHILIZED, FOR SOLUTION INTRAVENOUS at 08:18

## 2024-06-12 RX ADMIN — PIPERACILLIN AND TAZOBACTAM 4500 MG: 4; .5 INJECTION, POWDER, LYOPHILIZED, FOR SOLUTION INTRAVENOUS at 00:25

## 2024-06-12 RX ADMIN — PANTOPRAZOLE SODIUM 40 MG: 40 INJECTION, POWDER, FOR SOLUTION INTRAVENOUS at 10:16

## 2024-06-12 RX ADMIN — SODIUM CHLORIDE, PRESERVATIVE FREE 10 ML: 5 INJECTION INTRAVENOUS at 10:18

## 2024-06-12 RX ADMIN — PETROLATUM: 420 OINTMENT TOPICAL at 10:16

## 2024-06-12 RX ADMIN — ASPIRIN 81 MG: 81 TABLET, COATED ORAL at 10:14

## 2024-06-12 RX ADMIN — ENOXAPARIN SODIUM 40 MG: 100 INJECTION SUBCUTANEOUS at 10:14

## 2024-06-12 RX ADMIN — LEVOTHYROXINE SODIUM 100 MCG: 100 TABLET ORAL at 06:07

## 2024-06-12 RX ADMIN — CETIRIZINE HYDROCHLORIDE 5 MG: 10 TABLET, FILM COATED ORAL at 10:14

## 2024-06-12 NOTE — DISCHARGE SUMMARY
resolved  Transition antibiotics to oral on discharge     Electronically signed by Michael Whatley DO on 6/12/2024 at 12:08 PM

## 2024-06-12 NOTE — CARE COORDINATION
Authorization received for pt to discharge to [facility]. Facility to transport at 4 pm w/nursing, pt and dtr updated. Envelope, transport form and 7000 completed by CJ w/VINCENT initiated.  TYLOR GhoshN, RN  [facility] Case Management  [phone]

## 2024-06-12 NOTE — PROGRESS NOTES
CHIEF COMPLAINT: Wide Complex Tachycardia/NSVT?/NSVT/Septic shock.    HISTORY OF PRESENT ILLNESS: Patient is a 89 y.o. male seen at the request of Gary Cleveland DO and not followed locally by cardiology.     Consulted for abnormal troponin and wide complex tachycardia suggesting VT.    Some SOB. No CP or angina.         Past Medical History:   Diagnosis Date    Lea's esophagus     Carotid atherosclerosis     History of stress test 05/2018    [place] (hard of hearing)     bilateral aids    Hypertension     Raynaud's disease     Thyroid disease        Patient Active Problem List   Diagnosis    Carotid atherosclerosis    Hypertension    Raynaud's disease    Thyroid disease    Pneumonia due to infectious organism    [place] (hard of hearing)    Lea's esophagus    NSTEMI (non-ST elevated myocardial infarction) (HCC)    Ventricular tachycardia (HCC)    Sepsis (HCC)    Severe protein-calorie malnutrition (HCC)       No Known Allergies    Current Facility-Administered Medications   Medication Dose Route Frequency Provider Last Rate Last Admin    white petrolatum ointment   Topical BID Michael Whatley DO   Given at 06/11/24 2228    magnesium hydroxide (MILK OF MAGNESIA) 400 MG/5ML suspension 15 mL  15 mL Oral Daily PRN Michael Whatley DO        perflutren lipid microspheres (DEFINITY) injection 1.5 mL  1.5 mL IntraVENous ONCE PRN Judson Gerber APRN - CNP        pantoprazole (PROTONIX) 40 mg in sodium chloride (PF) 0.9 % 10 mL injection  40 mg IntraVENous Daily Judson Gerber APRN - CNP   40 mg at 06/11/24 1007    atorvastatin (LIPITOR) tablet 40 mg  40 mg Oral Nightly Michael Whatley DO   40 mg at 06/11/24 2228    sodium chloride flush 0.9 % injection 10 mL  10 mL IntraVENous 2 times per day Yanna Peres APRN - CNP   10 mL at 06/11/24 2228    sodium chloride flush 0.9 % injection 10 mL  10 mL IntraVENous PRN SugarYanna APRN - CNP        0.9 % sodium chloride infusion   IntraVENous PRN Yanna Peres APRN 
  [facility] Quality Flow/Interdisciplinary Rounds Progress Note        Quality Flow Rounds held on June 12, 2024    Disciplines Attending:  Bedside Nurse, , , and Nursing Unit Leadership    Sandip Abdi was admitted on 6/9/2024  5:01 PM    Anticipated Discharge Date:  Expected Discharge Date: 06/12/24    Disposition:    Christopher Score:  Christopher Scale Score: 20    Readmission Risk              Risk of Unplanned Readmission:  16           Discussed patient goal for the day, patient clinical progression, and barriers to discharge.  The following Goal(s) of the Day/Commitment(s) have been identified:   IV ABX / Protonix, Tube feeding (NPO), cardio plan, pulm plan, discharge planning      Nicanor Russell RN  June 12, 2024        
  P Quality Flow/Interdisciplinary Rounds Progress Note        Quality Flow Rounds held on June 11, 2024    Disciplines Attending:  Bedside Nurse, , , and Nursing Unit Leadership    Sandip Abdi was admitted on 6/9/2024  5:01 PM    Anticipated Discharge Date:  Expected Discharge Date: 06/12/24    Disposition: home    Christopher Score:  Christopher Scale Score: 18    Readmission Risk              Risk of Unplanned Readmission:  17           Discussed patient goal for the day, patient clinical progression, and barriers to discharge.  The following Goal(s) of the Day/Commitment(s) have been identified:   PT OT pulm plan, echo, MBS      Lyndsey Shin RN  June 11, 2024        
  Physician Progress Note      PATIENT:               SANAZ BUSH  CSN #:                  [id number]  :                       1931  ADMIT DATE:       2024 5:01 PM  DISCH DATE:  RESPONDING  PROVIDER #:        MAURO RUTH APRN - CNP          QUERY TEXT:    Patient admitted with weakness. Noted documentation of acute respiratory   failure in IM  note.  In order to support the diagnosis of acute   respiratory failure, please include additional clinical indicators in your   documentation.  Or please document if the diagnosis of acute respiratory   failure has been ruled out after further study.    The medical record reflects the following:  Risk Factors: advanced age  Clinical Indicators: respirations 17-27, 99% RA -> 95% 1L, per  IM   \"...Acute Hypoxic Respiratory Failure; resolved. Related to the pneumonia.   Currently back on room air with stable oxygenation...\"  Treatment: O2 therapy    Acute Respiratory Failure Clinical Indicators per  MS-DRG Training Guide and   Quick Reference Guide:  pO2 < 60 mmHg  pCO2 > 50 and pH < 7.35  P/F ratio (pO2 / FIO2) < 300  pO2 decrease or pCO2 increase by 10 mmHg from baseline (if known)  Supplemental oxygen of 40% or more  Presence of respiratory distress, wheezing  Unable to speak in complete sentences  Use of accessory muscles to breathe  Extreme anxiety and feeling of impending doom  Tripod position  Confusion/altered mental status/obtunded    Thank you,  Maida Rodriguez RN, BSN, CDIS  Clinical Documentation Integrity  [email or web address]  Options provided:  -- Acute Respiratory Failure as evidenced by, Please document evidence.  -- Acute Respiratory Failure ruled out after study  -- Other - I will add my own diagnosis  -- Disagree - Not applicable / Not valid  -- Disagree - Clinically unable to determine / Unknown  -- Refer to Clinical Documentation Reviewer    PROVIDER RESPONSE TEXT:    Acute Respiratory Failure has been ruled out after 
4 Eyes Skin Assessment     NAME:  Sandip Abdi  YOB: 1931  MEDICAL RECORD NUMBER:  [id number]    The patient is being assessed for  Transfer to New Unit    I agree that at least one RN has performed a thorough Head to Toe Skin Assessment on the patient. ALL assessment sites listed below have been assessed.      Areas assessed by both nurses:    Head, Face, Ears, Shoulders, Back, Chest, Arms, Elbows, Hands, Sacrum. Buttock, Coccyx, Ischium, Legs. Feet and Heels, and Under Medical Devices         Does the Patient have a Wound? No noted wound(s)       Christopher Prevention initiated by RN: Yes  Wound Care Orders initiated by RN: No    Pressure Injury (Stage 3,4, Unstageable, DTI, NWPT, and Complex wounds) if present, place Wound referral order by RN under : No    New Ostomies, if present place, Ostomy referral order under : No     Nurse 1 eSignature: Electronically signed by Jarod Schuster RN on 6/10/24 at 11:10 PM EDT    **SHARE this note so that the co-signing nurse can place an eSignature**    Nurse 2 eSignature: Electronically signed by Edna Borges RN on 6/10/24 at 11:47 PM EDT  
Aqauphor ordered for reddened buttocks   
Comprehensive Nutrition Assessment    Type and Reason for Visit:  Initial, Positive Nutrition Screen (Tube Feeding)    Nutrition Recommendations/Plan:     TF recommendation when needed (based on home TF regimen):    Standard with Fiber (Jevity 1.5) 250 ml bolus 5X/day with 60 ml free water flush before & after each bolus  To provide: 1250 ml tv, 1875 kcal, 80 g pro, 1550 ml total free water  Regimen meets 100% calorie & protein needs    Will continue inpatient monitoring     Malnutrition Assessment:  Malnutrition Status:  Severe malnutrition (06/10/24 1145)    Context:  Chronic Illness     Findings of the 6 clinical characteristics of malnutrition:  Energy Intake:  Mild decrease in energy intake (Comment) (NPO/no TF order at this time)  Weight Loss:  No significant weight loss     Body Fat Loss:   (moderate) Buccal region, Triceps   Muscle Mass Loss:  Severe muscle mass loss Clavicles (pectoralis & deltoids), Scapula (trapezius)  Fluid Accumulation:  No significant fluid accumulation     Strength:  Not Performed    Nutrition Assessment:    Pt admits w/ NSTEMI, ?PNA w/ sepsis. Hx dysphagia s/p PEG. Discussed home TF w/ pt's daughter, will provide TF recommendation & monitor.    Nutrition Related Findings:    A&O, [place], I&Os WDL, no edema, abd soft/flat, +BS, PEG clamped, Na 131, +IVF   Wound Type: None       Current Nutrition Intake & Therapies:    Average Meal Intake: NPO  Average Supplements Intake: NPO  Diet NPO    Anthropometric Measures:  Height: 180.3 cm (5' 11\")  Ideal Body Weight (IBW): 172 lbs (78 kg)    Admission Body Weight: 60.9 kg (134 lb 4.2 oz) (6/10 bed)  Current Body Weight: 60.9 kg (134 lb 4.2 oz) (6/10), 78.1 % IBW. Weight Source: Bed Scale  Current BMI (kg/m2): 18.7  Usual Body Weight: 58.2 kg (128 lb 6.4 oz) (4/1/24 actual per EMR- [facility])  % Weight Change (Calculated): 4.6                    BMI Categories: Underweight (BMI less than 22) age over 65    Estimated Daily Nutrient Needs:  Energy 
Internal Medicine Progress Note    Patient's name: Sandip Abdi  : 1931  Chief complaints (on day of admission): Fever (Seen here Fri dx uti put on antibiotics) and Tachycardia  Admission date: 2024  Date of service: 2024   Room: [address] INTERMEDIATE  Primary care physician: Gary Cleveland DO  Reason for visit: Follow-up for NSTEMI, pneumonia    Subjective  Sandip is seen lying in bed awake and alert in no distress. He is currently getting his echo. He reports that he feels a little better today. He denies any shortness of breath or difficulty breathing at this time. Denies any nausea or vomiting. In discussion with nursing he was noted to have some leaking around his PEG site overnight but apparently this is not new per family. No other issues or concerns from nursing.    Review of Systems  Full 10 point review of systems negative unless mentioned above.    Hospital Medications  Current Facility-Administered Medications   Medication Dose Route Frequency Provider Last Rate Last Admin    perflutren lipid microspheres (DEFINITY) injection 1.5 mL  1.5 mL IntraVENous ONCE PRN Judson Gerber APRN - CNP        pantoprazole (PROTONIX) 40 mg in sodium chloride (PF) 0.9 % 10 mL injection  40 mg IntraVENous Daily Judson Gerber APRN - CNP   40 mg at 06/10/24 0832    atorvastatin (LIPITOR) tablet 40 mg  40 mg Oral Nightly Michael Whatley DO   40 mg at 06/10/24 2220    sodium chloride flush 0.9 % injection 10 mL  10 mL IntraVENous 2 times per day Sugar, Yanna A, APRN - CNP   10 mL at 06/10/24 2226    sodium chloride flush 0.9 % injection 10 mL  10 mL IntraVENous PRN Sugar, Yanna A, APRN - CNP        0.9 % sodium chloride infusion   IntraVENous PRN Sugar, Yanna A, APRN - CNP        potassium chloride (KLOR-CON M) extended release tablet 40 mEq  40 mEq Oral PRN Sugar, Yanna A, APRN - CNP        Or    potassium bicarb-citric acid (EFFER-K) effervescent tablet 40 mEq  40 mEq Oral PRN Sugar, Yanna A, APRN - CNP     
Nurse to nurse report called to maria eugenia ...spoke to berta olguin nurse. Olivia and mar report faxed.   
Occupational Therapy  OT BEDSIDE TREATMENT NOTE      Date:2024  Patient Name: Sandip Abdi  MRN: [id number]  : 1931  Room: [address] [place], [place] [id number]         Evaluating OT: Abbey Horta OTR/L   [id number]       Referring Provider:Michael Whatley DO     Specific Provider Orders/Date:OT eval and treat 6/10/2024       Diagnosis:  Wide-complex tachycardia [R00.0]  Pneumonia of left lung due to infectious organism, unspecified part of lung [J18.9]  Pneumonia due to infectious organism [J18.9]  Sepsis, due to unspecified organism, unspecified whether acute organ dysfunction present (HCC) [A41.9]     Pertinent Medical History: HTN, Raynaud's disease,      Precautions:  Fall Risk, prefers to be called \"Laureano\", G tube       Assessment of current deficits    [x] Functional mobility            [x]ADLs           [x] Strength                  []Cognition    [x] Functional transfers          [x] IADLs         [x] Safety Awareness   [x]Endurance    [] Fine Coordination                         [x] Balance      [] Vision/perception   []Sensation      []Gross Motor Coordination             [] ROM           [] Delirium                   [] Motor Control      OT PLAN OF CARE   OT POC based on physician orders, patient diagnosis and results of clinical assessment     Frequency/Duration  2-4 days/wk for 2 - 4weeks PRN   Specific OT Treatment Interventions to include:   ADL retraining/adapted techniques and AE recommendations to increase functional independence within precautions                    Energy conservation techniques to improve tolerance for selfcare routine   Functional transfer/mobility training/DME recommendations for increased independence, safety and fall prevention         Patient/family education to increase safety and functional independence             Environmental modifications for safe mobility and completion of ADLs                             Therapeutic activity to improve functional performance during ADLs.           
Patient admitted from 207 to ER room 22, with the following belongings; eye glasses and gold in color wedding ring, placed on monitor, patient oriented to room and unit visiting hours.  Patient guide at bedside, reviewed patient rights and responsibilities. MRSA nasal swab obtained.  Bed alarm on.  Call light within reach. Family took home patients hearing aids and tube feed that was brought from home.   
Patient okay to discharge from cardio standpoint per Kenya Ramsay NP  
Patients G-tube was found to have a moderate amount of bile colored drainage around the site. Patient's son Dr. Abdi at bedside and states that patient has had some leaking around the site, usually daily, since it was inserted in March.     Cleansed skin around site and applied new dressing. Dry and intact at this time.   
Physical Therapy  Facility/Department: [address] INTERMEDIATE  Physical Therapy Initial Assessment    Name: Sandip Abdi  : 1931  MRN: [id number]  Date of Service: 2024        Patient Diagnosis(es): The primary encounter diagnosis was Sepsis, due to unspecified organism, unspecified whether acute organ dysfunction present (HCC). Diagnoses of Pneumonia of left lung due to infectious organism, unspecified part of lung and Wide-complex tachycardia were also pertinent to this visit.  Past Medical History:  has a past medical history of Lea's esophagus, Carotid atherosclerosis, History of stress test, [place] (hard of hearing), Hypertension, Raynaud's disease, and Thyroid disease.  Past Surgical History:  has a past surgical history that includes Hemorrhoid surgery; eye surgery (Bilateral); Endoscopy, colon, diagnostic; Colonoscopy; Tonsillectomy; Finger amputation; hernia repair (2018); and Upper gastrointestinal endoscopy (N/A, 2019).      Evaluating Therapist: Sophy Bethea PT    Room #:  0642/0642-A  Diagnosis:  Wide-complex tachycardia [R00.0]  Pneumonia of left lung due to infectious organism, unspecified part of lung [J18.9]  Pneumonia due to infectious organism [J18.9]  Sepsis, due to unspecified organism, unspecified whether acute organ dysfunction present (HCC) [A41.9]  PMHx/PSHx:  Lea's esophagus, PEG, Raynaud's  Precautions:  falls, alarm, NPO      Social:  Pt lives alone in a 1 floor plan 0 steps  to enter.  Prior to admission Has home health aide 4 days a week. Family is also supportive. Ambulates with ww or upright walker     Initial Evaluation  Date: 24 Treatment      Short Term/ Long Term   Goals   Was pt agreeable to Eval/treatment? yes     Does pt have pain? No c/o pain     Bed Mobility  Rolling: SBA  Supine to sit: SBA  Sit to supine: NT  Scooting: SBA  independent   Transfers Sit to stand: SBA  Stand to sit: SBA  Stand pivot: SBA  independent   Ambulation    150 feet with 
SPEECH/LANGUAGE PATHOLOGY  VIDEOFLUOROSCOPIC STUDY OF SWALLOWING (MBS)   and PLAN OF CARE    PATIENT NAME:  Sandip Abdi  (male)     MRN:  [id number]    :  1931  (89 y.o.)  STATUS:  Inpatient: Room 42/42-A    TODAY'S DATE:  2024  REFERRING PROVIDER:   Dr. Whatley   SPECIFIC PROVIDER ORDER: SLP video swallow  Date of order:  24   REASON FOR REFERRAL: Assess oropharyngeal swallow function    EVALUATING THERAPIST: Crystal Barragan, SLP      RESULTS:      DYSPHAGIA DIAGNOSIS:  moderate pharyngeal phase dysphagia     Per chart review, patient with significant hx of dysphagia, and mostly utilizes PEG tube for nutrition. During MBSS, significant pooling of secretions as well as barium residuals in the hypopharynx were noted. However, difficult to determine if pyriform residue spills over to vocal folds or remains in pyriform despite various trials and visualizations. Patient with consistent wet cough during trials, but no blatant aspiration was visualized. SLP recommend continued discussion with physician and family to determine safety of continued oral intake despite potential aspiration risk.      DIET RECOMMENDATIONS: Defer recommendation to physician pending further discussion with patient and family     FEEDING RECOMMENDATIONS:    Assistance level:  Not applicable     Compensatory strategies recommended: Thorough oral care to prevent colonization of oral bacteria   Upright in bed/ chair as tolerated  Fully alert for all PO     Discussed recommendations with:  patient nurse in person    Laryngeal Penetration and Aspiration:  Penetration WITHOUT aspiration was observed in today's study with  mildly thick liquid (nectar), moderately thick liquid (honey)    SPEECH THERAPY  PLAN OF CARE   The dysphagia POC is established based on physician order and dysphagia diagnosis    Skilled SLP intervention for dysphagia management on acute care up to 5 x per week until goals met, pt plateaus in function and/or 
SPIRITUAL HEALTH SERVICES - [facility]  PROGRESS NOTE    Name: Sandip Abdi                [employer]: [employer]   Anointed (Last Rites): NA    Referral: Routine Visit    Assessment:  Upon entering the room  observes patient siting up in bed.  Patient appeared calm and approachable.     Intervention:   explored patients thoughts, feelings, and concerns. Patient shared about his health and how thankful he is that he is feel so much better today. Patient talked about the support of his children and the exceptional care he has been receiving from nursing staff. Patient also mentioned he is [employer].  actively listened and provided a supportive presence.     Outcome:  Patient expressed gratitude for visit.     Plan:  Chaplains will remain available to offer spiritual and emotional support as needed.      Electronically signed by Chaplain Kami, on 6/10/2024 at 11:12 AM.  Spiritual Care Department  [facility]  [phone]     
Speech Language Pathology      NAME:  Sandip Abdi  :  1931  DATE: 6/10/2024  ROOM:  0207/0207-A    Order received. Chart reviewed.    Order received for bedside CSE due to patient with hx of dysphagia and coughing with honey thick liquids.    Per chart review, patient with very extensive hx of dysphagia. Most recent MBSS completed within [facility] was completed on 22 which revealed severe pharyngeal phase dysphagia and the patient was recommended for NPO at that time. Per further chart review, patient with PEG tube and decision for pleasure feeds.    Due to the known significant hx of dysphagia including aspiration as well as new concern for aspiration of previously utilized diet consistency, the patient is not appropriate for a CSE. A MBSS is needed to determine the safest least restrictive diet at this time.    Wide-complex tachycardia [R00.0]  Pneumonia of left lung due to infectious organism, unspecified part of lung [J18.9]  Pneumonia due to infectious organism [J18.9]  Sepsis, due to unspecified organism, unspecified whether acute organ dysfunction present (HCC) [A41.9]      Crystal Barragan M.S. CCC-SLP/L  Speech Language Pathologist  [id number]         
patient sitting in chair  with call light and phone within reach, all lines and tubes intact.  *ALARM ON , daughter present    Overall patient demonstrated  decreased independence and safety during completion of ADL/functional transfer/mobility tasks.  Pt would benefit from continued skilled OT to increase safety and independence with completion of ADL/IADL tasks for functional independence and quality of life.        Rehab Potential: good for established goals     Patient / Family Goal: return home       Patient and/or family were instructed on functional diagnosis, prognosis/goals and OT plan of care. Demonstrated good  understanding.     Eval Complexity: Low    Time In: 1125  Time Out: 1140      Min Units   OT Eval Low 97165 x  1   OT Eval Medium 06500      OT Eval High 70679      OT Re-Eval 56887       Therapeutic Ex 57652      Therapeutic Activities 18203      ADL/Self Care 68250      Orthotic Management 54898       Manual 97043     Neuro Re-Ed 56551       Non-Billable Time       OT Re eval 32804        Evaluation Time additionally includes thorough review of current medical information, gathering information on past medical history/social history and prior level of function, interpretation of standardized testing/informal observation of tasks, assessment of data and development of plan of care and goals.            Abbey Horta  OTR/L  OT [id number]                           
MBSS, significant pooling of secretions as well as barium residuals in the hypopharynx were noted. However, difficult to determine if pyriform residue spills over to vocal folds or remains in pyriform despite various trials and visualizations. Patient with consistent wet cough during trials, but no blatant aspiration was visualized. SLP recommend continued discussion with physician and family to determine safety of continued oral intake despite potential aspiration risk.      DIET RECOMMENDATIONS: Defer recommendation to physician pending further discussion with patient and family      FEEDING RECOMMENDATIONS:              Assistance level:  Not applicable                Compensatory strategies recommended: Thorough oral care to prevent colonization of oral bacteria   Upright in bed/ chair as tolerated  Fully alert for all PO                  Discussed recommendations with:  patient nurse in person     Laryngeal Penetration and Aspiration:  Penetration WITHOUT aspiration was observed in today's study with  mildly thick liquid (nectar), moderately thick liquid (honey)      MRSA nasal swab negative  CRP 45  Total   proBNP of 5084  Respiratory viral panel negative.     Assessment:    Acute respiratory failure with hypoxia-resolved  Bilateral pneumonia left greater than right  Recent hospitalzation March 13-17, 2024 respiratory failure due to right lung collapse 2/2 mucus plugging. Was intubated and underwent emergent bronch on 3/13/24. BAL cultures with multiple organisms including Pseudomonas, Klebsiella and E. Coli. Treated with Levaquin   Septic vs hypovolemic vs cardiogenic shock  Elevated troponin  VT/wide-complex tachycardia  GERD/Lea's esophagus  UTI  Leukopenia  Sepsis, fevers  Neurogenic dysphagia,s/p PEG tube. Repeat swallow 6/11/24    Plan:   Oxygen weaned off keep SpO2 greater than 92%  Follow chest x-ray 6/12/2024  Zosyn for aspiration coverage until 6/16/2024.  May transition to Augmentin for 
right  Recent hospitalzation March 13-17, 2024 respiratory failure due to right lung collapse 2/2 mucus plugging. Was intubated and underwent emergent bronch on 3/13 24. BAL cultures with multiple organisms including Pseudomonas, Klebsiella and E. Coli. Treated with Levaquin   Septic vs hypovolemic vs cardiogenic shock  Elevated troponin  VT/wide-complex tachycardia  GERD/Lea's esophagus  UTI  Leukopenia  Sepsis, fevers  Neurogenic dysphagia,s/p PEG tube      Plan:   Oxygen weaned off keep SpO2 greater than 92%  Follow chest x-ray 6/9/2024  Stop fluids  Zosyn for aspiration coverage  2D echo completed.  Cardiology following.  DVT, GI prophylaxis  Speech following, most recent modified barium swallow study completed on 6/21/22 which revealed severe pharyngeal phase dysphagia and the patient was recommended for NPO at that time. Per further chart review, patient with PEG tube and decision for pleasure feeds.     This plan of care was reviewed in collaboration with Dr. Mckeon     Electronically signed by CARLOS A Powers CNP on 6/11/2024 at 12:43 PM    This is confirmation that I have personally performed a substantial portion of medical decision making (>50%) related to this patient encounter.  The medications & laboratory data and imagery were discussed and adjusted where necessary. Key issues of the case were discussed among consultants.  Review of CNP documentation was conducted and revisions were made as appropriate. I agree with the above documented exam, problem list and plan of care.    Jaclyn Mckeon MD                  
    Chest Xray (6/10/2024):  Xray Result (most recent):  XR CHEST PORTABLE 06/09/2024    Narrative  EXAMINATION:  ONE XRAY VIEW OF THE CHEST    6/9/2024 5:46 pm    COMPARISON:  06/07/2024    HISTORY:  ORDERING SYSTEM PROVIDED HISTORY: fever and tachy  TECHNOLOGIST PROVIDED HISTORY:  Reason for exam:->fever and tachy    FINDINGS:  The appearance of the chest has worsened compared to the prior examination.  There is now increased left perihilar interstitial and alveolar opacity.  Findings are compatible with pneumonia or asymmetric pulmonary edema.  There  is persistent opacity in the medial right lower chest which could represent  scarring or atelectasis similar to prior examinations.  Left heart is  prominent.  No evidence of pneumothorax.  Stable osseous structures.    Impression  1. New left perihilar interstitial and alveolar opacity.  Findings are  compatible with pneumonia.       ASSESSMENT:     Patient Active Problem List    Diagnosis Date Noted    NSTEMI (non-ST elevated myocardial infarction) ([facility]) 06/10/2024    Ventricular tachycardia (HCC) 06/10/2024    [place] (hard of hearing) 06/10/2024    Lea's esophagus 06/10/2024    Pneumonia due to infectious organism 06/09/2024    Carotid atherosclerosis     Hypertension     Raynaud's disease     Thyroid disease          PLAN:     Neuro  Neurogenic dysphagia   s/p PEG    Respiratory  Acute hypoxic respiratory failure   2/2 bilateral PNA left > right    Wean oxygen as tolerated. Keep O2 sat 90-92%    Cardiovascular  Shock  Possible septic/hypovolemic vs cardiogenic shock   Check cortisol lvl and NICOM, repeat troponin and trend   Continue maintenance fluids  Continue norepinephrine for MAP > 65    VT/Wide complex tachycardia   Reported multiple episodes of VT per ED note will review EKG and telemetry   Received amio bolus, amio stopped now 2/2 bradycardia  Keep K+ > 4 and Mag > 2  Cards consulted, check ECHO, repeat EKG    Elevated troponin   Initial troponin 107, 
details of this case were discussed with Dr. Whatley.    Electronically signed by CARLOS A Jimenez CNP on 6/12/2024 at 9:23 AM    Addendum: I have personally participated in a face-to-face history and physical exam on the date of service with the patient. I have discussed the case with the nurse practitioner. I also participated in medical decision making on the date of service and I agree with all of the pertinent clinical information unless indicated in my editing of the note. I have reviewed and edited the note above based on my findings during my history, exam, and decision making on the same day of service.     My additional thoughts:   He was seen and examined walking in the hallway with therapy  He told me that he is feeling well and he wants to go home today  Case discussed with cardiology and they want to treat his NSTEMI medically and not do a cardiac catheterization  Acute kidney resolved  Transition antibiotics to oral on discharge    Electronically signed by Michael Whatley DO on 6/12/2024 at 12:08 PM    I can be reached through [facility].    
discussed patient with available family.  Discussed case with referring service and non-cardiology consultants.     Greater than 50 minutes was spent counseling the patient, reviewing the rationale for the above recommendations and reviewing the patient's current medication list, problem list and results of all previously ordered testing.    Sue Leiva D.O.  Cardiologist  Cardiology, [facility]

## 2024-06-12 NOTE — PLAN OF CARE
Problem: Discharge Planning  Goal: Discharge to home or other facility with appropriate resources  6/12/2024 0032 by Jarod Schuster RN  Outcome: Progressing  6/11/2024 1142 by Cathy Wild RN  Outcome: Progressing     Problem: Pain  Goal: Verbalizes/displays adequate comfort level or baseline comfort level  6/12/2024 0032 by Jarod Schuster RN  Outcome: Progressing  6/11/2024 1142 by Cathy Wild RN  Outcome: Progressing     Problem: Skin/Tissue Integrity  Goal: Absence of new skin breakdown  Description: 1.  Monitor for areas of redness and/or skin breakdown  2.  Assess vascular access sites hourly  3.  Every 4-6 hours minimum:  Change oxygen saturation probe site  4.  Every 4-6 hours:  If on nasal continuous positive airway pressure, respiratory therapy assess nares and determine need for appliance change or resting period.  6/12/2024 0032 by Jarod Schuster RN  Outcome: Progressing  6/11/2024 1142 by Cathy Wild RN  Outcome: Progressing     Problem: Safety - Adult  Goal: Free from fall injury  6/12/2024 0032 by Jarod Schuster RN  Outcome: Progressing  6/11/2024 1142 by Cathy Wild RN  Outcome: Progressing     Problem: ABCDS Injury Assessment  Goal: Absence of physical injury  6/12/2024 0032 by Jarod Schuster RN  Outcome: Progressing  6/11/2024 1142 by Cathy Wild RN  Outcome: Progressing     Problem: Nutrition Deficit:  Goal: Optimize nutritional status  6/12/2024 0032 by Jarod Schuster RN  Outcome: Progressing  6/11/2024 1142 by Cathy Wild RN  Outcome: Progressing

## 2024-06-12 NOTE — PLAN OF CARE
Problem: Discharge Planning  Goal: Discharge to home or other facility with appropriate resources  6/12/2024 1142 by Lluvia Maguire RN  Outcome: Progressing  6/12/2024 0032 by Jarod Schuster RN  Outcome: Progressing     Problem: Pain  Goal: Verbalizes/displays adequate comfort level or baseline comfort level  6/12/2024 1142 by Lluvia Maguire RN  Outcome: Progressing  6/12/2024 0032 by Jarod Schuster RN  Outcome: Progressing     Problem: Skin/Tissue Integrity  Goal: Absence of new skin breakdown  Description: 1.  Monitor for areas of redness and/or skin breakdown  2.  Assess vascular access sites hourly  3.  Every 4-6 hours minimum:  Change oxygen saturation probe site  4.  Every 4-6 hours:  If on nasal continuous positive airway pressure, respiratory therapy assess nares and determine need for appliance change or resting period.  6/12/2024 1142 by Lluvia Maguire RN  Outcome: Progressing  6/12/2024 0032 by Jarod Schuster RN  Outcome: Progressing     Problem: Safety - Adult  Goal: Free from fall injury  6/12/2024 1142 by Lluvia Maguire RN  Outcome: Progressing  6/12/2024 0032 by Jarod Schuster RN  Outcome: Progressing     Problem: ABCDS Injury Assessment  Goal: Absence of physical injury  6/12/2024 1142 by Lluvia Maguire RN  Outcome: Progressing  6/12/2024 0032 by Jarod Schuster RN  Outcome: Progressing     Problem: Nutrition Deficit:  Goal: Optimize nutritional status  6/12/2024 1142 by Lluvia Maguire RN  Outcome: Progressing  6/12/2024 0032 by Jarod Schuster RN  Outcome: Progressing

## 2024-06-12 NOTE — DISCHARGE INSTR - COC
Continuity of Care Form    Patient Name: Sandip Abdi   :  1931  MRN:  [id number]    Admit date:  2024  Discharge date:  24      Code Status Order: DNR-CCA   Advance Directives:     Admitting Physician:  Michael Whatley DO  PCP: Gary Cleveland DO    Discharging Nurse: vinicio orona RN  Discharging Hospital Unit/Room#: 0642/0642-A  Discharging Unit Phone Number: [phone]    Emergency Contact:   Extended Emergency Contact Information  Primary Emergency Contact: Alyssa Gregg   [facility]  Home Phone: [phone]  Mobile Phone: [phone]  Relation: Child  Secondary Emergency Contact: Dr Salvador Abdi, [facility]  Home Phone: [phone]  Mobile Phone: [phone]  Relation: Child    Past Surgical History:  Past Surgical History:   Procedure Laterality Date    COLONOSCOPY      ENDOSCOPY, COLON, DIAGNOSTIC      EYE SURGERY Bilateral     cataract removal    FINGER AMPUTATION      partial right finger    HEMORRHOID SURGERY      HERNIA REPAIR  2018    [facility]    TONSILLECTOMY      UPPER GASTROINTESTINAL ENDOSCOPY N/A 2019    EGD BIOPSY performed by Salvador Amaya MD at [facility] ENDOSCOPY       Immunization History:   Immunization History   Administered Date(s) Administered    COVID-19, PFIZER PURPLE top, DILUTE for use, (age 12 y+), 30mcg/0.3mL 2021, 02/15/2021, 10/26/2021    COVID-19, PFIZER, (- formula), (age 12y+), IM, 30mcg/0.3mL 2023       Active Problems:  Patient Active Problem List   Diagnosis Code    Carotid atherosclerosis I65.29    Hypertension I10    Raynaud's disease I73.00    Thyroid disease E07.9    Pneumonia due to infectious organism J18.9    [place] (hard of hearing) H91.90    Lea's esophagus K22.70    NSTEMI (non-ST elevated myocardial infarction) (HCC) I21.4    Ventricular tachycardia (HCC) I47.20    Sepsis (HCC) A41.9    Severe protein-calorie malnutrition (HCC) E43       Isolation/Infection:

## 2024-06-13 ENCOUNTER — TELEPHONE (OUTPATIENT)
Dept: ADMINISTRATIVE | Age: 89
End: 2024-06-13

## 2024-06-13 LAB
EKG ATRIAL RATE: 49 BPM
EKG P AXIS: 53 DEGREES
EKG P-R INTERVAL: 188 MS
EKG Q-T INTERVAL: 476 MS
EKG QRS DURATION: 150 MS
EKG QTC CALCULATION (BAZETT): 429 MS
EKG R AXIS: -38 DEGREES
EKG T AXIS: 97 DEGREES
EKG VENTRICULAR RATE: 49 BPM

## 2024-06-14 LAB
MICROORGANISM SPEC CULT: NORMAL
MICROORGANISM SPEC CULT: NORMAL
SERVICE CMNT-IMP: NORMAL
SERVICE CMNT-IMP: NORMAL
SPECIMEN DESCRIPTION: NORMAL
SPECIMEN DESCRIPTION: NORMAL

## 2024-07-10 ENCOUNTER — APPOINTMENT (OUTPATIENT)
Dept: GENERAL RADIOLOGY | Age: 89
DRG: 193 | End: 2024-07-10
Payer: MEDICARE

## 2024-07-10 ENCOUNTER — HOSPITAL ENCOUNTER (INPATIENT)
Age: 89
LOS: 4 days | Discharge: HOME HEALTH | DRG: 193 | End: 2024-07-14
Attending: EMERGENCY MEDICINE | Admitting: INTERNAL MEDICINE
Payer: MEDICARE

## 2024-07-10 DIAGNOSIS — R50.9 FEBRILE ILLNESS: Primary | ICD-10-CM

## 2024-07-10 DIAGNOSIS — R65.10 SIRS (SYSTEMIC INFLAMMATORY RESPONSE SYNDROME) (HCC): ICD-10-CM

## 2024-07-10 DIAGNOSIS — R41.0 INTERMITTENT CONFUSION: ICD-10-CM

## 2024-07-10 PROBLEM — J18.9 PNEUMONIA DUE TO ORGANISM: Status: ACTIVE | Noted: 2024-07-10

## 2024-07-10 LAB
ALBUMIN SERPL-MCNC: 3.5 G/DL (ref 3.5–5.2)
ALP SERPL-CCNC: 117 U/L (ref 40–129)
ALT SERPL-CCNC: 25 U/L (ref 0–40)
ANION GAP SERPL CALCULATED.3IONS-SCNC: 10 MMOL/L (ref 7–16)
AST SERPL-CCNC: 33 U/L (ref 0–39)
B PARAP IS1001 DNA NPH QL NAA+NON-PROBE: NOT DETECTED
B PERT DNA SPEC QL NAA+PROBE: NOT DETECTED
BASOPHILS # BLD: 0.02 K/UL (ref 0–0.2)
BASOPHILS NFR BLD: 0 % (ref 0–2)
BILIRUB DIRECT SERPL-MCNC: <0.2 MG/DL (ref 0–0.3)
BILIRUB INDIRECT SERPL-MCNC: NORMAL MG/DL (ref 0–1)
BILIRUB SERPL-MCNC: 0.6 MG/DL (ref 0–1.2)
BILIRUB UR QL STRIP: NEGATIVE
BUN SERPL-MCNC: 32 MG/DL (ref 6–23)
C PNEUM DNA NPH QL NAA+NON-PROBE: NOT DETECTED
CALCIUM SERPL-MCNC: 9.1 MG/DL (ref 8.6–10.2)
CHLORIDE SERPL-SCNC: 97 MMOL/L (ref 98–107)
CLARITY UR: CLEAR
CO2 SERPL-SCNC: 25 MMOL/L (ref 22–29)
COLOR UR: YELLOW
CREAT SERPL-MCNC: 0.7 MG/DL (ref 0.7–1.2)
EOSINOPHIL # BLD: 0 K/UL (ref 0.05–0.5)
EOSINOPHILS RELATIVE PERCENT: 0 % (ref 0–6)
ERYTHROCYTE [DISTWIDTH] IN BLOOD BY AUTOMATED COUNT: 14.6 % (ref 11.5–15)
FLUAV RNA NPH QL NAA+NON-PROBE: NOT DETECTED
FLUBV RNA NPH QL NAA+NON-PROBE: NOT DETECTED
GFR, ESTIMATED: 85 ML/MIN/1.73M2
GLUCOSE SERPL-MCNC: 173 MG/DL (ref 74–99)
GLUCOSE UR STRIP-MCNC: NEGATIVE MG/DL
HADV DNA NPH QL NAA+NON-PROBE: NOT DETECTED
HCOV 229E RNA NPH QL NAA+NON-PROBE: NOT DETECTED
HCOV HKU1 RNA NPH QL NAA+NON-PROBE: NOT DETECTED
HCOV NL63 RNA NPH QL NAA+NON-PROBE: NOT DETECTED
HCOV OC43 RNA NPH QL NAA+NON-PROBE: NOT DETECTED
HCT VFR BLD AUTO: 35.8 % (ref 37–54)
HGB BLD-MCNC: 11.7 G/DL (ref 12.5–16.5)
HGB UR QL STRIP.AUTO: NEGATIVE
HMPV RNA NPH QL NAA+NON-PROBE: NOT DETECTED
HPIV1 RNA NPH QL NAA+NON-PROBE: NOT DETECTED
HPIV2 RNA NPH QL NAA+NON-PROBE: NOT DETECTED
HPIV3 RNA NPH QL NAA+NON-PROBE: NOT DETECTED
HPIV4 RNA NPH QL NAA+NON-PROBE: NOT DETECTED
IMM GRANULOCYTES # BLD AUTO: <0.03 K/UL (ref 0–0.58)
IMM GRANULOCYTES NFR BLD: 0 % (ref 0–5)
KETONES UR STRIP-MCNC: NEGATIVE MG/DL
LACTATE BLDV-SCNC: 1.4 MMOL/L (ref 0.5–1.9)
LACTATE BLDV-SCNC: 2 MMOL/L (ref 0.5–1.9)
LEUKOCYTE ESTERASE UR QL STRIP: ABNORMAL
LYMPHOCYTES NFR BLD: 0.18 K/UL (ref 1.5–4)
LYMPHOCYTES RELATIVE PERCENT: 2 % (ref 20–42)
M PNEUMO DNA NPH QL NAA+NON-PROBE: NOT DETECTED
MCH RBC QN AUTO: 31.5 PG (ref 26–35)
MCHC RBC AUTO-ENTMCNC: 32.7 G/DL (ref 32–34.5)
MCV RBC AUTO: 96.5 FL (ref 80–99.9)
MONOCYTES NFR BLD: 0.33 K/UL (ref 0.1–0.95)
MONOCYTES NFR BLD: 4 % (ref 2–12)
NEUTROPHILS NFR BLD: 94 % (ref 43–80)
NEUTS SEG NFR BLD: 7.85 K/UL (ref 1.8–7.3)
NITRITE UR QL STRIP: NEGATIVE
PH UR STRIP: 7 [PH] (ref 5–9)
PLATELET # BLD AUTO: 131 K/UL (ref 130–450)
PMV BLD AUTO: 10.2 FL (ref 7–12)
POTASSIUM SERPL-SCNC: 4.4 MMOL/L (ref 3.5–5)
PROT SERPL-MCNC: 7 G/DL (ref 6.4–8.3)
PROT UR STRIP-MCNC: NEGATIVE MG/DL
RBC # BLD AUTO: 3.71 M/UL (ref 3.8–5.8)
RBC # BLD: NORMAL 10*6/UL
RBC #/AREA URNS HPF: NORMAL /HPF
RSV RNA NPH QL NAA+NON-PROBE: NOT DETECTED
RV+EV RNA NPH QL NAA+NON-PROBE: NOT DETECTED
SARS-COV-2 RNA NPH QL NAA+NON-PROBE: NOT DETECTED
SODIUM SERPL-SCNC: 132 MMOL/L (ref 132–146)
SP GR UR STRIP: 1.01 (ref 1–1.03)
SPECIMEN DESCRIPTION: NORMAL
UROBILINOGEN UR STRIP-ACNC: 1 EU/DL (ref 0–1)
WBC #/AREA URNS HPF: NORMAL /HPF
WBC OTHER # BLD: 8.4 K/UL (ref 4.5–11.5)

## 2024-07-10 PROCEDURE — 83605 ASSAY OF LACTIC ACID: CPT

## 2024-07-10 PROCEDURE — 6370000000 HC RX 637 (ALT 250 FOR IP): Performed by: EMERGENCY MEDICINE

## 2024-07-10 PROCEDURE — 2580000003 HC RX 258: Performed by: EMERGENCY MEDICINE

## 2024-07-10 PROCEDURE — 87086 URINE CULTURE/COLONY COUNT: CPT

## 2024-07-10 PROCEDURE — 2060000000 HC ICU INTERMEDIATE R&B

## 2024-07-10 PROCEDURE — 51701 INSERT BLADDER CATHETER: CPT

## 2024-07-10 PROCEDURE — 6360000002 HC RX W HCPCS: Performed by: EMERGENCY MEDICINE

## 2024-07-10 PROCEDURE — 80053 COMPREHEN METABOLIC PANEL: CPT

## 2024-07-10 PROCEDURE — 82248 BILIRUBIN DIRECT: CPT

## 2024-07-10 PROCEDURE — 96375 TX/PRO/DX INJ NEW DRUG ADDON: CPT

## 2024-07-10 PROCEDURE — 99285 EMERGENCY DEPT VISIT HI MDM: CPT

## 2024-07-10 PROCEDURE — 0202U NFCT DS 22 TRGT SARS-COV-2: CPT

## 2024-07-10 PROCEDURE — 71045 X-RAY EXAM CHEST 1 VIEW: CPT

## 2024-07-10 PROCEDURE — 81001 URINALYSIS AUTO W/SCOPE: CPT

## 2024-07-10 PROCEDURE — 96374 THER/PROPH/DIAG INJ IV PUSH: CPT

## 2024-07-10 PROCEDURE — 87040 BLOOD CULTURE FOR BACTERIA: CPT

## 2024-07-10 PROCEDURE — 93005 ELECTROCARDIOGRAM TRACING: CPT | Performed by: EMERGENCY MEDICINE

## 2024-07-10 PROCEDURE — 85025 COMPLETE CBC W/AUTO DIFF WBC: CPT

## 2024-07-10 RX ORDER — SENNOSIDES A AND B 8.6 MG/1
1 TABLET, FILM COATED ORAL DAILY PRN
Status: DISCONTINUED | OUTPATIENT
Start: 2024-07-10 | End: 2024-07-10

## 2024-07-10 RX ORDER — ACETAMINOPHEN 325 MG/1
650 TABLET ORAL EVERY 6 HOURS PRN
Status: DISCONTINUED | OUTPATIENT
Start: 2024-07-10 | End: 2024-07-10

## 2024-07-10 RX ORDER — POTASSIUM CHLORIDE 20 MEQ/1
40 TABLET, EXTENDED RELEASE ORAL PRN
Status: DISCONTINUED | OUTPATIENT
Start: 2024-07-10 | End: 2024-07-10

## 2024-07-10 RX ORDER — ENOXAPARIN SODIUM 100 MG/ML
40 INJECTION SUBCUTANEOUS DAILY
Status: DISCONTINUED | OUTPATIENT
Start: 2024-07-10 | End: 2024-07-14 | Stop reason: HOSPADM

## 2024-07-10 RX ORDER — ONDANSETRON 4 MG/1
4 TABLET, ORALLY DISINTEGRATING ORAL EVERY 8 HOURS PRN
Status: DISCONTINUED | OUTPATIENT
Start: 2024-07-10 | End: 2024-07-10

## 2024-07-10 RX ORDER — ATORVASTATIN CALCIUM 40 MG/1
40 TABLET, FILM COATED ORAL NIGHTLY
Status: DISCONTINUED | OUTPATIENT
Start: 2024-07-11 | End: 2024-07-10

## 2024-07-10 RX ORDER — SODIUM CHLORIDE 0.9 % (FLUSH) 0.9 %
10 SYRINGE (ML) INJECTION EVERY 12 HOURS SCHEDULED
Status: DISCONTINUED | OUTPATIENT
Start: 2024-07-10 | End: 2024-07-14 | Stop reason: HOSPADM

## 2024-07-10 RX ORDER — METRONIDAZOLE 500 MG/100ML
500 INJECTION, SOLUTION INTRAVENOUS ONCE
Status: COMPLETED | OUTPATIENT
Start: 2024-07-10 | End: 2024-07-10

## 2024-07-10 RX ORDER — POTASSIUM CHLORIDE 7.45 MG/ML
10 INJECTION INTRAVENOUS PRN
Status: DISCONTINUED | OUTPATIENT
Start: 2024-07-10 | End: 2024-07-14 | Stop reason: HOSPADM

## 2024-07-10 RX ORDER — ASPIRIN 81 MG/1
81 TABLET, CHEWABLE ORAL NIGHTLY
Status: DISCONTINUED | OUTPATIENT
Start: 2024-07-10 | End: 2024-07-14 | Stop reason: HOSPADM

## 2024-07-10 RX ORDER — ASPIRIN 81 MG/1
81 TABLET, CHEWABLE ORAL DAILY
Status: DISCONTINUED | OUTPATIENT
Start: 2024-07-11 | End: 2024-07-10

## 2024-07-10 RX ORDER — 0.9 % SODIUM CHLORIDE 0.9 %
1000 INTRAVENOUS SOLUTION INTRAVENOUS ONCE
Status: COMPLETED | OUTPATIENT
Start: 2024-07-10 | End: 2024-07-10

## 2024-07-10 RX ORDER — GUAIFENESIN 400 MG/1
400 TABLET ORAL 3 TIMES DAILY
Status: DISCONTINUED | OUTPATIENT
Start: 2024-07-10 | End: 2024-07-10

## 2024-07-10 RX ORDER — ACETAMINOPHEN 650 MG/1
650 SUPPOSITORY RECTAL EVERY 6 HOURS PRN
Status: DISCONTINUED | OUTPATIENT
Start: 2024-07-10 | End: 2024-07-14 | Stop reason: HOSPADM

## 2024-07-10 RX ORDER — LEVOTHYROXINE SODIUM 0.1 MG/1
100 TABLET ORAL DAILY
Status: DISCONTINUED | OUTPATIENT
Start: 2024-07-11 | End: 2024-07-10

## 2024-07-10 RX ORDER — ATORVASTATIN CALCIUM 40 MG/1
40 TABLET, FILM COATED ORAL NIGHTLY
Status: DISCONTINUED | OUTPATIENT
Start: 2024-07-10 | End: 2024-07-10

## 2024-07-10 RX ORDER — SODIUM CHLORIDE 0.9 % (FLUSH) 0.9 %
10 SYRINGE (ML) INJECTION PRN
Status: DISCONTINUED | OUTPATIENT
Start: 2024-07-10 | End: 2024-07-14 | Stop reason: HOSPADM

## 2024-07-10 RX ORDER — ACETAMINOPHEN 325 MG/1
TABLET ORAL
Status: DISPENSED
Start: 2024-07-10 | End: 2024-07-11

## 2024-07-10 RX ORDER — FAMOTIDINE 20 MG/1
20 TABLET, FILM COATED ORAL 2 TIMES DAILY
Status: DISCONTINUED | OUTPATIENT
Start: 2024-07-10 | End: 2024-07-10

## 2024-07-10 RX ORDER — LANOLIN ALCOHOL/MO/W.PET/CERES
5 CREAM (GRAM) TOPICAL NIGHTLY
Status: DISCONTINUED | OUTPATIENT
Start: 2024-07-10 | End: 2024-07-10

## 2024-07-10 RX ORDER — POTASSIUM CHLORIDE 20 MEQ/1
40 TABLET, EXTENDED RELEASE ORAL PRN
Status: DISCONTINUED | OUTPATIENT
Start: 2024-07-10 | End: 2024-07-14 | Stop reason: HOSPADM

## 2024-07-10 RX ORDER — ASPIRIN 81 MG/1
81 TABLET ORAL DAILY
Status: DISCONTINUED | OUTPATIENT
Start: 2024-07-10 | End: 2024-07-10 | Stop reason: CLARIF

## 2024-07-10 RX ORDER — PRAMIPEXOLE DIHYDROCHLORIDE 0.25 MG/1
0.5 TABLET ORAL NIGHTLY
Status: COMPLETED | OUTPATIENT
Start: 2024-07-10 | End: 2024-07-11

## 2024-07-10 RX ORDER — ACETAMINOPHEN 650 MG/1
650 SUPPOSITORY RECTAL EVERY 6 HOURS PRN
Status: DISCONTINUED | OUTPATIENT
Start: 2024-07-10 | End: 2024-07-10

## 2024-07-10 RX ORDER — ACETAMINOPHEN 160 MG/5ML
650 LIQUID ORAL EVERY 6 HOURS PRN
Status: DISCONTINUED | OUTPATIENT
Start: 2024-07-10 | End: 2024-07-14 | Stop reason: HOSPADM

## 2024-07-10 RX ORDER — ATORVASTATIN CALCIUM 40 MG/1
40 TABLET, FILM COATED ORAL EVERY MORNING
Status: DISCONTINUED | OUTPATIENT
Start: 2024-07-11 | End: 2024-07-14 | Stop reason: HOSPADM

## 2024-07-10 RX ORDER — ACETAMINOPHEN 325 MG/1
650 TABLET ORAL ONCE
Status: COMPLETED | OUTPATIENT
Start: 2024-07-10 | End: 2024-07-10

## 2024-07-10 RX ORDER — ONDANSETRON 2 MG/ML
4 INJECTION INTRAMUSCULAR; INTRAVENOUS EVERY 6 HOURS PRN
Status: DISCONTINUED | OUTPATIENT
Start: 2024-07-10 | End: 2024-07-14 | Stop reason: HOSPADM

## 2024-07-10 RX ORDER — DOXAZOSIN 2 MG/1
2 TABLET ORAL NIGHTLY
Status: DISCONTINUED | OUTPATIENT
Start: 2024-07-10 | End: 2024-07-14 | Stop reason: HOSPADM

## 2024-07-10 RX ORDER — ONDANSETRON 2 MG/ML
4 INJECTION INTRAMUSCULAR; INTRAVENOUS EVERY 6 HOURS PRN
Status: DISCONTINUED | OUTPATIENT
Start: 2024-07-10 | End: 2024-07-10

## 2024-07-10 RX ORDER — POTASSIUM CHLORIDE 7.45 MG/ML
10 INJECTION INTRAVENOUS PRN
Status: DISCONTINUED | OUTPATIENT
Start: 2024-07-10 | End: 2024-07-10

## 2024-07-10 RX ORDER — ONDANSETRON 4 MG/1
4 TABLET, ORALLY DISINTEGRATING ORAL EVERY 8 HOURS PRN
Status: DISCONTINUED | OUTPATIENT
Start: 2024-07-10 | End: 2024-07-14 | Stop reason: HOSPADM

## 2024-07-10 RX ORDER — SODIUM CHLORIDE 9 MG/ML
INJECTION, SOLUTION INTRAVENOUS PRN
Status: DISCONTINUED | OUTPATIENT
Start: 2024-07-10 | End: 2024-07-14 | Stop reason: HOSPADM

## 2024-07-10 RX ORDER — LEVOTHYROXINE SODIUM 0.1 MG/1
100 TABLET ORAL DAILY
Status: DISCONTINUED | OUTPATIENT
Start: 2024-07-11 | End: 2024-07-14 | Stop reason: HOSPADM

## 2024-07-10 RX ORDER — FAMOTIDINE 20 MG/1
20 TABLET, FILM COATED ORAL 2 TIMES DAILY
Status: DISCONTINUED | OUTPATIENT
Start: 2024-07-11 | End: 2024-07-14 | Stop reason: HOSPADM

## 2024-07-10 RX ORDER — MAGNESIUM SULFATE IN WATER 40 MG/ML
2000 INJECTION, SOLUTION INTRAVENOUS PRN
Status: DISCONTINUED | OUTPATIENT
Start: 2024-07-10 | End: 2024-07-14 | Stop reason: HOSPADM

## 2024-07-10 RX ORDER — LANOLIN ALCOHOL/MO/W.PET/CERES
6 CREAM (GRAM) TOPICAL NIGHTLY
Status: DISCONTINUED | OUTPATIENT
Start: 2024-07-11 | End: 2024-07-14 | Stop reason: HOSPADM

## 2024-07-10 RX ADMIN — WATER 2000 MG: 1 INJECTION INTRAMUSCULAR; INTRAVENOUS; SUBCUTANEOUS at 20:23

## 2024-07-10 RX ADMIN — METRONIDAZOLE 500 MG: 500 INJECTION, SOLUTION INTRAVENOUS at 20:26

## 2024-07-10 RX ADMIN — SODIUM CHLORIDE 1000 ML: 9 INJECTION, SOLUTION INTRAVENOUS at 16:16

## 2024-07-10 RX ADMIN — ACETAMINOPHEN 650 MG: 325 TABLET ORAL at 16:16

## 2024-07-10 ASSESSMENT — PAIN SCALES - GENERAL: PAINLEVEL_OUTOF10: 0

## 2024-07-10 ASSESSMENT — PAIN - FUNCTIONAL ASSESSMENT: PAIN_FUNCTIONAL_ASSESSMENT: NONE - DENIES PAIN

## 2024-07-10 NOTE — ED PROVIDER NOTES
89-year-old male presents to the emergency department with family for change in mental status with fevers.  Patient was seen by his PCP earlier today and diagnosed with a urinary tract infection he was to be started on cefdinir but patient became more confused and had a noted temperature of 101 prompting family to come to the emergency department for evaluation.  Patient reports had decreased p.o. intake and they feel he is dry.  They report no cough congestion headache vision change.  They state no abdominal pain nausea vomiting constipation diarrhea or other concerns at this time.  They report no rashes or wounds.  Patient primarily feeds through a PEG tube as a reported history of aspiration but her family reports he has not had any p.o. intake recently    The history is provided by the patient.   Fever  Max temp prior to arrival:  101  Temp source:  Oral  Severity:  Mild  Onset quality:  Gradual  Duration:  2 days  Timing:  Intermittent  Progression:  Waxing and waning  Chronicity:  New  Associated symptoms: dysuria    Associated symptoms: no chest pain, no chills, no cough, no diarrhea, no ear pain, no headaches, no nausea, no rash, no sore throat and no vomiting         Review of Systems   Constitutional:  Positive for fever. Negative for chills.   HENT:  Negative for ear pain, sinus pressure and sore throat.    Eyes:  Negative for pain, discharge and redness.   Respiratory:  Negative for cough, shortness of breath and wheezing.    Cardiovascular:  Negative for chest pain.   Gastrointestinal:  Negative for abdominal pain, diarrhea, nausea and vomiting.   Genitourinary:  Positive for dysuria. Negative for frequency.   Musculoskeletal:  Negative for arthralgias and back pain.   Skin:  Negative for rash and wound.   Neurological:  Negative for weakness and headaches.   Hematological:  Negative for adenopathy.   All other systems reviewed and are negative.       Physical Exam  Constitutional:       Appearance:

## 2024-07-11 LAB
ALBUMIN SERPL-MCNC: 2.6 G/DL (ref 3.5–5.2)
ALP SERPL-CCNC: 86 U/L (ref 40–129)
ALT SERPL-CCNC: 18 U/L (ref 0–40)
ANION GAP SERPL CALCULATED.3IONS-SCNC: 8 MMOL/L (ref 7–16)
AST SERPL-CCNC: 26 U/L (ref 0–39)
BASOPHILS # BLD: 0.01 K/UL (ref 0–0.2)
BASOPHILS NFR BLD: 0 % (ref 0–2)
BILIRUB SERPL-MCNC: 0.4 MG/DL (ref 0–1.2)
BUN SERPL-MCNC: 28 MG/DL (ref 6–23)
CALCIUM SERPL-MCNC: 8.4 MG/DL (ref 8.6–10.2)
CHLORIDE SERPL-SCNC: 105 MMOL/L (ref 98–107)
CO2 SERPL-SCNC: 23 MMOL/L (ref 22–29)
CREAT SERPL-MCNC: 0.6 MG/DL (ref 0.7–1.2)
EKG ATRIAL RATE: 82 BPM
EKG ATRIAL RATE: 85 BPM
EKG P AXIS: 50 DEGREES
EKG P AXIS: 53 DEGREES
EKG P-R INTERVAL: 162 MS
EKG P-R INTERVAL: 172 MS
EKG Q-T INTERVAL: 378 MS
EKG Q-T INTERVAL: 380 MS
EKG QRS DURATION: 138 MS
EKG QRS DURATION: 140 MS
EKG QTC CALCULATION (BAZETT): 443 MS
EKG QTC CALCULATION (BAZETT): 449 MS
EKG R AXIS: -33 DEGREES
EKG R AXIS: -6 DEGREES
EKG T AXIS: 124 DEGREES
EKG T AXIS: 153 DEGREES
EKG VENTRICULAR RATE: 82 BPM
EKG VENTRICULAR RATE: 85 BPM
EOSINOPHIL # BLD: 0.03 K/UL (ref 0.05–0.5)
EOSINOPHILS RELATIVE PERCENT: 1 % (ref 0–6)
ERYTHROCYTE [DISTWIDTH] IN BLOOD BY AUTOMATED COUNT: 14.7 % (ref 11.5–15)
GFR, ESTIMATED: >90 ML/MIN/1.73M2
GLUCOSE SERPL-MCNC: 96 MG/DL (ref 74–99)
HCT VFR BLD AUTO: 28.8 % (ref 37–54)
HGB BLD-MCNC: 9.2 G/DL (ref 12.5–16.5)
IMM GRANULOCYTES # BLD AUTO: <0.03 K/UL (ref 0–0.58)
IMM GRANULOCYTES NFR BLD: 0 % (ref 0–5)
LYMPHOCYTES NFR BLD: 0.69 K/UL (ref 1.5–4)
LYMPHOCYTES RELATIVE PERCENT: 13 % (ref 20–42)
MCH RBC QN AUTO: 31.1 PG (ref 26–35)
MCHC RBC AUTO-ENTMCNC: 31.9 G/DL (ref 32–34.5)
MCV RBC AUTO: 97.3 FL (ref 80–99.9)
MONOCYTES NFR BLD: 0.39 K/UL (ref 0.1–0.95)
MONOCYTES NFR BLD: 8 % (ref 2–12)
NEUTROPHILS NFR BLD: 78 % (ref 43–80)
NEUTS SEG NFR BLD: 4.1 K/UL (ref 1.8–7.3)
PLATELET, FLUORESCENCE: 120 K/UL (ref 130–450)
PMV BLD AUTO: 10 FL (ref 7–12)
POTASSIUM SERPL-SCNC: 4 MMOL/L (ref 3.5–5)
PROT SERPL-MCNC: 5.4 G/DL (ref 6.4–8.3)
RBC # BLD AUTO: 2.96 M/UL (ref 3.8–5.8)
SODIUM SERPL-SCNC: 136 MMOL/L (ref 132–146)
WBC OTHER # BLD: 5.2 K/UL (ref 4.5–11.5)

## 2024-07-11 PROCEDURE — 2580000003 HC RX 258: Performed by: NURSE PRACTITIONER

## 2024-07-11 PROCEDURE — 6370000000 HC RX 637 (ALT 250 FOR IP): Performed by: NURSE PRACTITIONER

## 2024-07-11 PROCEDURE — 2060000000 HC ICU INTERMEDIATE R&B

## 2024-07-11 PROCEDURE — 80053 COMPREHEN METABOLIC PANEL: CPT

## 2024-07-11 PROCEDURE — 93010 ELECTROCARDIOGRAM REPORT: CPT | Performed by: INTERNAL MEDICINE

## 2024-07-11 PROCEDURE — 2500000003 HC RX 250 WO HCPCS: Performed by: NURSE PRACTITIONER

## 2024-07-11 PROCEDURE — 6360000002 HC RX W HCPCS: Performed by: NURSE PRACTITIONER

## 2024-07-11 PROCEDURE — 6370000000 HC RX 637 (ALT 250 FOR IP): Performed by: EMERGENCY MEDICINE

## 2024-07-11 PROCEDURE — 97161 PT EVAL LOW COMPLEX 20 MIN: CPT

## 2024-07-11 PROCEDURE — 2500000003 HC RX 250 WO HCPCS: Performed by: EMERGENCY MEDICINE

## 2024-07-11 PROCEDURE — 97165 OT EVAL LOW COMPLEX 30 MIN: CPT

## 2024-07-11 PROCEDURE — 85025 COMPLETE CBC W/AUTO DIFF WBC: CPT

## 2024-07-11 PROCEDURE — 2580000003 HC RX 258: Performed by: EMERGENCY MEDICINE

## 2024-07-11 RX ORDER — 0.9 % SODIUM CHLORIDE 0.9 %
500 INTRAVENOUS SOLUTION INTRAVENOUS ONCE
Status: COMPLETED | OUTPATIENT
Start: 2024-07-11 | End: 2024-07-11

## 2024-07-11 RX ADMIN — SODIUM CHLORIDE, PRESERVATIVE FREE 10 ML: 5 INJECTION INTRAVENOUS at 08:43

## 2024-07-11 RX ADMIN — DOXYCYCLINE 100 MG: 100 INJECTION, POWDER, LYOPHILIZED, FOR SOLUTION INTRAVENOUS at 00:36

## 2024-07-11 RX ADMIN — FAMOTIDINE 20 MG: 20 TABLET ORAL at 08:43

## 2024-07-11 RX ADMIN — PRAMIPEXOLE DIHYDROCHLORIDE 0.5 MG: 0.25 TABLET ORAL at 00:38

## 2024-07-11 RX ADMIN — ASPIRIN 81 MG CHEWABLE TABLET 81 MG: 81 TABLET CHEWABLE at 21:13

## 2024-07-11 RX ADMIN — Medication 2 SPRAY: at 10:37

## 2024-07-11 RX ADMIN — GUAIFENESIN 400 MG: 100 LIQUID ORAL at 21:13

## 2024-07-11 RX ADMIN — ATORVASTATIN CALCIUM 40 MG: 40 TABLET, FILM COATED ORAL at 08:42

## 2024-07-11 RX ADMIN — DOXYCYCLINE 100 MG: 100 INJECTION, POWDER, LYOPHILIZED, FOR SOLUTION INTRAVENOUS at 21:19

## 2024-07-11 RX ADMIN — SODIUM CHLORIDE 500 ML: 9 INJECTION, SOLUTION INTRAVENOUS at 05:40

## 2024-07-11 RX ADMIN — ACETAMINOPHEN 650 MG: 650 SOLUTION ORAL at 00:38

## 2024-07-11 RX ADMIN — Medication 6 MG: at 21:13

## 2024-07-11 RX ADMIN — ENOXAPARIN SODIUM 40 MG: 100 INJECTION SUBCUTANEOUS at 08:42

## 2024-07-11 RX ADMIN — DOXAZOSIN 2 MG: 2 TABLET ORAL at 00:38

## 2024-07-11 RX ADMIN — GUAIFENESIN 400 MG: 100 LIQUID ORAL at 08:42

## 2024-07-11 RX ADMIN — SODIUM CHLORIDE, PRESERVATIVE FREE 10 ML: 5 INJECTION INTRAVENOUS at 00:38

## 2024-07-11 RX ADMIN — GUAIFENESIN 400 MG: 100 LIQUID ORAL at 16:26

## 2024-07-11 RX ADMIN — WATER 2000 MG: 1 INJECTION INTRAMUSCULAR; INTRAVENOUS; SUBCUTANEOUS at 21:13

## 2024-07-11 RX ADMIN — FAMOTIDINE 20 MG: 20 TABLET ORAL at 21:13

## 2024-07-11 RX ADMIN — PETROLATUM: 420 OINTMENT TOPICAL at 10:37

## 2024-07-11 RX ADMIN — PETROLATUM: 420 OINTMENT TOPICAL at 21:14

## 2024-07-11 RX ADMIN — ASPIRIN 81 MG CHEWABLE TABLET 81 MG: 81 TABLET CHEWABLE at 00:38

## 2024-07-11 RX ADMIN — LEVOTHYROXINE SODIUM 100 MCG: 100 TABLET ORAL at 05:26

## 2024-07-11 RX ADMIN — DOXAZOSIN 2 MG: 2 TABLET ORAL at 21:13

## 2024-07-11 RX ADMIN — DOXYCYCLINE 100 MG: 100 INJECTION, POWDER, LYOPHILIZED, FOR SOLUTION INTRAVENOUS at 08:59

## 2024-07-11 RX ADMIN — SODIUM CHLORIDE, PRESERVATIVE FREE 10 ML: 5 INJECTION INTRAVENOUS at 21:14

## 2024-07-11 RX ADMIN — SENNOSIDES 8.8 MG: 8.8 LIQUID ORAL at 16:26

## 2024-07-11 RX ADMIN — ENOXAPARIN SODIUM 40 MG: 100 INJECTION SUBCUTANEOUS at 00:38

## 2024-07-11 NOTE — H&P
Internal Medicine History & Physical     Name: Sandip Abdi  : 1931  Chief Complaint: Fatigue (Increased weakness over the past 24 hours, dysuria, dx with uti by pcp), Fever, and Urinary Tract Infection  Primary Care Physician: Gary Cleveland DO  Admission date: 7/10/2024  Date of service: 2024     History of Present Illness  Sandip is a 89 y.o. year old male.  Patient had 3 to 4 days of worsening weakness and fatigue.  States he began having some confusion as well.  Denies chest pains.  Patient admits to some shortness of breath.  Additionally, patient states he had mild cough.  Patient is using tube feeding.  Tolerating.  No recent medication changes.  States he was continuing to worsen at home and nothing was making him feel better.  Patient without any skin rashes or skin lesions.  Patient moving his bowels well.  No other complaints at this time.      ED course:   Initial blood work and imaging studies performed. Admission recommended by ED physician. Case was discussed with ED provider. Meds in ED consisted of the following:  Medications   sodium chloride flush 0.9 % injection 10 mL (has no administration in time range)   acetaminophen (TYLENOL) 325 MG tablet (  Not Given 7/10/24 182)   doxazosin (CARDURA) tablet 2 mg (2 mg PEG Tube Given 24 0038)   cefTRIAXone (ROCEPHIN) 2,000 mg in sterile water 20 mL IV syringe (has no administration in time range)   doxycycline (VIBRAMYCIN) 100 mg in sodium chloride 0.9 % 100 mL IVPB (0 mg IntraVENous Stopped 24 1123)   sodium chloride flush 0.9 % injection 10 mL (10 mLs IntraVENous Given 24 0843)   sodium chloride flush 0.9 % injection 10 mL (has no administration in time range)   0.9 % sodium chloride infusion (has no administration in time range)   magnesium sulfate 2000 mg in 50 mL IVPB premix (has no administration in time range)   enoxaparin (LOVENOX) injection 40 mg (40 mg SubCUTAneous Given 24 0842)   levothyroxine (SYNTHROID)

## 2024-07-11 NOTE — ACP (ADVANCE CARE PLANNING)
Advance Care Planning   Healthcare Decision Maker:    Primary Decision Maker: Alyssa Gregg - Fani - [phone]    Click here to complete Healthcare Decision Makers including selection of the Healthcare Decision Maker Relationship (ie \"Primary\").

## 2024-07-11 NOTE — CONSULTS
Comprehensive Nutrition Assessment    Type and Reason for Visit:  Initial, Consult (TF Ordering and Management)    Nutrition Recommendations/Plan:   To increase protein intake in TF, recommend continue current TF order as tolerated:    7/11 TF ORDER: Standard TF with Fiber (Jevity 1.5) bolus 250 ml x 5/d with Protein Modular (Proteinex 2go) once daily and water flushes 60 ml before and after each bolus  This will provide: 1250 ml/d, 1975 total samantha, 106 g total protein, 1550 ml total free water    Recommend TF bolus to be given at least 30 min after Synthroid (as per Mar rec)  Continue inpatient monitoring     Malnutrition Assessment:  Malnutrition Status:  Severe malnutrition (07/11/24 0900)    Context:  Chronic Illness     Findings of the 6 clinical characteristics of malnutrition:  Energy Intake:  Unable to assess  Weight Loss:  Unable to assess (possible 12.5% loss over the last month, if current wt is accurate (?))     Body Fat Loss:  Mild body fat loss (Moderate) Orbital, Triceps   Muscle Mass Loss:  Severe muscle mass loss Clavicles (pectoralis & deltoids), Scapula (trapezius), Hand (interosseous)  Fluid Accumulation:  No significant fluid accumulation     Strength:  Not Performed    Nutrition Assessment:    Pt admit 2/2 PNA. Uses PEG for EN at home d/t aspiration/dysphagia. Consult re: low albumin/total protein labs and may need increased protein in TF. Current TF order meets >100% energy needs and 100% min protein needs. Will add Protein Modular to increase protein intake and continue to monitor pt tolerance.    Nutrition Related Findings:    A&Ox3, PEG clamped, no edema, [place] soft abd +BS, Synthroid Wound Type: None (Blanchable reddened areas)       Current Nutrition Intake & Therapies:    Average Meal Intake: NPO  Average Supplements Intake: NPO  Current Tube Feeding (TF) Orders:  Feeding Route: PEG  Formula: Standard with Fiber  Schedule: Bolus  Feeding Regimen: 250 ml bolus x 5/d= 1250

## 2024-07-11 NOTE — CARE COORDINATION
Internal Medicine On-call Care Coordination Note    I was called by the ED physician because they recommended admission for this patient and we cover their PCP.  The history as I understand it after discussion with the ED physician is as follows:    Sent in by PCP for fever  Family reports confusion at home  T 101 in ED  Urine looks clean  CXR- pneumonia  Coughing up white sputum    I placed admission orders.  Including:    IV rocephin and doxy  Mucinex     Dr. Whatley, or our coverage will see the patient tomorrow for H&P.    Electronically signed by CARLOS A Barfield CNP on 7/10/2024 at 8:35 PM

## 2024-07-11 NOTE — ED NOTES
ED to Inpatient Handoff Report    Notified Floor rn Charla  that electronic handoff available and patient ready for transport to room 0410.    Safety Risks: None identified    Patient in Restraints: no    Constant Observer or Patient : no    Telemetry Monitoring Ordered: Yes     Cardiac Rhythm: Sinus rhythm    Order to transfer to unit without monitor: NA    Last MEWS: 1 Time completed: 2105    Deterioration Index: 37.09    Vitals:    07/10/24 1753 07/10/24 1800 07/10/24 1930 07/10/24 2105   BP:  (!) 124/50 (!) 97/56 (!) 116/50   Pulse: (!) 171 79 74 80   Resp: 26 22 21 16   Temp:    99.4 °F (37.4 °C)   TempSrc:    Oral   SpO2: 97% 93%  94%   Weight:       Height:           Opportunity for questions and clarification was provided.

## 2024-07-11 NOTE — PLAN OF CARE
Problem: Discharge Planning  Goal: Discharge to home or other facility with appropriate resources  7/11/2024 1016 by Shayy Rodriguez RN  Outcome: Progressing  7/10/2024 2239 by Shayy Rodriguez RN  Outcome: Not Progressing     Problem: Safety - Adult  Goal: Free from fall injury  7/11/2024 1016 by Shayy Rodriguez RN  Outcome: Progressing  7/10/2024 2239 by Shayy Rodriguez RN  Outcome: Not Progressing     Problem: ABCDS Injury Assessment  Goal: Absence of physical injury  7/11/2024 1016 by Shayy Rodriguez RN  Outcome: Progressing  7/10/2024 2239 by Shayy Rodriguez RN  Outcome: Not Progressing

## 2024-07-11 NOTE — CARE COORDINATION
Social Work/Discharge Planning:  Met with patient and completed initial assessment.  Explained Social Work role and discussed transition of care/discharge planning.  Patient lives alone in a one story condo.  PTA he uses a upright walker.  He has a cane, wheeled walker and a shower chair.  He believes his tube feeding is supplied through [facility].  He was discharged about three weeks ago from [facility].  He states he is active with [facility].  He states plan is home and he is not interested in returning to a skilled nursing facility at discharge.  Informed patient therapy to evaluate.  Called liaison Yanna with [facility] and confirmed patient is active for skilled nursing and physical therapy.  Patient will need a resume home care order.  Will continue to follow and assist if needed.  Electronically signed by DARLIN Cruz on 7/11/2024 at 1:34 PM

## 2024-07-11 NOTE — PLAN OF CARE
Problem: Discharge Planning  Goal: Discharge to home or other facility with appropriate resources  Outcome: Not Progressing     Problem: Safety - Adult  Goal: Free from fall injury  Outcome: Not Progressing     Problem: ABCDS Injury Assessment  Goal: Absence of physical injury  Outcome: Not Progressing

## 2024-07-12 LAB
ALBUMIN SERPL-MCNC: 2.8 G/DL (ref 3.5–5.2)
ALP SERPL-CCNC: 91 U/L (ref 40–129)
ALT SERPL-CCNC: 20 U/L (ref 0–40)
ANION GAP SERPL CALCULATED.3IONS-SCNC: 8 MMOL/L (ref 7–16)
AST SERPL-CCNC: 28 U/L (ref 0–39)
BASOPHILS # BLD: 0.02 K/UL (ref 0–0.2)
BASOPHILS NFR BLD: 1 % (ref 0–2)
BILIRUB SERPL-MCNC: 0.2 MG/DL (ref 0–1.2)
BUN SERPL-MCNC: 28 MG/DL (ref 6–23)
CALCIUM SERPL-MCNC: 8.5 MG/DL (ref 8.6–10.2)
CHLORIDE SERPL-SCNC: 107 MMOL/L (ref 98–107)
CO2 SERPL-SCNC: 24 MMOL/L (ref 22–29)
CREAT SERPL-MCNC: 0.5 MG/DL (ref 0.7–1.2)
EOSINOPHIL # BLD: 0.13 K/UL (ref 0.05–0.5)
EOSINOPHILS RELATIVE PERCENT: 3 % (ref 0–6)
ERYTHROCYTE [DISTWIDTH] IN BLOOD BY AUTOMATED COUNT: 14.8 % (ref 11.5–15)
GFR, ESTIMATED: >90 ML/MIN/1.73M2
GLUCOSE SERPL-MCNC: 117 MG/DL (ref 74–99)
HCT VFR BLD AUTO: 30.5 % (ref 37–54)
HGB BLD-MCNC: 9.8 G/DL (ref 12.5–16.5)
IMM GRANULOCYTES # BLD AUTO: <0.03 K/UL (ref 0–0.58)
IMM GRANULOCYTES NFR BLD: 1 % (ref 0–5)
LYMPHOCYTES NFR BLD: 1.07 K/UL (ref 1.5–4)
LYMPHOCYTES RELATIVE PERCENT: 28 % (ref 20–42)
MCH RBC QN AUTO: 31.5 PG (ref 26–35)
MCHC RBC AUTO-ENTMCNC: 32.1 G/DL (ref 32–34.5)
MCV RBC AUTO: 98.1 FL (ref 80–99.9)
MICROORGANISM SPEC CULT: ABNORMAL
MONOCYTES NFR BLD: 0.39 K/UL (ref 0.1–0.95)
MONOCYTES NFR BLD: 10 % (ref 2–12)
NEUTROPHILS NFR BLD: 57 % (ref 43–80)
NEUTS SEG NFR BLD: 2.15 K/UL (ref 1.8–7.3)
PLATELET, FLUORESCENCE: 121 K/UL (ref 130–450)
PMV BLD AUTO: 10.5 FL (ref 7–12)
POTASSIUM SERPL-SCNC: 4 MMOL/L (ref 3.5–5)
PROT SERPL-MCNC: 5.6 G/DL (ref 6.4–8.3)
RBC # BLD AUTO: 3.11 M/UL (ref 3.8–5.8)
SERVICE CMNT-IMP: ABNORMAL
SODIUM SERPL-SCNC: 139 MMOL/L (ref 132–146)
SPECIMEN DESCRIPTION: ABNORMAL
WBC OTHER # BLD: 3.8 K/UL (ref 4.5–11.5)

## 2024-07-12 PROCEDURE — 97530 THERAPEUTIC ACTIVITIES: CPT

## 2024-07-12 PROCEDURE — 2500000003 HC RX 250 WO HCPCS: Performed by: NURSE PRACTITIONER

## 2024-07-12 PROCEDURE — 6370000000 HC RX 637 (ALT 250 FOR IP): Performed by: NURSE PRACTITIONER

## 2024-07-12 PROCEDURE — 80053 COMPREHEN METABOLIC PANEL: CPT

## 2024-07-12 PROCEDURE — 6360000002 HC RX W HCPCS: Performed by: NURSE PRACTITIONER

## 2024-07-12 PROCEDURE — 2580000003 HC RX 258: Performed by: NURSE PRACTITIONER

## 2024-07-12 PROCEDURE — 85025 COMPLETE CBC W/AUTO DIFF WBC: CPT

## 2024-07-12 PROCEDURE — 2060000000 HC ICU INTERMEDIATE R&B

## 2024-07-12 RX ADMIN — Medication 6 MG: at 20:59

## 2024-07-12 RX ADMIN — PETROLATUM: 420 OINTMENT TOPICAL at 08:51

## 2024-07-12 RX ADMIN — DOXYCYCLINE 100 MG: 100 INJECTION, POWDER, LYOPHILIZED, FOR SOLUTION INTRAVENOUS at 21:11

## 2024-07-12 RX ADMIN — WATER 2000 MG: 1 INJECTION INTRAMUSCULAR; INTRAVENOUS; SUBCUTANEOUS at 20:55

## 2024-07-12 RX ADMIN — ENOXAPARIN SODIUM 40 MG: 100 INJECTION SUBCUTANEOUS at 08:50

## 2024-07-12 RX ADMIN — GUAIFENESIN 400 MG: 100 LIQUID ORAL at 20:59

## 2024-07-12 RX ADMIN — GUAIFENESIN 400 MG: 100 LIQUID ORAL at 08:44

## 2024-07-12 RX ADMIN — SODIUM CHLORIDE, PRESERVATIVE FREE 10 ML: 5 INJECTION INTRAVENOUS at 20:55

## 2024-07-12 RX ADMIN — DOXYCYCLINE 100 MG: 100 INJECTION, POWDER, LYOPHILIZED, FOR SOLUTION INTRAVENOUS at 08:36

## 2024-07-12 RX ADMIN — FAMOTIDINE 20 MG: 20 TABLET ORAL at 20:59

## 2024-07-12 RX ADMIN — DOXAZOSIN 2 MG: 2 TABLET ORAL at 20:59

## 2024-07-12 RX ADMIN — LEVOTHYROXINE SODIUM 100 MCG: 100 TABLET ORAL at 05:56

## 2024-07-12 RX ADMIN — ACETAMINOPHEN 650 MG: 650 SOLUTION ORAL at 03:51

## 2024-07-12 RX ADMIN — ACETAMINOPHEN 650 MG: 650 SOLUTION ORAL at 21:57

## 2024-07-12 RX ADMIN — FAMOTIDINE 20 MG: 20 TABLET ORAL at 08:47

## 2024-07-12 RX ADMIN — PETROLATUM: 420 OINTMENT TOPICAL at 21:01

## 2024-07-12 RX ADMIN — GUAIFENESIN 400 MG: 100 LIQUID ORAL at 14:54

## 2024-07-12 RX ADMIN — ATORVASTATIN CALCIUM 40 MG: 40 TABLET, FILM COATED ORAL at 08:46

## 2024-07-12 RX ADMIN — ASPIRIN 81 MG CHEWABLE TABLET 81 MG: 81 TABLET CHEWABLE at 20:59

## 2024-07-12 RX ADMIN — SODIUM CHLORIDE, PRESERVATIVE FREE 10 ML: 5 INJECTION INTRAVENOUS at 08:50

## 2024-07-12 ASSESSMENT — PAIN DESCRIPTION - LOCATION
LOCATION: RIB CAGE
LOCATION: BACK

## 2024-07-12 ASSESSMENT — PAIN - FUNCTIONAL ASSESSMENT
PAIN_FUNCTIONAL_ASSESSMENT: ACTIVITIES ARE NOT PREVENTED
PAIN_FUNCTIONAL_ASSESSMENT: ACTIVITIES ARE NOT PREVENTED

## 2024-07-12 ASSESSMENT — PAIN SCALES - GENERAL
PAINLEVEL_OUTOF10: 8
PAINLEVEL_OUTOF10: 6
PAINLEVEL_OUTOF10: 3
PAINLEVEL_OUTOF10: 0

## 2024-07-12 ASSESSMENT — PAIN DESCRIPTION - DESCRIPTORS
DESCRIPTORS: ACHING
DESCRIPTORS: ACHING

## 2024-07-12 ASSESSMENT — PAIN DESCRIPTION - ORIENTATION
ORIENTATION: LEFT
ORIENTATION: MID

## 2024-07-12 ASSESSMENT — PAIN DESCRIPTION - PAIN TYPE: TYPE: ACUTE PAIN

## 2024-07-12 NOTE — CARE COORDINATION
Social Work:    Confirmed with daughter Alyssa plans to return with [facility] when discharge. Social work prompted for [facility]ELSON orders today.    Electronically signed by DARLIN Moe on 7/12/2024 at 1:46 PM

## 2024-07-12 NOTE — PLAN OF CARE
Problem: Discharge Planning  Goal: Discharge to home or other facility with appropriate resources  7/11/2024 2243 by Ayana Bolivar RN  Outcome: Progressing     Problem: Safety - Adult  Goal: Free from fall injury  7/11/2024 2243 by Ayana Bolivar RN  Outcome: Progressing     Problem: ABCDS Injury Assessment  Goal: Absence of physical injury  7/11/2024 2243 by Ayana Bolivar RN  Outcome: Progressing

## 2024-07-13 LAB
ALBUMIN SERPL-MCNC: 2.7 G/DL (ref 3.5–5.2)
ALP SERPL-CCNC: 90 U/L (ref 40–129)
ALT SERPL-CCNC: 20 U/L (ref 0–40)
ANION GAP SERPL CALCULATED.3IONS-SCNC: 7 MMOL/L (ref 7–16)
AST SERPL-CCNC: 26 U/L (ref 0–39)
BASOPHILS # BLD: 0.01 K/UL (ref 0–0.2)
BASOPHILS NFR BLD: 0 % (ref 0–2)
BILIRUB SERPL-MCNC: 0.2 MG/DL (ref 0–1.2)
BUN SERPL-MCNC: 23 MG/DL (ref 6–23)
CALCIUM SERPL-MCNC: 8.3 MG/DL (ref 8.6–10.2)
CHLORIDE SERPL-SCNC: 104 MMOL/L (ref 98–107)
CO2 SERPL-SCNC: 26 MMOL/L (ref 22–29)
CREAT SERPL-MCNC: 0.5 MG/DL (ref 0.7–1.2)
EOSINOPHIL # BLD: 0.13 K/UL (ref 0.05–0.5)
EOSINOPHILS RELATIVE PERCENT: 4 % (ref 0–6)
ERYTHROCYTE [DISTWIDTH] IN BLOOD BY AUTOMATED COUNT: 14.6 % (ref 11.5–15)
GFR, ESTIMATED: >90 ML/MIN/1.73M2
GLUCOSE SERPL-MCNC: 134 MG/DL (ref 74–99)
HCT VFR BLD AUTO: 31.4 % (ref 37–54)
HGB BLD-MCNC: 10.1 G/DL (ref 12.5–16.5)
IMM GRANULOCYTES # BLD AUTO: <0.03 K/UL (ref 0–0.58)
IMM GRANULOCYTES NFR BLD: 0 % (ref 0–5)
LYMPHOCYTES NFR BLD: 1.23 K/UL (ref 1.5–4)
LYMPHOCYTES RELATIVE PERCENT: 37 % (ref 20–42)
MCH RBC QN AUTO: 31.1 PG (ref 26–35)
MCHC RBC AUTO-ENTMCNC: 32.2 G/DL (ref 32–34.5)
MCV RBC AUTO: 96.6 FL (ref 80–99.9)
MONOCYTES NFR BLD: 0.4 K/UL (ref 0.1–0.95)
MONOCYTES NFR BLD: 12 % (ref 2–12)
NEUTROPHILS NFR BLD: 47 % (ref 43–80)
NEUTS SEG NFR BLD: 1.57 K/UL (ref 1.8–7.3)
PLATELET, FLUORESCENCE: 128 K/UL (ref 130–450)
PMV BLD AUTO: 10.3 FL (ref 7–12)
POTASSIUM SERPL-SCNC: 3.8 MMOL/L (ref 3.5–5)
PROT SERPL-MCNC: 5.6 G/DL (ref 6.4–8.3)
RBC # BLD AUTO: 3.25 M/UL (ref 3.8–5.8)
SODIUM SERPL-SCNC: 137 MMOL/L (ref 132–146)
WBC OTHER # BLD: 3.3 K/UL (ref 4.5–11.5)

## 2024-07-13 PROCEDURE — 80053 COMPREHEN METABOLIC PANEL: CPT

## 2024-07-13 PROCEDURE — 2580000003 HC RX 258: Performed by: NURSE PRACTITIONER

## 2024-07-13 PROCEDURE — 6370000000 HC RX 637 (ALT 250 FOR IP): Performed by: NURSE PRACTITIONER

## 2024-07-13 PROCEDURE — 2060000000 HC ICU INTERMEDIATE R&B

## 2024-07-13 PROCEDURE — 85025 COMPLETE CBC W/AUTO DIFF WBC: CPT

## 2024-07-13 PROCEDURE — 6360000002 HC RX W HCPCS: Performed by: NURSE PRACTITIONER

## 2024-07-13 PROCEDURE — 2500000003 HC RX 250 WO HCPCS: Performed by: NURSE PRACTITIONER

## 2024-07-13 RX ADMIN — DOXYCYCLINE 100 MG: 100 INJECTION, POWDER, LYOPHILIZED, FOR SOLUTION INTRAVENOUS at 08:21

## 2024-07-13 RX ADMIN — FAMOTIDINE 20 MG: 20 TABLET ORAL at 21:43

## 2024-07-13 RX ADMIN — ATORVASTATIN CALCIUM 40 MG: 40 TABLET, FILM COATED ORAL at 08:24

## 2024-07-13 RX ADMIN — GUAIFENESIN 400 MG: 100 LIQUID ORAL at 14:18

## 2024-07-13 RX ADMIN — SODIUM CHLORIDE, PRESERVATIVE FREE 10 ML: 5 INJECTION INTRAVENOUS at 08:23

## 2024-07-13 RX ADMIN — FAMOTIDINE 20 MG: 20 TABLET ORAL at 08:24

## 2024-07-13 RX ADMIN — GUAIFENESIN 400 MG: 100 LIQUID ORAL at 08:24

## 2024-07-13 RX ADMIN — WATER 2000 MG: 1 INJECTION INTRAMUSCULAR; INTRAVENOUS; SUBCUTANEOUS at 21:44

## 2024-07-13 RX ADMIN — DOXAZOSIN 2 MG: 2 TABLET ORAL at 21:43

## 2024-07-13 RX ADMIN — SODIUM CHLORIDE, PRESERVATIVE FREE 10 ML: 5 INJECTION INTRAVENOUS at 21:37

## 2024-07-13 RX ADMIN — ASPIRIN 81 MG CHEWABLE TABLET 81 MG: 81 TABLET CHEWABLE at 21:43

## 2024-07-13 RX ADMIN — PETROLATUM: 420 OINTMENT TOPICAL at 21:43

## 2024-07-13 RX ADMIN — Medication 6 MG: at 21:43

## 2024-07-13 RX ADMIN — ENOXAPARIN SODIUM 40 MG: 100 INJECTION SUBCUTANEOUS at 08:24

## 2024-07-13 RX ADMIN — PETROLATUM: 420 OINTMENT TOPICAL at 08:25

## 2024-07-13 RX ADMIN — DOXYCYCLINE 100 MG: 100 INJECTION, POWDER, LYOPHILIZED, FOR SOLUTION INTRAVENOUS at 21:48

## 2024-07-13 RX ADMIN — LEVOTHYROXINE SODIUM 100 MCG: 100 TABLET ORAL at 08:24

## 2024-07-13 RX ADMIN — GUAIFENESIN 400 MG: 100 LIQUID ORAL at 21:43

## 2024-07-13 NOTE — PLAN OF CARE
Problem: Discharge Planning  Goal: Discharge to home or other facility with appropriate resources  Outcome: Progressing     Problem: Safety - Adult  Goal: Free from fall injury  Outcome: Progressing     Problem: ABCDS Injury Assessment  Goal: Absence of physical injury  Outcome: Progressing     Problem: Nutrition Deficit:  Goal: Optimize nutritional status  Outcome: Progressing     Problem: Skin/Tissue Integrity  Goal: Absence of new skin breakdown  Description: 1.  Monitor for areas of redness and/or skin breakdown  2.  Assess vascular access sites hourly  3.  Every 4-6 hours minimum:  Change oxygen saturation probe site  4.  Every 4-6 hours:  If on nasal continuous positive airway pressure, respiratory therapy assess nares and determine need for appliance change or resting period.  Outcome: Progressing     Problem: Pain  Goal: Verbalizes/displays adequate comfort level or baseline comfort level  Outcome: Progressing

## 2024-07-14 VITALS
BODY MASS INDEX: 18.89 KG/M2 | DIASTOLIC BLOOD PRESSURE: 50 MMHG | HEART RATE: 57 BPM | SYSTOLIC BLOOD PRESSURE: 95 MMHG | TEMPERATURE: 97.1 F | RESPIRATION RATE: 18 BRPM | OXYGEN SATURATION: 96 % | WEIGHT: 134.92 LBS | HEIGHT: 71 IN

## 2024-07-14 LAB
ALBUMIN SERPL-MCNC: 2.9 G/DL (ref 3.5–5.2)
ALP SERPL-CCNC: 96 U/L (ref 40–129)
ALT SERPL-CCNC: 20 U/L (ref 0–40)
ANION GAP SERPL CALCULATED.3IONS-SCNC: 7 MMOL/L (ref 7–16)
AST SERPL-CCNC: 26 U/L (ref 0–39)
BASOPHILS # BLD: 0.01 K/UL (ref 0–0.2)
BASOPHILS NFR BLD: 0 % (ref 0–2)
BILIRUB SERPL-MCNC: 0.3 MG/DL (ref 0–1.2)
BUN SERPL-MCNC: 18 MG/DL (ref 6–23)
CALCIUM SERPL-MCNC: 8.7 MG/DL (ref 8.6–10.2)
CHLORIDE SERPL-SCNC: 103 MMOL/L (ref 98–107)
CO2 SERPL-SCNC: 26 MMOL/L (ref 22–29)
CREAT SERPL-MCNC: 0.5 MG/DL (ref 0.7–1.2)
EOSINOPHIL # BLD: 0.12 K/UL (ref 0.05–0.5)
EOSINOPHILS RELATIVE PERCENT: 3 % (ref 0–6)
ERYTHROCYTE [DISTWIDTH] IN BLOOD BY AUTOMATED COUNT: 14.3 % (ref 11.5–15)
GFR, ESTIMATED: >90 ML/MIN/1.73M2
GLUCOSE SERPL-MCNC: 83 MG/DL (ref 74–99)
HCT VFR BLD AUTO: 33.3 % (ref 37–54)
HGB BLD-MCNC: 10.8 G/DL (ref 12.5–16.5)
IMM GRANULOCYTES # BLD AUTO: <0.03 K/UL (ref 0–0.58)
IMM GRANULOCYTES NFR BLD: 0 % (ref 0–5)
LYMPHOCYTES NFR BLD: 1.5 K/UL (ref 1.5–4)
LYMPHOCYTES RELATIVE PERCENT: 42 % (ref 20–42)
MCH RBC QN AUTO: 31.3 PG (ref 26–35)
MCHC RBC AUTO-ENTMCNC: 32.4 G/DL (ref 32–34.5)
MCV RBC AUTO: 96.5 FL (ref 80–99.9)
MONOCYTES NFR BLD: 0.39 K/UL (ref 0.1–0.95)
MONOCYTES NFR BLD: 11 % (ref 2–12)
NEUTROPHILS NFR BLD: 44 % (ref 43–80)
NEUTS SEG NFR BLD: 1.58 K/UL (ref 1.8–7.3)
PLATELET, FLUORESCENCE: 142 K/UL (ref 130–450)
PMV BLD AUTO: 10.2 FL (ref 7–12)
POTASSIUM SERPL-SCNC: 3.9 MMOL/L (ref 3.5–5)
PROT SERPL-MCNC: 5.9 G/DL (ref 6.4–8.3)
RBC # BLD AUTO: 3.45 M/UL (ref 3.8–5.8)
SODIUM SERPL-SCNC: 136 MMOL/L (ref 132–146)
WBC OTHER # BLD: 3.6 K/UL (ref 4.5–11.5)

## 2024-07-14 PROCEDURE — 80053 COMPREHEN METABOLIC PANEL: CPT

## 2024-07-14 PROCEDURE — 2500000003 HC RX 250 WO HCPCS: Performed by: NURSE PRACTITIONER

## 2024-07-14 PROCEDURE — 6370000000 HC RX 637 (ALT 250 FOR IP): Performed by: NURSE PRACTITIONER

## 2024-07-14 PROCEDURE — 6360000002 HC RX W HCPCS: Performed by: NURSE PRACTITIONER

## 2024-07-14 PROCEDURE — 85025 COMPLETE CBC W/AUTO DIFF WBC: CPT

## 2024-07-14 PROCEDURE — 2580000003 HC RX 258: Performed by: NURSE PRACTITIONER

## 2024-07-14 RX ORDER — DOXYCYCLINE HYCLATE 100 MG
100 TABLET ORAL 2 TIMES DAILY
Qty: 10 TABLET | Refills: 0 | Status: SHIPPED | OUTPATIENT
Start: 2024-07-14 | End: 2024-07-19

## 2024-07-14 RX ORDER — CEFDINIR 300 MG/1
300 CAPSULE ORAL 2 TIMES DAILY
Qty: 10 CAPSULE | Refills: 0 | Status: SHIPPED | OUTPATIENT
Start: 2024-07-14 | End: 2024-07-19

## 2024-07-14 RX ADMIN — SODIUM CHLORIDE, PRESERVATIVE FREE 10 ML: 5 INJECTION INTRAVENOUS at 10:21

## 2024-07-14 RX ADMIN — GUAIFENESIN 400 MG: 100 LIQUID ORAL at 07:27

## 2024-07-14 RX ADMIN — DOXYCYCLINE 100 MG: 100 INJECTION, POWDER, LYOPHILIZED, FOR SOLUTION INTRAVENOUS at 07:42

## 2024-07-14 RX ADMIN — PETROLATUM: 420 OINTMENT TOPICAL at 07:28

## 2024-07-14 RX ADMIN — LEVOTHYROXINE SODIUM 100 MCG: 100 TABLET ORAL at 06:06

## 2024-07-14 RX ADMIN — FAMOTIDINE 20 MG: 20 TABLET ORAL at 07:27

## 2024-07-14 RX ADMIN — ENOXAPARIN SODIUM 40 MG: 100 INJECTION SUBCUTANEOUS at 07:27

## 2024-07-14 RX ADMIN — ATORVASTATIN CALCIUM 40 MG: 40 TABLET, FILM COATED ORAL at 07:27

## 2024-07-14 NOTE — PROGRESS NOTES
4 Eyes Skin Assessment     NAME:  Sandip Abdi  YOB: 1931  MEDICAL RECORD NUMBER:  [id number]    The patient is being assessed for  Admission    I agree that at least one RN has performed a thorough Head to Toe Skin Assessment on the patient. ALL assessment sites listed below have been assessed.      Areas assessed by both nurses:    Head, Face, Ears, Shoulders, Back, Chest, Arms, Elbows, Hands, Sacrum. Buttock, Coccyx, Ischium, and Legs. Feet and Heels        Does the Patient have a Wound? No noted wound(s)       Christopher Prevention initiated by RN: No  Wound Care Orders initiated by RN: No    Pressure Injury (Stage 3,4, Unstageable, DTI, NWPT, and Complex wounds) if present, place Wound referral order by RN under : No    New Ostomies, if present place, Ostomy referral order under : No     Nurse 1 eSignature: Electronically signed by Shayy Rodriguez RN on 7/10/24 at 10:33 PM EDT    **SHARE this note so that the co-signing nurse can place an eSignature**    Nurse 2 eSignature: Electronically signed by Ruthy Tran RN on 7/10/24 at 10:39 PM EDT   
Call patient daughter Alyssa back- updated on patient- all questions answered.   
Call placed to [facility] to notify of discharge and NELSON order.  Patients daughter Alyssa aware of discharge.   
Internal Medicine Progress Note    Patient's name: Sandip Abdi  : 1931  Chief complaints (on day of admission): Fatigue (Increased weakness over the past 24 hours, dysuria, dx with uti by pcp), Fever, and Urinary Tract Infection  Admission date: 7/10/2024  Date of service: 2024   Room: [place] [address]  Primary care physician: Gary Cleveland DO  Reason for visit: Follow-up for pneumonia    Subjective  Sandip was seen and examined.  Patient is feeling better today.  Having some diarrhea today.  Patient's daughter is present at bedside.  States he is more clear and acting like his normal self.  Tolerating tube feeding.  No other nausea, vomiting, headache.  Patient states he consistently weighs 128 pounds at home.  Scales here states that he weighs 117 pounds to 143 pounds.  Family confirms his numbers.      Review of Systems  There are no new complaints of chest pain, shortness of breath, abdominal pain, nausea, vomiting,  constipation.    Hospital Medications  Current Facility-Administered Medications   Medication Dose Route Frequency Provider Last Rate Last Admin    white petrolatum ointment   Topical BID Maida Lind APRN - CNP   Given at 24 0851    glycerin moisturizing mouth spray (OASIS) 35 % 2 spray  2 spray Mouth/Throat PRN Maida Lind APRN - CNP   2 spray at 24 1037    sodium chloride flush 0.9 % injection 10 mL  10 mL IntraVENous PRN Aquilino Aparicio DO        doxazosin (CARDURA) tablet 2 mg  2 mg PEG Tube Nightly Maida Lind APRN - CNP   2 mg at 24    cefTRIAXone (ROCEPHIN) 2,000 mg in sterile water 20 mL IV syringe  2,000 mg IntraVENous Q24H Maida Lind APRN - CNP   2,000 mg at 24    doxycycline (VIBRAMYCIN) 100 mg in sodium chloride 0.9 % 100 mL IVPB  100 mg IntraVENous Q12H Maida Lind APRN - CNP   Stopped at 24 0936    sodium chloride flush 0.9 % injection 10 mL  10 mL IntraVENous 2 times per day 
Notified NP Maida Lind of pt's BP. New orders received.   
OCCUPATIONAL THERAPY INITIAL EVALUATION    [facility]   [address] [facility] [address]        Date:2024                                                  Patient Name: Sandip Abdi    MRN: [id number]    : 1931    Room: [address] [place] [address]    Evaluating OT: Marizol Merritt OTR/L #[id number]    Referring Provider:  Lico Kowalski MD     Specific Provider Orders/Date:  OT Eval and Treat , 2024     Diagnosis:   1. Febrile illness    2. Intermittent confusion    3. SIRS (systemic inflammatory response syndrome) (HCC)         Surgery: None       Pertinent Medical History: [place], HTN, Raynaud's disease, Lea's esophagus, thyroid disease, PEG     Precautions:  Fall Risk, alarm     Assessment of current deficits    [x] Functional mobility  [x]ADLs  [x] Strength               []Cognition    [x] Functional transfers   [x] IADLs         [x] Safety Awareness   [x]Endurance    [] Fine Coordination              [x] Balance      [] Vision/perception   []Sensation     []Gross Motor Coordination  [] ROM  [] Delirium                   [] Motor Control     OT PLAN OF CARE   OT POC based on physician orders, patient diagnosis and results of clinical assessment    Frequency/Duration 2-5 days/wk for 2-4 weeks PRN     Specific OT Treatment Interventions to include:   * Instruction/training on adapted ADL techniques and AE recommendations to increase functional independence within precautions       * Training on energy conservation strategies, correct breathing pattern and techniques to improve independence/tolerance for self-care routine  * Functional transfer/mobility training/DME recommendations for increased independence, safety, and fall prevention  * Patient/Family education to increase follow through with safety techniques and functional independence  * Recommendation of environmental modifications for increased safety with functional transfers/mobility and ADLs  * 
Physical Therapy  Facility/Department: [address] INTERNAL MEDICINE 2  Physical Therapy Initial Assessment    Name: Sandip Abdi  : 1931  MRN: [id number]  Date of Service: 2024        Patient Diagnosis(es): The primary encounter diagnosis was Febrile illness. Diagnoses of Intermittent confusion and SIRS (systemic inflammatory response syndrome) (HCC) were also pertinent to this visit.  Past Medical History:  has a past medical history of Lea's esophagus, Carotid atherosclerosis, History of stress test, [place] (hard of hearing), Hypertension, Raynaud's disease, and Thyroid disease.  Past Surgical History:  has a past surgical history that includes Hemorrhoid surgery; eye surgery (Bilateral); Endoscopy, colon, diagnostic; Colonoscopy; Tonsillectomy; Finger amputation; hernia repair (2018); and Upper gastrointestinal endoscopy (N/A, 2019).      Evaluating Therapist: Sophy Bethea PT    Room #:  0410/0410-A  Diagnosis:  Pneumonia due to organism [J18.9]  PMHx/PSHx:  Barett's esophagus, HTn, Raynaud's  Precautions:  falls      Social:  Pt lives alone in a 1 floor plan no steps to enter Has caregiver 4 days a week for 3 hours. Family is supporting. Has been planning to transition to [facility]    Prior to admission independent ambulation with ww or upright walker     Initial Evaluation  Date: 24 Treatment      Short Term/ Long Term   Goals   Was pt agreeable to Eval/treatment? yes     Does pt have pain? No c/o pain     Bed Mobility  Rolling: NT  Supine to sit: NT  Sit to supine: NT  Scooting: NT  independent   Transfers Sit to stand: SBA  Stand to sit: SBA  Stand pivot: SBA  independent   Ambulation    140 feet with ww with SBA  200 feet with ww iindependent   Stair Negotiation  Ascended and descended  NT   N/A   LE strength     3+/5    4/5   balance      Fair+     AM-PAC Raw score                        Pt is alert and Oriented   LE ROM: WFL  Sensation: intact  Edema: none  Endurance: fair+   
Therapeutic activities to facilitate/challenge dynamic balance, stand tolerance for increased safety and independence with ADLs  * Positioning to improve skin integrity, interaction with environment and functional independence     Recommended Adaptive Equipment: TBD       Home Living: Lives alone, Condo, 1 story, No steps to enter. Family has plans to transition patient to [facility] in near future.   Bathroom set-up: Walk-in shower with grab bar          Equipment owned: Wheeled walker, shower chair      Prior Level of Function: Pt has a caregiver 4 days a week for 3 hours/day to assist with ADLs and IADLs. Ambulated with fww or upright walker.      Pain Level: None reported          Cognition: A&O: 4/4; Follows 3 step directions              Memory: fair+              Sequencing: fair+              Problem solving: fair+              Judgement/safety: fair/fair+                Functional Assessment: AM-PAC Daily Activity Raw Score: 17/24    Initial Eval Status  Date: 7/11/24    Treatment Status  Date: 7/12/24 STGs = LTGs  Time frame: 10-14 days   Feeding PEG           Grooming Stand by Assist       Modified [place]    UB Dressing Minimal Assist    SBA  Modified [place]    LB Dressing Minimal Assist    SBA to don socks, assist with brief in bed d/t bowel incontinence  Modified [place]    Bathing Moderate Assist       Modified [place]    Toileting Minimal Assist      incontinent of BM (patient states he was given laxative prior) Modified [place]    Bed Mobility  Supine to sit:  N/A  Sit to supine:  N/A    Supine to sit SBA  Supine to sit: Independent   Sit to supine: Independent    Functional Transfers Sit to stand: Stand by Assist   Stand to sit: Stand by Assist      Transfer training with verbal cues for hand placement to improve safety.     Sit <> stand SBA  Independent    Functional Mobility Stand by Assist with fww to improve balance greater than  household distances, verbal cues for walker 
   atorvastatin (LIPITOR) tablet 40 mg  40 mg PEG Tube QAM Maida Lind, APRN - CNP   40 mg at 07/13/24 0824       PRN Medications  glycerin moisturizing mouth spray, sodium chloride flush, sodium chloride flush, sodium chloride, magnesium sulfate, acetaminophen **OR** acetaminophen, ondansetron **OR** ondansetron, potassium chloride **OR** potassium alternative oral replacement **OR** potassium chloride, sennosides    Objective  Most Recent Recorded Vitals  /63   Pulse 61   Temp 98.2 °F (36.8 °C)   Resp 16   Ht 1.803 m (5' 11\")   Wt 61.2 kg (134 lb 14.7 oz)   SpO2 94%   BMI 18.82 kg/m²   I/O last 3 completed shifts:  In: 3972 [I.V.:10; NG/GT:3962]  Out: 2600 [Urine:2600]  I/O this shift:  In: 100 [NG/GT:100]  Out: -     Physical Exam:  General: AAO to person/place/time/purpose, NAD, no labored breathing. Appears weak overall.  Eyes: conjunctivae/corneas clear, sclera non icteric  Skin: color/texture/turgor normal, no rashes or lesions  Lungs: CTAB, no retractions/use of accessory muscles, no vocal fremitus, no rhonchi, no crackle, no rales  Heart: regular rate, regular rhythm, no murmur  Abdomen: soft, NT, bowel sounds normal, PEG tube in place clean/dry/intact  Extremities: atraumatic, no edema  Neurologic: cranial nerves 2-12 grossly intact, no slurred speech    Most Recent Labs  Lab Results   Component Value Date    WBC 3.3 (L) 07/13/2024    HGB 10.1 (L) 07/13/2024    HCT 31.4 (L) 07/13/2024     07/10/2024     07/13/2024    K 3.8 07/13/2024     07/13/2024    CREATININE 0.5 (L) 07/13/2024    BUN 23 07/13/2024    CO2 26 07/13/2024    GLUCOSE 134 (H) 07/13/2024    ALT 20 07/13/2024    AST 26 07/13/2024    INR 1.0 03/06/2023    APTT 31.3 03/06/2023    TSH 1.72 06/10/2024       XR CHEST PORTABLE   Final Result   Strandy opacities in the lung bases. Favor largely atelectasis/scar. Some   component of inflammatory disease possible especially on the right.               Assessment 
07/14/2024    HCT 33.3 (L) 07/14/2024     07/10/2024     07/14/2024    K 3.9 07/14/2024     07/14/2024    CREATININE 0.5 (L) 07/14/2024    BUN 18 07/14/2024    CO2 26 07/14/2024    GLUCOSE 83 07/14/2024    ALT 20 07/14/2024    AST 26 07/14/2024    INR 1.0 03/06/2023    APTT 31.3 03/06/2023    TSH 1.72 06/10/2024       XR CHEST PORTABLE   Final Result   Strandy opacities in the lung bases. Favor largely atelectasis/scar. Some   component of inflammatory disease possible especially on the right.               Assessment   Active Hospital Problems    Diagnosis     NSTEMI (non-ST elevated myocardial infarction) (HCC) [I21.4]      Priority: High    Ventricular tachycardia (HCC) [I47.20]      Priority: Medium    Pneumonia due to organism [J18.9]     [place] (hard of hearing) [H91.90]     Lea's esophagus [K22.70]     Severe protein-calorie malnutrition (HCC) [E43]     Carotid atherosclerosis [I65.29]     Hypertension [I10]     Raynaud's disease [I73.00]     Thyroid disease [E07.9]          Plan  Pneumonia  Patient started on IV antibiotics  Continue to monitor  Fever up to 101 at home and here  Continue antibiotics by PEG tube to complete a total of 7 days.  Recommend Doxy 100 mg twice daily to continue versus Levaquin to go down the PEG tube  Diarrhea x 2  Continue to monitor and ensure patient is not becoming dehydrated prior to discharge  Hypertension  Continue home meds  Stable  Severe protein calorie malnutrition  Continue tube feeding as ordered  Adjusted protein amount with bolus feeding per dietitian recommendation  Patient to be weighed standing up   Home weight was 128 pounds.  Coronary artery disease with ventricular tachycardia  Continue meds as ordered  Hard of hearing  Chronic  PT AM-PAC-- 17/24  OT AM-PAC-- 17/24  Follow labs   DVT prophylaxis  Please see orders for further management and care.  Discharge plan: Home with [facility]. Spoke to Alyssa, patients daughter who is agreeable with plan of

## 2024-07-14 NOTE — DISCHARGE SUMMARY
Internal Medicine Discharge Summary    NAME: Sandip Abdi :  1931  MRN:  [id number] PCP:aGry Cleveland DO    ADMITTED: 7/10/2024   DISCHARGED: No discharge date for patient encounter.    ADMITTING PHYSICIAN: Lico Kowalski MD    PCP: Gary Cleveland DO    CONSULTANT(S):   IP CONSULT TO INTERNAL MEDICINE  IP CONSULT TO DIETITIAN  IP CONSULT TO DIETITIAN     ADMITTING DIAGNOSIS:   Pneumonia due to organism [J18.9]     Please see H&P for further details    DISCHARGE DIAGNOSES:   Active Hospital Problems    Diagnosis     NSTEMI (non-ST elevated myocardial infarction) ([facility]) [I21.4]      Priority: High    Ventricular tachycardia (HCC) [I47.20]      Priority: Medium    Pneumonia due to organism [J18.9]     [place]hard [place] [H91.90]     Lea's esophagus [K22.70]     Severe protein-calorie malnutrition (HCC) [E43]     Carotid atherosclerosis [I65.29]     Hypertension [I10]     Raynaud's disease [I73.00]     Thyroid disease [E07.9]        BRIEF HISTORY OF PRESENT ILLNESS: Sandip Abdi is a 89 y.o. male patient of Gary Cleveland DO who  has a past medical history of Lea's esophagus, Carotid atherosclerosis, History of stress test, [place] (hard of hearing), Hypertension, Raynaud's disease, and Thyroid disease. who originally had concerns including Fatigue (Increased weakness over the past 24 hours, dysuria, dx with uti by pcp), Fever, and Urinary Tract Infection. at presentation on 7/10/2024, and was found to have Pneumonia due to organism [J18.9] after workup.    Please see H&P for further details.    HOSPITAL COURSE:   The patient presented to the hospital with the chief complaint of Fatigue (Increased weakness over the past 24 hours, dysuria, dx with uti by pcp), Fever, and Urinary Tract Infection  . The patient was admitted to the hospital.   Plan  Pneumonia  Patient started on IV antibiotics  Continue to monitor  Fever up to 101 at home and here  Continue antibiotics by PEG tube to complete a total

## 2024-10-03 ENCOUNTER — OFFICE VISIT (OUTPATIENT)
Dept: CARDIOLOGY CLINIC | Age: 89
End: 2024-10-03
Payer: MEDICARE

## 2024-10-03 VITALS
BODY MASS INDEX: 18.71 KG/M2 | DIASTOLIC BLOOD PRESSURE: 54 MMHG | HEIGHT: 70 IN | RESPIRATION RATE: 18 BRPM | SYSTOLIC BLOOD PRESSURE: 106 MMHG | WEIGHT: 130.7 LBS | HEART RATE: 69 BPM

## 2024-10-03 DIAGNOSIS — I21.4 NSTEMI (NON-ST ELEVATED MYOCARDIAL INFARCTION) (HCC): Primary | ICD-10-CM

## 2024-10-03 PROCEDURE — 99214 OFFICE O/P EST MOD 30 MIN: CPT | Performed by: INTERNAL MEDICINE

## 2024-10-03 PROCEDURE — 1123F ACP DISCUSS/DSCN MKR DOCD: CPT | Performed by: INTERNAL MEDICINE

## 2024-10-03 PROCEDURE — 93000 ELECTROCARDIOGRAM COMPLETE: CPT | Performed by: INTERNAL MEDICINE

## 2024-10-03 RX ORDER — ACETAMINOPHEN 500 MG
500 TABLET ORAL EVERY 6 HOURS PRN
COMMUNITY

## 2024-10-03 RX ORDER — NITROFURANTOIN 25; 75 MG/1; MG/1
100 CAPSULE ORAL 2 TIMES DAILY
COMMUNITY
Start: 2024-10-02 | End: 2024-10-09

## 2024-10-03 NOTE — PROGRESS NOTES
CHIEF COMPLAINT: Wide Complex Tachycardia/NSVT?/NSVT/Septic shock.    HISTORY OF PRESENT ILLNESS: Patient is a 89 y.o. male seen at the request of Gary Cleveland DO.     Consulted on recent admission for abnormal troponin and wide complex tachycardia suggesting VT.    Some SOB. No CP or angina.       Past Medical History:   Diagnosis Date    Lea's esophagus     Carotid atherosclerosis     History of stress test 05/2018    [place] (hard of hearing)     bilateral aids    Hypertension     Raynaud's disease     Thyroid disease        Patient Active Problem List   Diagnosis    Carotid atherosclerosis    Hypertension    Raynaud's disease    Thyroid disease    Pneumonia due to infectious organism    [place] (hard of hearing)    Lea's esophagus    NSTEMI (non-ST elevated myocardial infarction) (HCC)    Ventricular tachycardia (HCC)    Sepsis (HCC)    Severe protein-calorie malnutrition (HCC)    Pneumonia due to organism       No Known Allergies    Current Outpatient Medications   Medication Sig Dispense Refill    nitrofurantoin, macrocrystal-monohydrate, (MACROBID) 100 MG capsule Take 1 capsule by mouth 2 times daily      acetaminophen (TYLENOL) 500 MG tablet Take 1 tablet by mouth every 6 hours as needed for Pain      levothyroxine (SYNTHROID) 100 MCG tablet Take 1 tablet by mouth Daily (Patient taking differently: 112 mcg by PEG Tube route Daily) 30 tablet 0    atorvastatin (LIPITOR) 40 MG tablet Take 1 tablet by mouth nightly (Patient taking differently: 1 tablet by PEG Tube route daily) 30 tablet 0    doxazosin (CARDURA) 2 MG tablet 1 tablet by PEG Tube route nightly 30 tablet 3    famotidine (PEPCID) 20 MG tablet 1 tablet by PEG Tube route 2 times daily      pramipexole (MIRAPEX) 0.5 MG tablet 1 tablet by PEG Tube route at bedtime Daily at hs      aspirin EC 81 MG EC tablet Take 1 tablet by mouth at bedtime Via peg tube      melatonin 5 MG TABS tablet 1 tablet by Per G Tube route at bedtime Daily at night

## 2024-12-26 NOTE — PROGRESS NOTES
Database initiated. Patient is alert to self. He comes in from [facility]. He uses a walker and is RA at baseline. He is hard of hearing. Grandson at bedside states he has been having diarrhea. He is NPO and has a Peg Tube d/t swallowing issues. There is no TF order listed on the facility paperwork.

## 2024-12-26 NOTE — ED PROVIDER NOTES
[facility] INTERNAL MEDICINE 2  EMERGENCY DEPARTMENT ENCOUNTER        Pt Name: Sandip Abdi  MRN: [id number]  Birthdate 8/5/1931  Date of evaluation: 12/26/2024  Provider: Sarah Gross DO  PCP: Gary Cleveland DO  Note Started: 11:57 AM EST 12/26/24    CHIEF COMPLAINT       Chief Complaint   Patient presents with    Altered Mental Status     Pt from nursing facility, normally alert and oriented and walks 94991 steps per day.  Today weak, not walking around and lethargic, responds to name        HISTORY OF PRESENT ILLNESS: 1 or more Elements   History From: Patient    Limitations to history : None    Sandip Abdi is a 89 y.o. male who presents with concern for altered mental status.  Patient is coming from a nursing facility, facility noted that he is normally alert and oriented, extremely active, walks around the facility daily.  Today he was feeling more fatigued, did not want to get out of bed, was more somnolent than normal.  Had been hypoxic at the facility.  Patient is relatively poor historian at this time but he does admit to having a productive cough.    Family present at bedside helps to provide history, notes that he is definitely altered from his normal mental status, they do not believe that he had fallen, no other associated complaints.      EXTERNAL NOTE REVIEW:      On chart review last admitted to the hospital for pneumonia on 7/10/2024. Last saw cardiology for STEMI on 10/3/2024.  Last echo was 6/11/2024 with ejection fraction of 60 to 65%.    REVIEW OF SYSTEMS :      Positives and Pertinent negatives as per HPI.     SURGICAL HISTORY     Past Surgical History:   Procedure Laterality Date    COLONOSCOPY      ENDOSCOPY, COLON, DIAGNOSTIC      EYE SURGERY Bilateral     cataract removal    FINGER AMPUTATION      partial right finger    HEMORRHOID SURGERY      HERNIA REPAIR  05/20/2018    [facility]    TONSILLECTOMY      UPPER GASTROINTESTINAL ENDOSCOPY N/A 1/16/2019    EGD BIOPSY performed by

## 2024-12-26 NOTE — CARE COORDINATION
Internal Medicine On-call Care Coordination Note     I was called by the ED physician because they recommended admission for this patient and we cover their PCP.  The history as I understand it after discussion with the ED physician is as follows:     Admit for Pneumonia and respiratory failure     I placed admission orders.  Including:     Continue abx as ordered  Wean o2 as tolerated  Continue  tube feeding  Dietitian to adjust     Dr. Whatley or his coverage will see the patient tomorrow for H&P and review of all orders.     Electronically signed by Lico Kowalski MD on 12/26/2024 at 6:05 PM

## 2024-12-26 NOTE — ED NOTES
ED to Inpatient Handoff Report    Notified chasidy that electronic handoff available and patient ready for transport to room 419.    Safety Risks: Hearing problems, Difficulty with daily activities, and Risk of falls    Patient in Restraints: no    Constant Observer or Patient : no    Telemetry Monitoring Ordered :Yes           Order to transfer to unit without monitor:YES    Last MEWS: 1 Time completed: 1754      Deterioration Index Score:   Predictive Model Details          35 (Caution)  Factor Value    Calculated 12/26/2024 18:16 40% Age 89 years old    Deterioration Index Model 32% Supplemental oxygen Nasal cannula     7% Respiratory rate 18     5% Blood pH abnormal (7.515)     5% Pulse oximetry 92 %     3% Potassium 4.2 mmol/L     3% Systolic 135     3% BUN abnormal (31 mg/dL)     1% WBC count abnormal (3.2 k/uL)     1% Pulse 87     0% Sodium 140 mmol/L     0% Temperature 98.8 °F (37.1 °C)     0% Hematocrit 40.1 %        Vitals:    12/26/24 1202 12/26/24 1754   BP: (!) 159/88 135/74   Pulse: 86 87   Resp: 18 18   Temp: 98.9 °F (37.2 °C) 98.8 °F (37.1 °C)   SpO2: 93% 92%   Weight: 59 kg (130 lb)          Opportunity for questions and clarification was provided.

## 2024-12-27 PROBLEM — R09.02 HYPOXIA: Status: ACTIVE | Noted: 2024-01-01

## 2024-12-27 NOTE — H&P
Internal Medicine History & Physical     Name: Sandip Abdi  : 1931  Chief Complaint: Altered Mental Status (Pt from nursing facility, normally alert and oriented and walks 89373 steps per day.  Today weak, not walking around and lethargic, responds to name )  Primary Care Physician: Gary Cleveland DO  Admission date: 2024  Date of service: 2024     History of Present Illness  Sandip is a 89 y.o. year old male with a past medical history of hypertension, hypothyroidism, Lea's esophagus and chronic dysphagia with PEG/tube feeding.  He presented to the ER on  from the assisted living facility with complaints of altered mental status that began earlier that day.  He apparently is alert and oriented at baseline and fairly active at the facility however had noted to be more fatigued that morning, not wanting to get out of bed.  Denies any shortness of breath or difficulty breathing but does report a cough with some sputum production.  Denies any fever or chills.  His oxygenation was checked at the facility and he was noted to be hypoxic at which time he was sent to the ER for further evaluation.  Upon arrival to the ER he was noted to be hemodynamically stable although hypoxic requiring 5L NC to maintain SpO2.  Lab work was obtained and noted WBC 3.2 but was otherwise unremarkable.  CT of the head showed no acute findings.  Chest x-ray noted right upper and lower lobe pneumonia.  Cultures were obtained, he was given nebulizer treatments and antibiotics and admitted for further management.    Currently he is seen lying in bed awake and alert in no distress.  He reports that he does seem to be feeling better although still coughing.  He denies any fever or chills.  Denies any nausea or vomiting.  He admits to eating a \"small nicho cookie\" the other day.  In discussion with nursing he does remain on 4L NC at this time however oxygenation has been stable so will work on weaning.  No other  cookie  Check procalcitonin  Check urine strep/Legionella antigens  Continue IV fluids for now -- likely stop soon once BP stabilizes  Continue Unasyn/Zithromax given concerns for aspiration  Continue bronchodilators and BPH  Encourage IS and increased activity  Continue supplemental oxygen and wean to maintain SpO2 >90% -- currently on 4L NC; baseline is room air  Consider Pulmonary consult if no improvement    Essential Hypertension with Relative Hypotension  Hold Cardura for now given BP still somewhat low  Continue IV fluids  Monitor BP trends and adjust as indicated     Hypothyroidism  Continue Synthroid     GERD  Hx Lea's Esophagus  Continue Pepcid     Neurogenic Dysphagia  S/p PEG  Continue NPO with TF as at home     Normocytic Anemia  Monitor H&H  Transfuse to keep hemoglobin >7.0    Generalized Weakness  PT AM-PAC-- TBD  OT AM-PAC-- TBD   for discharge planning    Continue home medications other than mentioned above.  PT/OT  Follow labs  DVT prophylaxis.  Please see orders for further management and care.   for discharge planning  Discharge plan: SNF vs return to AL pending progress    The pertinent details of this case were discussed with Dr. Whatley.    Kenya Lloyd, APRN - CNP   12/27/2024  9:45 AM    NOTE:  This report was transcribed using voice recognition software.  Every effort was made to ensure accuracy; however, inadvertent computerized transcription errors may be present.    Addendum: I have personally participated in a face-to-face history and physical exam on the date of service with the patient. I have discussed the case with the nurse practitioner. I also participated in medical decision making on the date of service and I agree with all of the pertinent clinical information unless indicated in my editing of the note. I have reviewed and edited the note above based on my findings during my history, exam, and decision making on the same day of service.

## 2024-12-27 NOTE — PROGRESS NOTES
Spiritual Health History and Assessment/Progress Note  [facility]    Crisis, Rapid Response,  ,      Name: Sandip Abdi MRN: [id number]    Age: 89 y.o.     Sex: male   Language: English   Jewish: Presbyterian   Pneumonia due to organism     Date: 12/27/2024                           Spiritual Assessment began in [address] INTERNAL MEDICINE 2        Referral/Consult From: Multi-disciplinary team   Encounter Overview/Reason: Crisis  Service Provided For: Patient    Lorrie, Belief, Meaning:   Patient unable to assess at this time  Family/Friends No family/friends present      Importance and Influence:  Patient unable to assess at this time  Family/Friends No family/friends present    Community:  Patient Other: unable to assess Rapid Response Team working with patient  Family/Friends No family/friends present    Assessment and Plan of Care:     Patient Interventions include: Other: unable to assess RRT  Family/Friends Interventions include: No family/friends present    Patient Plan of Care: Spiritual Care available upon further referral  Family/Friends Plan of Care: No family/friends present    Electronically signed by Chaplain Kade on 12/27/2024 at 6:45 PM

## 2024-12-27 NOTE — PROGRESS NOTES
Speech Language Pathology      NAME:  Sandip Abdi  :  1931  DATE: 2024  ROOM:  0419/0419-A    Order received. Chart reviewed.    Per chart review, patient with very extensive hx of dysphagia. Most recent MBSS completed on 24 revealed moderate pharyngeal phase dysphagia. At that time diet recommendation was deferred to physician pending further discussion with patient and family, as patient was at a risk of aspiration with all consistencies administered. Per further chart review, patient admitted from [facility] where he was NPO with all nutrition via PEG.    To reduce risk of aspiration of secretions and its complications, recommend oral care at least 3 times a day and PRN. Per Reynold et al. 1998, individuals dependent on others for oral care, along with poor oral cavity health, have a significantly increased risk of pneumonia. An additional study completed by Melva et al., 2000, revealed that despite having reduced immune system status along with aspiration, a patient with good oral hygiene is considered to be at a low risk for pneumonia. However, a patient with the same reduced immune system status and occurrence of aspiration with poor oral hygiene, is considered to be at a high risk for pneumonia. Oral care is imperative to reduce the growth of bacteria in the oral cavity and thus saliva, which can be aspirated and further increase risk of aspiration pneumonia.      Due to known significant hx of dysphagia and longstanding NPO, a CSE is not appropriate. Will d/c order at this time. If oral diet is sought, an MBSS would be needed to determine safest LRD.    Pneumonia due to organism [J18.9]  Acute respiratory failure with hypoxia [J96.01]  Altered mental status, unspecified altered mental status type [R41.82]  Pneumonia of right lung due to infectious organism, unspecified part of lung [J18.9]    Thank you     Crystal Barragan M.S. CCC-SLP/L  Speech Language Pathologist  [id number]

## 2024-12-27 NOTE — ACP (ADVANCE CARE PLANNING)
Advance Care Planning   Healthcare Decision Maker:    Primary Decision Maker: Alyssa Gregg - Fani - [phone]    Click here to complete Healthcare Decision Makers including selection of the Healthcare Decision Maker Relationship (ie \"Primary\").

## 2024-12-27 NOTE — PLAN OF CARE
Problem: Discharge Planning  Goal: Discharge to home or other facility with appropriate resources  12/27/2024 1431 by Scarlett Chou, RN  Outcome: Not Progressing  12/27/2024 0041 by Sabiha Pang RN  Outcome: Progressing     Problem: Respiratory - Adult  Goal: Achieves optimal ventilation and oxygenation  Outcome: Not Progressing     Problem: Musculoskeletal - Adult  Goal: Return mobility to safest level of function  Outcome: Not Progressing     Problem: Genitourinary - Adult  Goal: Absence of urinary retention  Outcome: Not Progressing     Problem: Infection - Adult  Goal: Absence of infection at discharge  Outcome: Not Progressing

## 2024-12-27 NOTE — CARE COORDINATION
Social work:    Chart reviewed. Mr. Abdi is being treated for aspiration pneumonia and currently confused. Social service met with Mr. Abdi and spoke with daughter Alyssa via phone. Alyssa explained that the family is all sick and believe they passed the bug to Mr. Abdi who was experiencing diarrhea, nausea, vomiting. Mr. Abdi resides at the [facility] and is normally A. & O x3. He uses a stand-up walker and has a cane, & feeding tube that he cares for himself. [facility] provides the supplies.  Mr. Abdi has been in [facility] snf in July and was pleased with care. Alyssa prefers snf there once more. A referral was called to Roxanne at [facility].  Social work to follow-up.    Electronically signed by DARLIN Moe on 12/27/2024 at 3:45 PM

## 2024-12-27 NOTE — PROGRESS NOTES
4 Eyes Skin Assessment     NAME:  Sandip Abdi  YOB: 1931  MEDICAL RECORD NUMBER:  [id number]    The patient is being assessed for  Admission    I agree that at least one RN has performed a thorough Head to Toe Skin Assessment on the patient. ALL assessment sites listed below have been assessed.      Areas assessed by both nurses:    Head, Face, Ears, Shoulders, Back, Chest, Arms, Elbows, Hands, Sacrum. Buttock, Coccyx, Ischium, Legs. Feet and Heels, and Under Medical Devices         Does the Patient have a Wound? No noted wound(s)       Christopher Prevention initiated by RN: Yes  Wound Care Orders initiated by RN: No    Pressure Injury (Stage 3,4, Unstageable, DTI, NWPT, and Complex wounds) if present, place Wound referral order by RN under : No    New Ostomies, if present place, Ostomy referral order under : No     Nurse 1 eSignature: Electronically signed by Shandra Moody RN on 12/26/24 at 7:03 PM EST    **SHARE this note so that the co-signing nurse can place an eSignature**    Nurse 2 eSignature: Electronically signed by Samantha Peraza RN on 12/26/24 at 7:21 PM EST

## 2024-12-27 NOTE — PLAN OF CARE

## 2024-12-27 NOTE — PROGRESS NOTES
Occupational Therapy  OCCUPATIONAL THERAPY INITIAL EVALUATION  [facility]  [address], [place]    Date: 2024     Patient Name: Sandip Abdi  MRN: [id number]  : 1931  Room: [address] [place] [address]    Evaluating OT: Dede Skaggs, OTR/L - [id number]    Referring Provider: Lico Kowalski MD  Specific Provider Orders/Date: \"OT eval and treat\" - 2024    Diagnosis: Pneumonia due to organism [J18.9]  Acute respiratory failure with hypoxia [J96.01]  Altered mental status, unspecified altered mental status type [R41.82]  Pneumonia of right lung due to infectious organism, unspecified part of lung [J18.9]     Pertinent Medical History: HTN, Raynaud's disease     Precautions: fall risk, contact isolation (C-diff rule out), bed/chair alarms, skin integrity, [place], PEG tube    Assessment of Current Deficits:    [x] Functional mobility   [x] ADLs  [x] Strength               [x] Cognition   [x] Functional transfers   [x] IADLs         [x] Safety Awareness   [x] Endurance   [] Fine Motor Coordination  [x] Balance      [] Vision/Perception   [x] Sensation    [] Gross Motor Coordination [] ROM          [] Delirium                  [] Motor Control     OT PLAN OF CARE   OT POC is based on physician orders, patient diagnosis, and results of clinical assessment.  Frequency/Duration 2-5 days/week for 2-4 weeks PRN   Specific OT Treatment Interventions to Include:   * Instruction/training on adapted ADL techniques and AE recommendations to increase functional independence within precautions       * Training on energy conservation strategies, correct breathing pattern and techniques to improve independence/tolerance for self-care routine  * Functional transfer/mobility training/DME recommendations for increased independence, safety, and fall prevention  * Patient/Family education to increase follow through with safety techniques and functional independence  *  Recommendation of environmental modifications for increased safety with functional transfers/mobility and ADLs  * Cognitive retraining/development of therapeutic activities to improve problem solving, judgement, memory, and attention for increased safety/participation in ADL/IADL tasks  * Therapeutic exercise to improve motor endurance, ROM, and functional strength for ADLs/functional transfers  * Therapeutic activities to facilitate/challenge dynamic balance, stand tolerance for increased safety and independence with ADLs  * Neuro-muscular re-education: facilitation of righting/equilibrium reactions, midline orientation, scapular stability/mobility, normalization of muscle tone, and facilitation of volitional active controled movement  * Positioning to improve skin integrity, interaction with environment and functional independence    Recommended Adaptive Equipment: TBD     Home Living: Patient is a resident of an [facility], per review of patient's medical record.     Prior Level of Function (PLOF): Patient unable to report his most recent PLOF with regard to ADLs; no family/caregiver present. Patient uses a stand-up walker at the [facility] and was walking 10,000 steps per day, per review of patient's medical record.    Pain Level: No c/o pain.  Cognition: Patient alert and oriented to person; confusion demonstrated. Fair command follow demonstrated. Patient cooperative. Impaired insight to current abilities/limitations demonstrated. Slowed processing noted.    Functional Assessment:  AM-PAC Daily Activity Raw Score: 11/24   Initial Eval Status  Date: 12/27/2024 Treatment Status  Date:  Short Term Goals = Long Term Goals   Feeding N/A  NPO - PEG tube  N/A   Grooming Mod A  SBA   UB Dressing Mod A to doff/don hospital gown (twice during this session).  Setup   LB Dressing Max A to doff/don hospital socks.  SBA - with use of AE, as needed/appropriate   Bathing Max A for LB sponge bathing tasks following several episodes of bowel  of session, patient seated in bedside chair with call light and phone within reach, waffle cushion in place, staff members present, and all lines and tubes intact. Patient would benefit from continued skilled OT to increase safety and independence with completion of ADL/IADL tasks for functional independence and quality of life.     Treatment: OT treatment provided this date included:   Instruction/training on safety and adapted techniques for completion of ADLs.    Instruction/training on safe functional mobility/transfer techniques.    Patient education provided regardin) techniques to maximize safety with management/use of walker. Patient indicated limited understanding.    Further skilled OT treatment indicated to increase patient's safety and independence with completion of ADL/IADL tasks in order to maximize patient's functional independence and quality of life.    Rehab Potential: Good for established goals.  Patient / Family Goal: No goal stated/indicated.  Patient and/or family were instructed on functional diagnosis, prognosis/goals, and OT plan of care. Demonstrated limited understanding.    Eval Complexity: Low    Time In: 1530  Time Out: 1555  Total Treatment Time: 10 minutes      Minutes Units   OT Eval Low 86289 15 1   OT Eval Medium 43439     OT Eval High 47033     OT Re-Eval 67320     Therapeutic Ex 96405     Therapeutic Activities 58839     ADL/Self Care 83908 10 1   Orthotic Management 49634     Neuro Re-Ed 20045     Non-Billable Time N/A ---     Evaluation time includes thorough review of current medical information, gathering information on past medical history/social history and prior level of function, completion of standardized testing/informal observation of tasks, assessment of data, and education on plan of care and goals.    PATI Ruiz/L  License Number: [id number]

## 2024-12-27 NOTE — CONSULTS
Comprehensive Nutrition Assessment    Type and Reason for Visit:  Initial, Consult (TF Ordering and Management)    Nutrition Recommendations/Plan:   Continue current TF to meet estimated needs:    12/27 TF ORDER: Standard TF with Fiber (Jevity 1.5) bolus 237 ml (equivalent 1 carton) x 5 per day with 60 ml water flushes before and after each bolus. Start Bolus TF at least 30 min after Synthroid  This will provide: 1185 ml/d, 1778 samantha, 76 g pro, 1501 ml total free water  This regimen will meet 100% energy and protein needs    Continue inpatient monitoring     Malnutrition Assessment:  Malnutrition Status:  At risk for malnutrition (12/27/24 1025)    Context:  Chronic Illness     Findings of the 6 clinical characteristics of malnutrition:  Energy Intake:  Unable to assess  Weight Loss:  Mild weight loss (5% loss in last 5 mo)     Body Fat Loss:   (moderate) Orbital   Muscle Mass Loss:  Unable to assess (some expected age-related sarcopenia)    Fluid Accumulation:  Unable to assess     Strength:  Not Performed    Nutrition Assessment:    Pt admit from [facility] 2/2 PNA. Pt has hx. chronic dysphagia and has PEG for TF. Pt noted eating a small Roni cookie at AL PTA. Will adjust TF order to meet pt needs. It is reported that he walks around the facility to acheive 10,000 steps/d, and this will be considered. Pt's family report pt has been having diarrhea. Very large volume bolus with current  water flushes (400 ml TF+200 ml total water=600 ml volume x 4), which could exacerbate loose stools.    Nutrition Related Findings:    On Synthroid, soft abd w/hypo BS, PEG (bolus TF), I/O WNL, no edema, [place] Wound Type: None       Current Nutrition Intake & Therapies:    Average Meal Intake: NPO  Average Supplements Intake: NPO  Current Tube Feeding (TF) Orders:  Feeding Route: PEG  Formula: Standard with Fiber  Schedule: Bolus  Feeding Regimen: 400 ml x 4 daily=1600 ml/d  Additives/Modulars: Protein (1 dose daily)  Water Flushes:

## 2024-12-28 LAB
C DIFF GDH + TOXINS A+B STL QL IA.RAPID: NEGATIVE
L PNEUMO1 AG UR QL IA.RAPID: NEGATIVE
MICROORGANISM SPEC CULT: ABNORMAL
S PNEUM AG SPEC QL: NEGATIVE
SERVICE CMNT-IMP: ABNORMAL
SPECIMEN DESCRIPTION: ABNORMAL
SPECIMEN DESCRIPTION: NORMAL
SPECIMEN SOURCE: NORMAL

## 2024-12-28 NOTE — SIGNIFICANT EVENT
[facility] Hospitalist RRT Note    RRT called for tachycardia with heart rate in 200s  On arrival, patient was on nonrebreather at about 10L NC saturating in upper 80s.  Increased work of breathing.  Auscultation reviewed expiratory wheeze bilateral lungs as well as rhonchi.  O2 increased to 15L NC.  Patient given Solu-Medrol.  CXR showed patchy opacification right lower middle lobe without evidence of vascular congestion.  Initial BP elevated however patient noted to have persistent diarrhea with leak around FMS.  Patient given 5 cc bolus NS.  EKG showed A-fib RVR  ABG showed pH 7.27/39/61/17.6.  Appears mixed/venous  Patient also given Lopressor 5 mg x 2.  HR dropped from 170s to 114 ~115.  Plan is to start amnio if persistent A-fib.  Will consult cardiology to see in AM.  Patient heart rate at this point stabilized did not require amiodarone.  Instructed nurse to bolus amiodarone consult cardiology if persistent.  Further labs obtained  Family updated at bedside      NOTE: This report was transcribed using voice recognition software. Every effort was made to ensure accuracy; however, inadvertent computerized transcription errors may be present.  Electronically signed by Mike Wood MD on 12/27/2024 at 9:31 PM

## 2024-12-28 NOTE — PROGRESS NOTES
Nursing was called to patient's room, where patient's son, daughter and grandson's are in the room. They are requesting patient be changed to comfort measures only and are requesting that airvo be turned off and ativan and morphine be given.

## 2024-12-30 LAB
EKG ATRIAL RATE: 159 BPM
EKG Q-T INTERVAL: 312 MS
EKG QRS DURATION: 114 MS
EKG QTC CALCULATION (BAZETT): 516 MS
EKG R AXIS: -35 DEGREES
EKG T AXIS: 46 DEGREES
EKG VENTRICULAR RATE: 165 BPM

## 2024-12-31 NOTE — PROGRESS NOTES
Physician Progress Note      PATIENT:               SANAZ BUSH  CSN #:                  [id number]  :                       1931  ADMIT DATE:       2024 11:52 AM  DISCH DATE:        2024 3:04 AM  RESPONDING  PROVIDER #:        Michael DEXTER DO          QUERY TEXT:    Pt admitted with Sepsis and Aspiration Pneumonia. Pt noted to have concern for   altered mental status per ED provider on 24. If possible, please   document in the progress notes and discharge summary if you are evaluating and   / or treating any of the following:    The medical record reflects the following:  Risk Factors: Sepsis; Aspiration Pneumonia; Hypoxia;  Clinical Indicators:  24: Per ED provider: presents with concern for altered mental status.   Coming from a nursing facility, facility noted he is normally alert and   oriented, extremely active walks around facility daily.  Today he was feeling   more fatigued, did not want to get out of bed, was more somnolent than normal.    Family present at bedside notes he is definitely altered from his normal   mental status, they do not believe that he had fallen. Physical exam: alert   and [id number] from normal baseline  24: Per H&P: Awake and alert, oriented although some situational   confusion/forgetfulness is noted. sepsis in the setting of Aspiration   Pneumonia with acute hypoxia  24: CT Head: no acute intracranial abnormality  24: WBC: 3.2  24: Sodium, 147;. Chloride 114; C02 30; Lactic acid 5.4; Glucose 217;   Procalcitonin 3.11;  24: Nursing note at 6:11pm: patient alert to self  24 @ 22:18 nursing note: Oriented X 4  Treatment: IV fluid bolus; laboratory studies; imaging; IV fluids; Unasyn IV;   Oxygen;  Options provided:  -- Altered Mental status due to Metabolic encephalopathy related to Aspiration   pneumonia and sepsis  -- Altered Mental Status due to Encephalopathy related to Aspiration Pneumonia   and sepsis  --

## 2024-12-31 NOTE — DISCHARGE SUMMARY
The patient was discharged on the same day as history and physical.  Please see history and physical as well as imaging studies, consult and evaluations, and nurse's notes.    Electronically signed by Michael Whatley DO on 12/31/2024 at 9:09 AM

## 2025-01-07 NOTE — PROGRESS NOTES
Physician Progress Note      PATIENT:               SANAZ BUSH  CSN #:                  [id number]  :                       1931  ADMIT DATE:       2024 11:52 AM  DISCH DATE:        2024 3:04 AM  RESPONDING  PROVIDER #:        Michael DEXTER DO          QUERY TEXT:    Patient admitted with Sepsis and aspiration pneumonia. Noted to have Troponin   levels of 48 on 24 and 49 on 24.  EKG on 24: ST elevation,   consider lateral injury or acute infarct Acute MI/STEMI, please document in   the progress notes and discharge summary if you are evaluating and/or treating   any of the following:    The medical record reflects the following:  Risk Factors: Aspiration Pneumonia, Sepsis, Acute Respiratory failure with   hypoxia  Clinical Indicators:  24: Per ED provider AMS, feeling more fatigued, did not want to get out   of bed.  Had been hypoxic at the facility.  24: Significant event documentation: EKG shows A-fib with RVR  24: EKG: Sinus tachycardia  Left axis deviation  Left bundle branch   block Abnormal ECG When compared with ECG of 10-JUL-2024 17:54,  24: EKG: Atrial fibrillation with rapid ventricular response.  Left axis   deviation.  Incomplete left bundle branch block.  Moderate voltage criteria   for LVH, may be normal variant.  ST elevation, consider lateral injury or   acute infarct acute MI/STEMI  24: EKG: Sinus tachycardia  Left axis deviation  Left bundle branch   block Abnormal ECG When compared with ECG of 10-JUL-2024 17:54,  24: EKG: Atrial fibrillation with rapid ventricular response.  Left axis   deviation.  Incomplete left bundle branch block.  Moderate voltage criteria   for LVH, may be normal variant.  ST elevation, consider lateral injury or   acute infarct acute MI/STEMI  24: Troponin high sens ng/L: 48 repeat of 48  24: Troponin high sens ng/L: 49  Treatment: EKG, ASA PO; Troponin monitoring; Lovenox sq; Lopressor

## (undated) DEVICE — GRADUATE TRIANG MEASURE 1000ML BLK PRNT